# Patient Record
Sex: FEMALE | Race: WHITE | HISPANIC OR LATINO | Employment: OTHER | URBAN - METROPOLITAN AREA
[De-identification: names, ages, dates, MRNs, and addresses within clinical notes are randomized per-mention and may not be internally consistent; named-entity substitution may affect disease eponyms.]

---

## 2019-04-21 ENCOUNTER — COMMUNICATION - HEALTHEAST (OUTPATIENT)
Dept: SCHEDULING | Facility: CLINIC | Age: 59
End: 2019-04-21

## 2021-05-28 NOTE — TELEPHONE ENCOUNTER
Triage Nurse Advisor- [facility]    RN Phone Assessment  Pt calling requesting care advice for diabetes  Pt missed her dose of insulin last night, long acting insulin  Lantus, normal dose is 17 units at bedtime. Missed dose  Today at 5:30 pm took 6 units because she remembered she had not taken last night  Wants to know if she can take her normal dose tonight.  PCP is with [facility].     Reviewed Care Advice per Protocol  Encouraged pt to call her PCP. Pt  agrees with  the recommended plan of care. Has no further questions at this time. Encouraged to call back as needed for worsening or development of additional symptoms as reviewed per protocol.     Radha Hernandez, RN, [facility] Nurse Triage    Reason for Disposition    Diabetes drug error or overdose (e.g., insulin or extra dose)    Protocols used: MEDICATION QUESTION CALL-A-

## 2022-02-07 ENCOUNTER — APPOINTMENT (OUTPATIENT)
Dept: RADIOLOGY | Facility: HOSPITAL | Age: 62
End: 2022-02-07
Attending: EMERGENCY MEDICINE
Payer: COMMERCIAL

## 2022-02-07 ENCOUNTER — HOSPITAL ENCOUNTER (EMERGENCY)
Facility: HOSPITAL | Age: 62
Discharge: HOME | End: 2022-02-07
Attending: EMERGENCY MEDICINE | Admitting: EMERGENCY MEDICINE
Payer: COMMERCIAL

## 2022-02-07 VITALS
BODY MASS INDEX: 29.08 KG/M2 | HEIGHT: 62 IN | HEART RATE: 74 BPM | DIASTOLIC BLOOD PRESSURE: 78 MMHG | RESPIRATION RATE: 18 BRPM | SYSTOLIC BLOOD PRESSURE: 160 MMHG | OXYGEN SATURATION: 100 % | TEMPERATURE: 98 F | WEIGHT: 158 LBS

## 2022-02-07 DIAGNOSIS — N39.0 URINARY TRACT INFECTION WITHOUT HEMATURIA, SITE UNSPECIFIED: ICD-10-CM

## 2022-02-07 DIAGNOSIS — R79.89 ELEVATED SERUM CREATININE: ICD-10-CM

## 2022-02-07 DIAGNOSIS — U07.1 2019 NOVEL CORONAVIRUS DISEASE (COVID-19): ICD-10-CM

## 2022-02-07 DIAGNOSIS — N18.9 CHRONIC KIDNEY DISEASE, UNSPECIFIED CKD STAGE: ICD-10-CM

## 2022-02-07 PROBLEM — S52.502D CLOSED FRACTURE OF LOWER END OF LEFT RADIUS WITH ROUTINE HEALING: Status: ACTIVE | Noted: 2022-02-07

## 2022-02-07 PROBLEM — N18.32 STAGE 3B CHRONIC KIDNEY DISEASE (H): Status: ACTIVE | Noted: 2021-11-08

## 2022-02-07 PROBLEM — H25.13 AGE-RELATED NUCLEAR CATARACT OF BOTH EYES: Status: ACTIVE | Noted: 2020-01-07

## 2022-02-07 PROBLEM — E78.1 HIGH TRIGLYCERIDES: Status: ACTIVE | Noted: 2019-10-08

## 2022-02-07 PROBLEM — B96.20 E. COLI UTI: Status: ACTIVE | Noted: 2021-12-06

## 2022-02-07 PROBLEM — D64.9 ANEMIA: Status: ACTIVE | Noted: 2020-06-15

## 2022-02-07 PROBLEM — R80.9 MICROALBUMINURIA: Status: ACTIVE | Noted: 2019-10-08

## 2022-02-07 PROBLEM — R07.9 CHEST PAIN: Status: ACTIVE | Noted: 2020-06-08

## 2022-02-07 PROBLEM — I10 HTN (HYPERTENSION): Status: ACTIVE | Noted: 2021-01-29

## 2022-02-07 LAB
ALBUMIN SERPL-MCNC: 3.5 G/DL (ref 3.5–5)
ALBUMIN UR-MCNC: 100 MG/DL
ALP SERPL-CCNC: 61 U/L (ref 45–120)
ALT SERPL W P-5'-P-CCNC: 20 U/L (ref 0–45)
ANION GAP SERPL CALCULATED.3IONS-SCNC: 11 MMOL/L (ref 5–18)
ANION GAP SERPL CALCULATED.3IONS-SCNC: 7 MMOL/L (ref 5–18)
APPEARANCE UR: ABNORMAL
AST SERPL W P-5'-P-CCNC: 20 U/L (ref 0–40)
BACTERIA #/AREA URNS HPF: ABNORMAL /HPF
BASOPHILS # BLD AUTO: 0 10E3/UL (ref 0–0.2)
BASOPHILS NFR BLD AUTO: 1 %
BILIRUB DIRECT SERPL-MCNC: 0.1 MG/DL
BILIRUB SERPL-MCNC: 0.2 MG/DL (ref 0–1)
BILIRUB UR QL STRIP: NEGATIVE
BUN SERPL-MCNC: 20 MG/DL (ref 8–22)
BUN SERPL-MCNC: 24 MG/DL (ref 8–22)
C REACTIVE PROTEIN LHE: 0.5 MG/DL (ref 0–0.8)
CALCIUM SERPL-MCNC: 8 MG/DL (ref 8.5–10.5)
CALCIUM SERPL-MCNC: 8.4 MG/DL (ref 8.5–10.5)
CHLORIDE BLD-SCNC: 101 MMOL/L (ref 98–107)
CHLORIDE BLD-SCNC: 96 MMOL/L (ref 98–107)
CO2 SERPL-SCNC: 23 MMOL/L (ref 22–31)
CO2 SERPL-SCNC: 25 MMOL/L (ref 22–31)
COLOR UR AUTO: ABNORMAL
CREAT SERPL-MCNC: 1.95 MG/DL (ref 0.6–1.1)
CREAT SERPL-MCNC: 2.23 MG/DL (ref 0.6–1.1)
EOSINOPHIL # BLD AUTO: 0.1 10E3/UL (ref 0–0.7)
EOSINOPHIL NFR BLD AUTO: 3 %
ERYTHROCYTE [DISTWIDTH] IN BLOOD BY AUTOMATED COUNT: 12.8 % (ref 10–15)
FLUAV RNA SPEC QL NAA+PROBE: NEGATIVE
FLUBV RNA RESP QL NAA+PROBE: NEGATIVE
GFR SERPL CREATININE-BSD FRML MDRD: 24 ML/MIN/1.73M2
GFR SERPL CREATININE-BSD FRML MDRD: 29 ML/MIN/1.73M2
GLUCOSE BLD-MCNC: 159 MG/DL (ref 70–125)
GLUCOSE BLD-MCNC: 181 MG/DL (ref 70–125)
GLUCOSE UR STRIP-MCNC: NEGATIVE MG/DL
HCT VFR BLD AUTO: 37.6 % (ref 35–47)
HGB BLD-MCNC: 12.8 G/DL (ref 11.7–15.7)
HGB UR QL STRIP: ABNORMAL
HOLD SPECIMEN: NORMAL
IMM GRANULOCYTES # BLD: 0 10E3/UL
IMM GRANULOCYTES NFR BLD: 0 %
KETONES UR STRIP-MCNC: NEGATIVE MG/DL
LEUKOCYTE ESTERASE UR QL STRIP: ABNORMAL
LIPASE SERPL-CCNC: 29 U/L (ref 0–52)
LYMPHOCYTES # BLD AUTO: 1.4 10E3/UL (ref 0.8–5.3)
LYMPHOCYTES NFR BLD AUTO: 39 %
MAGNESIUM SERPL-MCNC: 1.7 MG/DL (ref 1.8–2.6)
MCH RBC QN AUTO: 31.4 PG (ref 26.5–33)
MCHC RBC AUTO-ENTMCNC: 34 G/DL (ref 31.5–36.5)
MCV RBC AUTO: 92 FL (ref 78–100)
MONOCYTES # BLD AUTO: 0.3 10E3/UL (ref 0–1.3)
MONOCYTES NFR BLD AUTO: 8 %
NEUTROPHILS # BLD AUTO: 1.8 10E3/UL (ref 1.6–8.3)
NEUTROPHILS NFR BLD AUTO: 49 %
NITRATE UR QL: NEGATIVE
NRBC # BLD AUTO: 0 10E3/UL
NRBC BLD AUTO-RTO: 0 /100
PH UR STRIP: 6 [PH] (ref 5–7)
PLATELET # BLD AUTO: 153 10E3/UL (ref 150–450)
POTASSIUM BLD-SCNC: 3.3 MMOL/L (ref 3.5–5)
POTASSIUM BLD-SCNC: 3.6 MMOL/L (ref 3.5–5)
PROCALCITONIN SERPL-MCNC: 0.06 NG/ML (ref 0–0.49)
PROT SERPL-MCNC: 6.6 G/DL (ref 6–8)
RBC # BLD AUTO: 4.07 10E6/UL (ref 3.8–5.2)
RBC URINE: 5 /HPF
SARS-COV-2 RNA RESP QL NAA+PROBE: POSITIVE
SODIUM SERPL-SCNC: 130 MMOL/L (ref 136–145)
SODIUM SERPL-SCNC: 133 MMOL/L (ref 136–145)
SP GR UR STRIP: 1.01 (ref 1–1.03)
SQUAMOUS EPITHELIAL: 3 /HPF
TSH SERPL DL<=0.005 MIU/L-ACNC: 1.27 UIU/ML (ref 0.3–5)
UROBILINOGEN UR STRIP-MCNC: <2 MG/DL
WBC # BLD AUTO: 3.6 10E3/UL (ref 4–11)
WBC CLUMPS #/AREA URNS HPF: PRESENT /HPF
WBC URINE: >182 /HPF

## 2022-02-07 PROCEDURE — 87636 SARSCOV2 & INF A&B AMP PRB: CPT | Performed by: EMERGENCY MEDICINE

## 2022-02-07 PROCEDURE — 87086 URINE CULTURE/COLONY COUNT: CPT | Performed by: EMERGENCY MEDICINE

## 2022-02-07 PROCEDURE — 83690 ASSAY OF LIPASE: CPT | Performed by: EMERGENCY MEDICINE

## 2022-02-07 PROCEDURE — 82310 ASSAY OF CALCIUM: CPT | Performed by: EMERGENCY MEDICINE

## 2022-02-07 PROCEDURE — 86140 C-REACTIVE PROTEIN: CPT | Performed by: EMERGENCY MEDICINE

## 2022-02-07 PROCEDURE — 83735 ASSAY OF MAGNESIUM: CPT | Performed by: EMERGENCY MEDICINE

## 2022-02-07 PROCEDURE — 250N000011 HC RX IP 250 OP 636: Performed by: EMERGENCY MEDICINE

## 2022-02-07 PROCEDURE — 93005 ELECTROCARDIOGRAM TRACING: CPT | Performed by: EMERGENCY MEDICINE

## 2022-02-07 PROCEDURE — C9803 HOPD COVID-19 SPEC COLLECT: HCPCS

## 2022-02-07 PROCEDURE — 82248 BILIRUBIN DIRECT: CPT | Performed by: EMERGENCY MEDICINE

## 2022-02-07 PROCEDURE — 96365 THER/PROPH/DIAG IV INF INIT: CPT

## 2022-02-07 PROCEDURE — 99285 EMERGENCY DEPT VISIT HI MDM: CPT | Mod: 25

## 2022-02-07 PROCEDURE — 81001 URINALYSIS AUTO W/SCOPE: CPT | Performed by: EMERGENCY MEDICINE

## 2022-02-07 PROCEDURE — 36415 COLL VENOUS BLD VENIPUNCTURE: CPT | Performed by: STUDENT IN AN ORGANIZED HEALTH CARE EDUCATION/TRAINING PROGRAM

## 2022-02-07 PROCEDURE — 84443 ASSAY THYROID STIM HORMONE: CPT | Performed by: EMERGENCY MEDICINE

## 2022-02-07 PROCEDURE — 258N000003 HC RX IP 258 OP 636: Performed by: EMERGENCY MEDICINE

## 2022-02-07 PROCEDURE — 85025 COMPLETE CBC W/AUTO DIFF WBC: CPT | Performed by: EMERGENCY MEDICINE

## 2022-02-07 PROCEDURE — 36415 COLL VENOUS BLD VENIPUNCTURE: CPT | Performed by: EMERGENCY MEDICINE

## 2022-02-07 PROCEDURE — 71045 X-RAY EXAM CHEST 1 VIEW: CPT

## 2022-02-07 PROCEDURE — 84145 PROCALCITONIN (PCT): CPT | Performed by: EMERGENCY MEDICINE

## 2022-02-07 PROCEDURE — 96361 HYDRATE IV INFUSION ADD-ON: CPT

## 2022-02-07 RX ORDER — ONDANSETRON 4 MG/1
4 TABLET, ORALLY DISINTEGRATING ORAL EVERY 8 HOURS PRN
Qty: 20 TABLET | Refills: 0 | Status: SHIPPED | OUTPATIENT
Start: 2022-02-07 | End: 2023-04-14

## 2022-02-07 RX ORDER — CEFDINIR 300 MG/1
300 CAPSULE ORAL DAILY
Qty: 10 CAPSULE | Refills: 0 | Status: SHIPPED | OUTPATIENT
Start: 2022-02-07 | End: 2022-02-17

## 2022-02-07 RX ORDER — CEFTRIAXONE 2 G/1
2 INJECTION, POWDER, FOR SOLUTION INTRAMUSCULAR; INTRAVENOUS ONCE
Status: COMPLETED | OUTPATIENT
Start: 2022-02-07 | End: 2022-02-07

## 2022-02-07 RX ORDER — ONDANSETRON 2 MG/ML
4 INJECTION INTRAMUSCULAR; INTRAVENOUS ONCE
Status: DISCONTINUED | OUTPATIENT
Start: 2022-02-07 | End: 2022-02-07 | Stop reason: HOSPADM

## 2022-02-07 RX ADMIN — SODIUM CHLORIDE 500 ML: 9 INJECTION, SOLUTION INTRAVENOUS at 10:42

## 2022-02-07 RX ADMIN — CEFTRIAXONE SODIUM 2 G: 2 INJECTION, POWDER, FOR SOLUTION INTRAMUSCULAR; INTRAVENOUS at 10:42

## 2022-02-07 RX ADMIN — SODIUM CHLORIDE, POTASSIUM CHLORIDE, SODIUM LACTATE AND CALCIUM CHLORIDE 500 ML: 600; 310; 30; 20 INJECTION, SOLUTION INTRAVENOUS at 08:52

## 2022-02-07 ASSESSMENT — ENCOUNTER SYMPTOMS
RHINORRHEA: 0
CHILLS: 0
DIARRHEA: 0
NAUSEA: 1
FATIGUE: 1
FEVER: 0
COUGH: 0
DYSURIA: 1
SHORTNESS OF BREATH: 1
DIAPHORESIS: 0

## 2022-02-07 ASSESSMENT — MIFFLIN-ST. JEOR: SCORE: 1234.93

## 2022-02-07 NOTE — ED PROVIDER NOTES
EMERGENCY DEPARTMENT ENCOUNTER      NAME: Ana Bunn  AGE: 61 year old female  YOB: 1960  MRN: [id number]  EVALUATION DATE & TIME: 2/7/2022  8:23 AM    PCP: No primary care provider on file.    ED PROVIDER: Leonardo Parker M.D.      Chief Complaint   Patient presents with     Shortness of Breath         IMPRESSION  1. 2019 novel coronavirus disease (COVID-19)    2. Urinary tract infection without hematuria, site unspecified    3. Elevated serum creatinine    4. Chronic kidney disease, unspecified CKD stage        PLAN  - cefdinir 300mg q24h x10 days for UTI  - home isolation for COVID-19 with [facility] referral made  - close PCP follow up  - discharge to home    ED COURSE & MEDICAL DECISION MAKING    ED Course as of 02/07/22 1307   Mon Feb 07, 2022   0824 61yoF with history of HTN, T2DM (takes insulin), s/p COVID-19 vaccine (most recent 5/2021) presenting for evaluation of fatigue & shortness of breath. Reports 3 days ago she developed new fatigue with mild nausea; then 2 nights ago had one episode of dysuria which reminded her of prior UTI. Had previously-prescribed Bactrim so started it; dysuria resolved. Fatigue & nausea ongoing though (no vomiting) and this morning had shortness of breath when trying to walk around as well. No chest pain/pressure. No cough, runny nose, nasal congestion, body aches, fevers, sweats, chills, diarrhea, pains of any sort. Took no meds other than Bactrim prior to ED arrival.    Normal vitals on presentation. Calm on exam with clear lungs, normal work of breathing, no stridor, normal phonation, benign abdomen, no peripheral edema or calf tenderness, moist mucous membranes, nonfocal neuro exam. Overall nonspecific broad symptoms; will obtain COVID swab, blood, urine, CXR while giving 500mL IVF & Zofran for symptoms. Triage EKG with no ST changes; doubt ACS or primary cardiac etiology. Patient comfortable with this plan; no further questions at this time.   0941  CXR with no edema, infiltrate, pneumothorax, acute abnormality, explanatory pathology. Labs to this point notable for VIKTOR with creatinine 2.2 (baseline 1.6 per [facility]) with K 3.3 and mag 1.7; will not aggressively treat mildly low K & Mg given active VIKTOR and no EKG changes of hypoK or hypoMg.   0948 Lab called with positive COVID-19 swab result.   1020 UA with clear UTI as well with bacteria, WBC clumps, leuk esterase; Rocephin thus ordered.   1113 Procal within normal limits; reassuring against bacteremia.   1144 Patient ambulated without difficulty with O2 sats % on room air during this. Repeat BMP after IVF pending at this time.   1235 Recheck BMP with creatinine 1.9 with K 3.9; improved from initial. Overall has UTI with mild increased creatinine on top of prior CKD as well as COVID. No concern for sepsis from UTI standpoint. Satting well on room air from COVID standpoint and ambulates without difficulty. Ok for outpatient management on both fronts. Home cares and isolation discussed and understood at bedside. Patient able to tolerate PO and walk without difficulty. Patient comfortable with discharge at this time. Return precautions and need for PCP follow up discussed and understood. No further questions at the time of discharge.       8:30 AM I met with the patient for the initial interview and physical examination. Discussed plan for treatment and workup in the ED.  9:48 AM Patient COVID positive.  12:27 PM rechecked the patient. Patient is feeling comfortable, ready to go home. We discussed discharge plan, no further questions at this time.      This patient involved a high degree of complexity in medical decision making, as significant risks were present and assessed.    I wore the following PPE during this patient encounter:  N95 mask, face shield w/ eye protection, gloves, gown    MEDICATIONS GIVEN IN THE EMERGENCY DEPARTMENT  Medications   ondansetron (ZOFRAN) injection 4 mg (0 mg  Intravenous Hold 2/7/22 0919)   lactated ringers BOLUS 500 mL (0 mLs Intravenous Stopped 2/7/22 1036)   cefTRIAXone (ROCEPHIN) 2 g vial to attach to  ml bag for ADULTS or NS 50 ml bag for PEDS (0 g Intravenous Stopped 2/7/22 1117)   0.9% sodium chloride BOLUS (0 mLs Intravenous Stopped 2/7/22 1128)       NEW PRESCRIPTIONS STARTED AT TODAY'S ER VISIT  Current Discharge Medication List      START taking these medications    Details   cefdinir (OMNICEF) 300 MG capsule Take 1 capsule (300 mg) by mouth daily for 10 days  Qty: 10 capsule, Refills: 0      ondansetron (ZOFRAN ODT) 4 MG ODT tab Take 1 tablet (4 mg) by mouth every 8 hours as needed for nausea  Qty: 20 tablet, Refills: 0                 =================================================================      HPI  Patient information was obtained from: patient    Use of : N/A      Ana Bunn is a 61 year old female with a pertinent history of hypertension, CKD stage 3, diabetes mellitus type 2, UTI, s/p COVID-19 vaccine (most recent 5/2021), chest pain, and anemia who presents to this ED via private car for evaluation of shortness of breath.    Patient presenting for evaluation of fatigue & shortness of breath. Reports 3 days ago (2/4) she developed new fatigue with mild nausea; then 2 nights ago had one episode of dysuria which reminded her of prior UTI. Had previously-prescribed Bactrim so started it; dysuria resolved. Fatigue & nausea ongoing though (no vomiting) and this morning had shortness of breath when trying to walk around as well. No chest pain/pressure. No cough, runny nose, nasal congestion, body aches, fevers, sweats, chills, diarrhea, pains of any sort. Took no meds other than Bactrim prior to ED arrival. Denies any other complaints at this time.    REVIEW OF SYSTEMS   Review of Systems   Constitutional: Positive for fatigue. Negative for chills, diaphoresis and fever.        Negative for body aches   HENT: Negative for  congestion and rhinorrhea.    Respiratory: Positive for shortness of breath. Negative for cough.    Cardiovascular: Negative for chest pain.   Gastrointestinal: Positive for nausea. Negative for diarrhea.   Genitourinary: Positive for dysuria (resolved).   All other systems reviewed and are negative.    All other systems reviewed and are negative except as noted above in HPI.        --------------- MEDICAL HISTORY ---------------  PAST MEDICAL HISTORY:  History reviewed. No pertinent past medical history.  Patient Active Problem List   Diagnosis     Age-related nuclear cataract of both eyes     Chest pain     Anemia     Closed fracture of lower end of left radius with routine healing     E. coli UTI     High triglycerides     HTN (hypertension)     Microalbuminuria     Stage 3b chronic kidney disease (H)     Type 2 diabetes mellitus with microalbuminuria, with long-term current use of insulin (H)     Vitamin D insufficiency       PAST SURGICAL HISTORY:  History reviewed. No pertinent surgical history.    CURRENT MEDICATIONS:      Current Facility-Administered Medications:      ondansetron (ZOFRAN) injection 4 mg, 4 mg, Intravenous, Once, Leonardo Parker MD    Current Outpatient Medications:      cefdinir (OMNICEF) 300 MG capsule, Take 1 capsule (300 mg) by mouth daily for 10 days, Disp: 10 capsule, Rfl: 0     ondansetron (ZOFRAN ODT) 4 MG ODT tab, Take 1 tablet (4 mg) by mouth every 8 hours as needed for nausea, Disp: 20 tablet, Rfl: 0    ALLERGIES:  No Known Allergies    FAMILY HISTORY:  Reviewed. No pertinent past family history.    SOCIAL HISTORY:   Social History     Socioeconomic History     Marital status:      Spouse name: Not on file     Number of children: Not on file     Years of education: Not on file     Highest education level: Not on file   Occupational History     Not on file   Tobacco Use     Smoking status: Not on file     Smokeless tobacco: Not on file   Substance and Sexual Activity      Alcohol use: Not on file     Drug use: Not on file     Sexual activity: Not on file   Other Topics Concern     Not on file   Social History Narrative     Not on file     Social Determinants of Health     Financial Resource Strain: Not on file   Food Insecurity: Not on file   Transportation Needs: Not on file   Physical Activity: Not on file   Stress: Not on file   Social Connections: Not on file   Intimate Partner Violence: Not on file   Housing Stability: Not on file         --------------- PHYSICAL EXAM ---------------  Nursing notes and vitals reviewed by me.  VITALS:  Vitals:    02/07/22 0900 02/07/22 1136 02/07/22 1229 02/07/22 1230   BP: (!) 160/78      Pulse: 79 89 77 74   Resp: 13 28 19 18   Temp:       SpO2: 97% 99% 100% 100%   Weight:       Height:           PHYSICAL EXAM:    General:  alert, interactive, no distress  Eyes:  conjunctivae clear, conjugate gaze   HENT:  atraumatic, nose with no rhinorrhea, oropharynx clear, moist mucous membranes  Neck:  no meningismus, no stridor, normal phonation  Cardiovascular:  HR 80s during exam, regular rhythm, no murmurs, brisk cap refill  Chest:  no chest wall tenderness  Pulmonary:  normal work of breathing, clear lungs bilaterally  Abdomen:  soft, nondistended, nontender  :  no CVA tenderness  Back:  no midline spinal tenderness  Musculoskeletal:  no pretibial edema, no calf tenderness. Gross ROM intact to joints of extremities with no obvious deformities.  Skin:  warm, dry, no rash  Neuro:  awake, alert, answers questions appropriately, follows commands, moves all limbs, CN 2-12 intact, negative pronator drift, 5/5 strength to all extremities, no tremor  Psych:  calm, normal affect      --------------- RESULTS ---------------  EKG:    Reviewed and interpreted by me.  NSR at 90bpm, no ST changes, small symmetric TWI in V1-2, , QRS 94 with incomplete RBBB pattern, QTc 469  No priors  My read.    LAB:  Reviewed and interpreted by me.  Results for orders  placed or performed during the hospital encounter of 02/07/22   XR Chest Port 1 View    Impression    IMPRESSION: Bilateral cervical ribs. Slight thoracolumbar scoliosis. Heart size appears normal. Lungs appear clear.   Extra Blue Top Tube   Result Value Ref Range    Hold Specimen JIC    Extra Red Top Tube   Result Value Ref Range    Hold Specimen JIC    Extra Green Top (Lithium Heparin) Tube   Result Value Ref Range    Hold Specimen JIC    Extra Purple Top Tube   Result Value Ref Range    Hold Specimen JIC    Symptomatic; Unknown Influenza A/B & SARS-CoV2 (COVID-19) Virus PCR Multiplex Nasopharyngeal    Specimen: Nasopharyngeal; Swab   Result Value Ref Range    Influenza A PCR Negative Negative    Influenza B PCR Negative Negative    SARS CoV2 PCR Positive (A) Negative   UA with Microscopic reflex to Culture    Specimen: Urine, Clean Catch   Result Value Ref Range    Color Urine Light Yellow Colorless, Straw, Light Yellow, Yellow    Appearance Urine Turbid (A) Clear    Glucose Urine Negative Negative mg/dL    Bilirubin Urine Negative Negative    Ketones Urine Negative Negative mg/dL    Specific Gravity Urine 1.009 1.001 - 1.030    Blood Urine 0.03 mg/dL (A) Negative    pH Urine 6.0 5.0 - 7.0    Protein Albumin Urine 100  (A) Negative mg/dL    Urobilinogen Urine <2.0 <2.0 mg/dL    Nitrite Urine Negative Negative    Leukocyte Esterase Urine 500 Ania/uL (A) Negative    Bacteria Urine Moderate (A) None Seen /HPF    WBC Clumps Urine Present (A) None Seen /HPF    RBC Urine 5 (H) <=2 /HPF    WBC Urine >182 (H) <=5 /HPF    Squamous Epithelials Urine 3 (H) <=1 /HPF   CRP inflammation   Result Value Ref Range    CRP 0.5 0.0-<0.8 mg/dL   Result Value Ref Range    Procalcitonin 0.06 0.00 - 0.49 ng/mL   Result Value Ref Range    Magnesium 1.7 (L) 1.8 - 2.6 mg/dL   TSH with free T4 reflex   Result Value Ref Range    TSH 1.27 0.30 - 5.00 uIU/mL   Basic metabolic panel   Result Value Ref Range    Sodium 130 (L) 136 - 145 mmol/L     Potassium 3.3 (L) 3.5 - 5.0 mmol/L    Chloride 96 (L) 98 - 107 mmol/L    Carbon Dioxide (CO2) 23 22 - 31 mmol/L    Anion Gap 11 5 - 18 mmol/L    Urea Nitrogen 24 (H) 8 - 22 mg/dL    Creatinine 2.23 (H) 0.60 - 1.10 mg/dL    Calcium 8.4 (L) 8.5 - 10.5 mg/dL    Glucose 181 (H) 70 - 125 mg/dL    GFR Estimate 24 (L) >60 mL/min/1.73m2   Hepatic function panel   Result Value Ref Range    Bilirubin Total 0.2 0.0 - 1.0 mg/dL    Bilirubin Direct 0.1 <=0.5 mg/dL    Protein Total 6.6 6.0 - 8.0 g/dL    Albumin 3.5 3.5 - 5.0 g/dL    Alkaline Phosphatase 61 45 - 120 U/L    AST 20 0 - 40 U/L    ALT 20 0 - 45 U/L   Result Value Ref Range    Lipase 29 0 - 52 U/L   CBC with platelets and differential   Result Value Ref Range    WBC Count 3.6 (L) 4.0 - 11.0 10e3/uL    RBC Count 4.07 3.80 - 5.20 10e6/uL    Hemoglobin 12.8 11.7 - 15.7 g/dL    Hematocrit 37.6 35.0 - 47.0 %    MCV 92 78 - 100 fL    MCH 31.4 26.5 - 33.0 pg    MCHC 34.0 31.5 - 36.5 g/dL    RDW 12.8 10.0 - 15.0 %    Platelet Count 153 150 - 450 10e3/uL    % Neutrophils 49 %    % Lymphocytes 39 %    % Monocytes 8 %    % Eosinophils 3 %    % Basophils 1 %    % Immature Granulocytes 0 %    NRBCs per 100 WBC 0 <1 /100    Absolute Neutrophils 1.8 1.6 - 8.3 10e3/uL    Absolute Lymphocytes 1.4 0.8 - 5.3 10e3/uL    Absolute Monocytes 0.3 0.0 - 1.3 10e3/uL    Absolute Eosinophils 0.1 0.0 - 0.7 10e3/uL    Absolute Basophils 0.0 0.0 - 0.2 10e3/uL    Absolute Immature Granulocytes 0.0 <=0.4 10e3/uL    Absolute NRBCs 0.0 10e3/uL   Basic metabolic panel   Result Value Ref Range    Sodium 133 (L) 136 - 145 mmol/L    Potassium 3.6 3.5 - 5.0 mmol/L    Chloride 101 98 - 107 mmol/L    Carbon Dioxide (CO2) 25 22 - 31 mmol/L    Anion Gap 7 5 - 18 mmol/L    Urea Nitrogen 20 8 - 22 mg/dL    Creatinine 1.95 (H) 0.60 - 1.10 mg/dL    Calcium 8.0 (L) 8.5 - 10.5 mg/dL    Glucose 159 (H) 70 - 125 mg/dL    GFR Estimate 29 (L) >60 mL/min/1.73m2       RADIOLOGY:  Reviewed by me. Please see official  radiology report.  Recent Results (from the past 24 hour(s))   XR Chest Port 1 View    Narrative    EXAM: XR CHEST PORT 1 VIEW  LOCATION: [facility]  DATE/TIME: 2/7/2022 8:41 AM    INDICATION: SOB  COMPARISON: 12/06/2021      Impression    IMPRESSION: Bilateral cervical ribs. Slight thoracolumbar scoliosis. Heart size appears normal. Lungs appear clear.       PROCEDURES:   Procedures   --------------------------------------------------------------------------------   Cardiac telemetry monitoring ordered, reviewed, and interpreted by me while patient was in the Emergency Department. Revealed no acute abnormalities.  --------------------------------------------------------------------------------             I, Janelle Bazzi, am serving as a scribe to document services personally performed by Dr. Leonardo Parker based on my observation and the provider's statements to me. I, Leonardo Parker MD attest that Janelle Bazzi is acting in a scribe capacity, has observed my performance of the services and has documented them in accordance with my direction.      Leonardo Parker MD  02/07/22  Emergency Medicine  [facility] EMERGENCY DEPARTMENT  [address] [place] [id number] [phone]  Dept: [phone]       Leonardo Parker MD  02/07/22 0808

## 2022-02-07 NOTE — ED TRIAGE NOTES
Patient recently started on antibiotic for UTI (Bactrim) yesterday. Also developed SOB, yesterday, has been vaccinated (not booster yet) for covid. No pain. No known CAD.smoker. no cough

## 2022-02-07 NOTE — DISCHARGE INSTRUCTIONS
Isolate at home given your positive COVID-19 test and ongoing symptoms.    Take the antibiotic (cefdinir) as prescribed for your urine infection as well.    Use the prescribed Zofran as needed for any nausea.    For pain or fever at home, take:  - over-the-counter ibuprofen 400mg by mouth every 8 hours (max dose 2400mg in 24 hours)  - over-the-counter acetaminophen 1g by mouth every 6 hours (max dose 4g in 24 hours)    To decrease cough, you can try:  - honey (either spoonful or mixed in tea)  - over-the-counter Robitussin-DM cough syrup    For nasal congestion, take the following over-the-counter medications:  - cetirizine (Zyrtec) 10mg by mouth one time daily as needed  - pseudoephedrine (Sudafed) 60mg by mouth every 4-6 hours as needed    Continue all of your previously-prescribed medications as well.    Drink plenty of fluids to stay hydrated.    To see if you qualify for monoclonal antibody treatment for COVID-19 (allocated through the [place] Department of Health), go to the website:  [email or web address]    Follow up with your Primary Care provider in 2 days for a recheck.    Return to the Emergency Department for any difficulty breathing, persistent vomiting, or any other concerns.

## 2022-02-08 LAB
ATRIAL RATE - MUSE: 90 BPM
BACTERIA UR CULT: NORMAL
DIASTOLIC BLOOD PRESSURE - MUSE: NORMAL MMHG
INTERPRETATION ECG - MUSE: NORMAL
P AXIS - MUSE: 59 DEGREES
PR INTERVAL - MUSE: 150 MS
QRS DURATION - MUSE: 94 MS
QT - MUSE: 384 MS
QTC - MUSE: 469 MS
R AXIS - MUSE: -38 DEGREES
SYSTOLIC BLOOD PRESSURE - MUSE: NORMAL MMHG
T AXIS - MUSE: 56 DEGREES
VENTRICULAR RATE- MUSE: 90 BPM

## 2022-04-19 ENCOUNTER — APPOINTMENT (OUTPATIENT)
Dept: RADIOLOGY | Facility: HOSPITAL | Age: 62
End: 2022-04-19
Attending: EMERGENCY MEDICINE
Payer: COMMERCIAL

## 2022-04-19 ENCOUNTER — TELEPHONE (OUTPATIENT)
Dept: NEUROSURGERY | Facility: CLINIC | Age: 62
End: 2022-04-19

## 2022-04-19 ENCOUNTER — APPOINTMENT (OUTPATIENT)
Dept: CT IMAGING | Facility: HOSPITAL | Age: 62
End: 2022-04-19
Attending: EMERGENCY MEDICINE
Payer: COMMERCIAL

## 2022-04-19 ENCOUNTER — HOSPITAL ENCOUNTER (EMERGENCY)
Facility: HOSPITAL | Age: 62
Discharge: HOME | End: 2022-04-19
Attending: EMERGENCY MEDICINE | Admitting: EMERGENCY MEDICINE
Payer: COMMERCIAL

## 2022-04-19 VITALS
WEIGHT: 160 LBS | OXYGEN SATURATION: 97 % | HEIGHT: 62 IN | BODY MASS INDEX: 29.44 KG/M2 | HEART RATE: 89 BPM | RESPIRATION RATE: 16 BRPM | TEMPERATURE: 98.1 F | DIASTOLIC BLOOD PRESSURE: 97 MMHG | SYSTOLIC BLOOD PRESSURE: 170 MMHG

## 2022-04-19 DIAGNOSIS — S00.83XA CONTUSION OF FACE, SCALP AND NECK, INITIAL ENCOUNTER: ICD-10-CM

## 2022-04-19 DIAGNOSIS — S00.03XA CONTUSION OF FACE, SCALP AND NECK, INITIAL ENCOUNTER: ICD-10-CM

## 2022-04-19 DIAGNOSIS — S06.5XAA SUBDURAL HEMATOMA (H): ICD-10-CM

## 2022-04-19 DIAGNOSIS — Z86.39 HISTORY OF DIABETES MELLITUS: ICD-10-CM

## 2022-04-19 DIAGNOSIS — M47.812 CERVICAL SPONDYLOSIS: ICD-10-CM

## 2022-04-19 DIAGNOSIS — S06.5XAA SDH (SUBDURAL HEMATOMA) (H): Primary | ICD-10-CM

## 2022-04-19 DIAGNOSIS — S52.122A CLOSED DISPLACED FRACTURE OF HEAD OF LEFT RADIUS, INITIAL ENCOUNTER: ICD-10-CM

## 2022-04-19 DIAGNOSIS — I10 HYPERTENSION, UNSPECIFIED TYPE: ICD-10-CM

## 2022-04-19 DIAGNOSIS — S10.93XA CONTUSION OF FACE, SCALP AND NECK, INITIAL ENCOUNTER: ICD-10-CM

## 2022-04-19 DIAGNOSIS — M54.16 LUMBAR RADICULOPATHY: ICD-10-CM
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
## 2022-04-19 LAB
ANION GAP SERPL CALCULATED.3IONS-SCNC: 11 MMOL/L (ref 5–18)
APTT PPP: 32 SECONDS (ref 22–38)
BASOPHILS # BLD AUTO: 0 10E3/UL (ref 0–0.2)
BASOPHILS NFR BLD AUTO: 0 %
BUN SERPL-MCNC: 17 MG/DL (ref 8–22)
CALCIUM SERPL-MCNC: 9.4 MG/DL (ref 8.5–10.5)
CHLORIDE BLD-SCNC: 99 MMOL/L (ref 98–107)
CO2 SERPL-SCNC: 22 MMOL/L (ref 22–31)
CREAT SERPL-MCNC: 1.48 MG/DL (ref 0.6–1.1)
EOSINOPHIL # BLD AUTO: 0.1 10E3/UL (ref 0–0.7)
EOSINOPHIL NFR BLD AUTO: 1 %
ERYTHROCYTE [DISTWIDTH] IN BLOOD BY AUTOMATED COUNT: 12.8 % (ref 10–15)
GFR SERPL CREATININE-BSD FRML MDRD: 40 ML/MIN/1.73M2
GLUCOSE BLD-MCNC: 145 MG/DL (ref 70–125)
HCT VFR BLD AUTO: 36.9 % (ref 35–47)
HGB BLD-MCNC: 12.4 G/DL (ref 11.7–15.7)
IMM GRANULOCYTES # BLD: 0 10E3/UL
IMM GRANULOCYTES NFR BLD: 0 %
INR PPP: 0.97 (ref 0.9–1.15)
LYMPHOCYTES # BLD AUTO: 2.4 10E3/UL (ref 0.8–5.3)
LYMPHOCYTES NFR BLD AUTO: 31 %
MCH RBC QN AUTO: 31.1 PG (ref 26.5–33)
MCHC RBC AUTO-ENTMCNC: 33.6 G/DL (ref 31.5–36.5)
MCV RBC AUTO: 93 FL (ref 78–100)
MONOCYTES # BLD AUTO: 0.5 10E3/UL (ref 0–1.3)
MONOCYTES NFR BLD AUTO: 6 %
NEUTROPHILS # BLD AUTO: 4.8 10E3/UL (ref 1.6–8.3)
NEUTROPHILS NFR BLD AUTO: 62 %
NRBC # BLD AUTO: 0 10E3/UL
NRBC BLD AUTO-RTO: 0 /100
PLATELET # BLD AUTO: 222 10E3/UL (ref 150–450)
POTASSIUM BLD-SCNC: 4 MMOL/L (ref 3.5–5)
RBC # BLD AUTO: 3.99 10E6/UL (ref 3.8–5.2)
SODIUM SERPL-SCNC: 132 MMOL/L (ref 136–145)
WBC # BLD AUTO: 7.8 10E3/UL (ref 4–11)

## 2022-04-19 PROCEDURE — 70450 CT HEAD/BRAIN W/O DYE: CPT | Mod: 76

## 2022-04-19 PROCEDURE — 73110 X-RAY EXAM OF WRIST: CPT | Mod: LT

## 2022-04-19 PROCEDURE — 85730 THROMBOPLASTIN TIME PARTIAL: CPT | Performed by: EMERGENCY MEDICINE

## 2022-04-19 PROCEDURE — 73080 X-RAY EXAM OF ELBOW: CPT | Mod: LT

## 2022-04-19 PROCEDURE — 72125 CT NECK SPINE W/O DYE: CPT

## 2022-04-19 PROCEDURE — 24650 CLTX RDL HEAD/NCK FX WO MNPJ: CPT | Mod: LT

## 2022-04-19 PROCEDURE — 36415 COLL VENOUS BLD VENIPUNCTURE: CPT | Performed by: EMERGENCY MEDICINE

## 2022-04-19 PROCEDURE — 250N000013 HC RX MED GY IP 250 OP 250 PS 637: Performed by: EMERGENCY MEDICINE

## 2022-04-19 PROCEDURE — 99285 EMERGENCY DEPT VISIT HI MDM: CPT | Mod: 25

## 2022-04-19 PROCEDURE — 85025 COMPLETE CBC W/AUTO DIFF WBC: CPT | Performed by: EMERGENCY MEDICINE

## 2022-04-19 PROCEDURE — 99221 1ST HOSP IP/OBS SF/LOW 40: CPT | Performed by: PHYSICIAN ASSISTANT

## 2022-04-19 PROCEDURE — 73030 X-RAY EXAM OF SHOULDER: CPT | Mod: LT

## 2022-04-19 PROCEDURE — 70450 CT HEAD/BRAIN W/O DYE: CPT

## 2022-04-19 PROCEDURE — 80048 BASIC METABOLIC PNL TOTAL CA: CPT | Performed by: EMERGENCY MEDICINE

## 2022-04-19 PROCEDURE — 85610 PROTHROMBIN TIME: CPT | Performed by: EMERGENCY MEDICINE

## 2022-04-19 PROCEDURE — 70486 CT MAXILLOFACIAL W/O DYE: CPT

## 2022-04-19 RX ORDER — OXYCODONE HYDROCHLORIDE 5 MG/1
5 TABLET ORAL EVERY 6 HOURS PRN
Qty: 6 TABLET | Refills: 0 | Status: SHIPPED | OUTPATIENT
Start: 2022-04-19 | End: 2022-04-22

## 2022-04-19 RX ORDER — ACETAMINOPHEN 325 MG/1
650 TABLET ORAL ONCE
Status: COMPLETED | OUTPATIENT
Start: 2022-04-19 | End: 2022-04-19

## 2022-04-19 RX ADMIN — ACETAMINOPHEN 650 MG: 325 TABLET ORAL at 11:25

## 2022-04-19 ASSESSMENT — ENCOUNTER SYMPTOMS
LIGHT-HEADEDNESS: 0
EYE REDNESS: 1
NUMBNESS: 0
SHORTNESS OF BREATH: 0
VOMITING: 0
NAUSEA: 0
DIZZINESS: 0
ABDOMINAL PAIN: 0
DIARRHEA: 0

## 2022-04-19 ASSESSMENT — VISUAL ACUITY
OD: 20/25
OS: 20/40

## 2022-04-19 NOTE — CONSULTS
[facility]    Neurosurgery Consultation     Date of Admission:  4/19/2022  Date of Consult (When I saw the patient): 04/19/22    Assessment & Plan   Ana Bunn is a 61 year old female who was admitted on 4/19/2022. I was asked to see the patient for small SDH status post fall.    Active Problems:  SDH   Low back pain   Plan:   Repeat head CT 6 hours from previous if stable okay to discharge and follow up outpatient in 2 weeks with repeat head CT  Also noted degenerative cervical spinal stenosis - will obtain outpatient MRI for further evaluation currently on complaint of cervical tightness denies radicular upper extremity symptoms   Has chronic history of low back and right radicular leg pain that will flare up multiple times per year of which treats with ice or heat and OTC medications- would like to further evaluate and will plan to obtain MRI for follow up      I have discussed the following assessment and plan with Dr. Hartley who is in agreement with initial plan and will follow up with further consultation recommendations.    Bee Baig PA-C  [facility] Neurosurgery  O: [phone]          Code Status    No Order    Reason for Consult   Reason for consult: I was asked by Dr. Barker to evaluate this patient for SDH status post fall     Primary Care Physician   [facility]    Chief Complaint   Fall     History is obtained from the patient    History of Present Illness   Ana Bunn is a 61 year old female who presents with history of DM2, and HTN presented to ED after worsening complaint of all over body pain status post mechanical fall on Saturday where to tripped over a curb. She fell forward and attempted to brace herself with her left arm and resulted in nondisplaced fracture of left radial neck. She ended up hitting the left forehead and has ecchymosis of her left eye. She denies any headaches, nausea, emesis, or visual changes. She  denies any dizziness or gait instability. She denies frequent falls or imbalance. She has no symptoms of myelopathy. She notes chronic low back pain for many years that on occasion will radiate into her right groin and is able to usually manage with OTC medications or heat. She states that her low back pain remains unchanged from her usual pain. She denies any radicular numbness. She denies urinary incontinence. Head CT revealed small high-attenuation focus is seen along the left superior aspect of the anterior falx possible SDH and moderate cervical spondylosis moderate or high-grade foraminal narrowing at C4-5 and C5-6 with more mild canal stenosis at C3-4 and C6-7. She denies any upper extremity radicular pain, numbness, tingling, or weakness.     Past Medical History   I have reviewed this patient's medical history and updated it with pertinent information if needed.   History reviewed. No pertinent past medical history.    Past Surgical History   I have reviewed this patient's surgical history and updated it with pertinent information if needed.  History reviewed. No pertinent surgical history.    Prior to Admission Medications   Prior to Admission Medications   Prescriptions Last Dose Informant Patient Reported? Taking?   ondansetron (ZOFRAN ODT) 4 MG ODT tab   No No   Sig: Take 1 tablet (4 mg) by mouth every 8 hours as needed for nausea      Facility-Administered Medications: None     Allergies   No Known Allergies    Social History   I have reviewed this patient's social history and updated it with pertinent information if needed. Ana Bunn      Family History   I have reviewed this patient's family history and updated it with pertinent information if needed.   History reviewed. No pertinent family history.    Review of Systems   14 point review of systems negative with exception to HPI     Physical Exam   Temp: 98.1  F (36.7  C)   BP: (!) 155/99 Pulse: 84   Resp: 16 SpO2: 100 % O2 Device: None (Room  "air)    Vital Signs with Ranges  Temp:  [98.1  F (36.7  C)] 98.1  F (36.7  C)  Pulse:  [] 84  Resp:  [16-18] 16  BP: (144-155)/() 155/99  SpO2:  [99 %-100 %] 100 %  160 lbs 0 oz     , Blood pressure (!) 155/99, pulse 84, temperature 98.1  F (36.7  C), resp. rate 16, height 1.575 m (5' 2\"), weight 72.6 kg (160 lb), SpO2 100 %.  160 lbs 0 oz  HEENT:  Normocephalic, atraumatic.  PERRLA.  EOM s intact.   Neck:  Supple, non-tender, without lymphadenopathy.  Heart:  No peripheral edema  Lungs:  No SOB  Abdomen:  Soft, non-tender, non-distended.  Normal bowel sounds.  Skin:  Warm and dry, good capillary refill.  Extremities:  Good radial and dorsalis pedis pulses bilaterally, no edema, cyanosis or clubbing.    NEUROLOGICAL EXAMINATION:   Mental status:  Alert and Oriented x 3, speech is fluent.  Cranial nerves:  II-XII intact.   Motor:  Strength is 5/5 throughout the upper and lower extremities  Deltoids:  Right: 5/5   Left:  5/5  Biceps:  Right: 5/5   Left:  NT left UE NOT TESTED patient in sling with arm bandage nondisplaced fracture left radial neck   Triceps: Right: 5/5   Left:  NT                     Wrist Extensors: Right: 5/5   Left:  NT       Wrist Flexors:Right: 5/5   Left:  NT             Hand : Right: 5/5   Left: 4+/5         Hip Flexor: Right: 5/5   Left:  5/5                 Hip Adductor:Right: 5/5   Left:  5/5               Hip Abductor: Right: 5/5   Left:  5/5              Gastroc Soleus:Right: 5/5   Left:  5/5        Tib/Ant:Right: 5/5   Left:  5/5                        EHL:Right: 5/5   Left:  5/5                     Sensation:  Intact throughout to LT   Reflexes:  Negative Babinski.  Negative Clonus.    Coordination:  Smooth finger to nose testing.   Negative pronator drift.  Smooth tandem walking  Gait:  Stable, no difficulty with heel or toe walking.    Cervical examination reveals good range of motion.  No tenderness to palpation of the cervical spine or paraspinous muscles bilaterally. "     Lumbar examination reveals no tenderness of the spine or paraspinous muscles.  Straight leg raise is negative bilaterally.       Data     CBC RESULTS:   Recent Labs   Lab Test 04/19/22  1450   WBC 7.8   RBC 3.99   HGB 12.4   HCT 36.9   MCV 93   MCH 31.1   MCHC 33.6   RDW 12.8        Basic Metabolic Panel:  Lab Results   Component Value Date     04/19/2022      Lab Results   Component Value Date    POTASSIUM 4.0 04/19/2022     Lab Results   Component Value Date    CHLORIDE 99 04/19/2022     Lab Results   Component Value Date    HONG 9.4 04/19/2022     Lab Results   Component Value Date    CO2 22 04/19/2022     Lab Results   Component Value Date    BUN 17 04/19/2022     Lab Results   Component Value Date    CR 1.48 04/19/2022     Lab Results   Component Value Date     04/19/2022     INR:  Lab Results   Component Value Date    INR 0.97 04/19/2022     Images reviewed personally   IMPRESSION:  HEAD CT:  1.  Small high-attenuation focus is seen along the left superior aspect of the anterior falx. This is concerning for a small area of subdural hemorrhage.  2.  No other finding for intracranial hemorrhage, mass, or acute infarct.     FACIAL BONE CT:  1.  Left inferior scalp and lateral facial and periorbital soft tissue swelling/contusion.  2.  No facial fractures.  3.  Degenerative changes temporomandibular joints, right greater than left.  4.  Periapical lucency is seen about the remaining left maxillary and second left mandibular molar.     CERVICAL SPINE CT:  1.  Moderate cervical spondylosis without finding for acute fracture or posttraumatic subluxation.   2.  There is moderate or high-grade foraminal narrowing at C4-5 and C5-6 with more mild canal stenosis at C3-4 and C6-7.    Bee Baig PA-C  [facility] Neurosurgery  O: [phone]

## 2022-04-19 NOTE — Clinical Note
Ana Bunn was seen and treated in our emergency department on 4/19/2022.  She may return to work on 04/26/2022.       If you have any questions or concerns, please don't hesitate to call.      Bereket Bush MD

## 2022-04-19 NOTE — ED PROVIDER NOTES
EMERGENCY DEPARTMENT SIGN OUT NOTE        ED COURSE AND MEDICAL DECISION MAKING  Patient was signed out to me by Dr Yari Barker at 2:35 PM.    In brief, Ana Bunn is a 61 year old female who initially presented for a fall.     At time of sign out, disposition was pending. Pending CT scans     Patient fell on Saturday injuring her left orbit had a CT done that shows questionable subdural hematoma.  Neurosurgery is involved and recommends repeat head CT and possibly discharge home if stable..  Also has a proximal radius fracture that is been splinted.  Not on blood thinners    Repeat head CT shows stable subdural hematoma.  Discussed with neurosurgery who feels patient can be discharged home.  Neurosurgery will reach out to patient and schedule follow-up and repeat CT imaging as well as MRI.    Work note written for patient.  Patient was given instructions regarding return precautions and follow-up instructions.  Referred to [facility] for her elbow fracture.    GCS 15.    ED Course as of 04/19/22 2115   Tue Apr 19, 2022   1122 Ms. Bunn is 51-year-old female who presents here for referral from the clinic.  She was doing well and accidentally fell this weekend falling forward hitting her head and landed on the left arm.  This was an accident did not lose any consciousness not anticoagulated.   1122 Since then she has noticed some dizziness noticed some bruising on the left eye and pain throughout the left arm.   1122 She denies any numbness no tingling.  No chest pain shortness of breath abdominal pain or pain to the lower extremities.  Her body does feel sore.   1123 Examination she is nontoxic cooperative and pleasant obvious ecchymosis located on the upper left eyelid with normal extraocular muscles and no evidence of chemosis she does wear glasses no evidence of nasal septal hematoma no malalignment.   1123 She does not have any midline thoracic or lumbar tenderness palpation she feels  soreness in the mid line and has left trapezius left shoulder humerus elbow forearm wrist and hand tenderness palpation with full range of motion.   1123 Differential diagnoses include but not limited to orbital fracture head injury cervical fracture contusion of the extremity fractured extremity among others.   1124 Patient hypertensive.  Gave her some Tylenol repeat blood pressure will image head face neck left upper extremity.   1312 CT head without concern for subdural the radiologist called me.  I spoke with neurosurgery who states given that these have been on Saturday we could repeat the CT in 6 hours from now.   1312 CT C-spine severe degenerative disease but no fracture spoke with neurosurgery they recommend outpatient follow-up with them.   1312 CT face no fractures.   1312 Left wrist previous surgical hardware intact soft tissue swelling degenerative.  Left elbow ulnar fracture with soft tissue swelling.   1312 Left Shoulder no acute fracture.   1313 Patient was seen at triage.  She subsequently came back to the main emergency department.  Daughter with her.  History again corroborated accidental fall did not lose consciousness does not taking anticoagulation.  There is no numbness no tingling no fecal urinary incontinence.   1313 Patient was placed on a long arm splint with plaster neurovascular intact after placement.   1313 Patient upset about having to wait in the emergency department for repeat CTA.  I encouraged her to stay given that this is concerning.  She is diabetic therefore will order a diabetic meal.  She will prefer to have a hamburger.   1508 Long arm splint placed with plaster tolerated well denies feeling too tired a sling was placed.  Son is now at bedside I spoke with him.   1509 Patient eager to go home but understands that we need to have a repeat CT.   1509 At the time of this dictation patient pending her 6-hour CT head and discussion with neurosurgery.  Patient signed out to   Palm at 1430 pending repeat CT reevaluation and discussion with neurosurgery   1510 Clinical impression and decision making 61-year-old female diabetic accidentally fell on Saturday.  Fell forward also landed on the left arm did not lose consciousness not anticoagulated today felt dizzy and has left arm pain.  Her whole back feels sore but there is no numbness no tingling no fecal or urinary incontinence   1510 Exam obvious ecchymosis to the left eye healing but no hyphema extraocular muscles are intact he does not have any midline thoracic or lumbar tenderness palpation but does have paravertebral mostly right trapezius tenderness palpation   1510 Negative Tommy sign.  Gait is steady.   1510 Trauma evaluation included a CT head CT C-spine and CT face.  CT head reveals a subdural.  Discussed with neurosurgery given that this happened on Saturday and she is not anticoagulated.  Recommend repeat CT at 6 hours.   1511 CT C-spine has severe degenerative disease.  No midline cervical pain no numbness no tingling no weakness.  Discussed with neurosurgery she will follow-up with them.   1511 CT face with no fractures.  She is also found to have ulnar fracture.  This is closed.  Neurologically intact.  Placed on a plaster long-arm splint.   1511 Platelet Count: 222  While this was an accidental fall patient denies any chest pain shortness of breath vomiting or diarrhea given that she does have a subdural we will order some basic labs.   1517 % Eosinophils: 1         FINAL IMPRESSION    1. Subdural hematoma (H)    2. Fall, initial encounter    3. Contusion of face, scalp and neck, initial encounter    4. History of diabetes mellitus    5. Cervical spondylosis    6. Hypertension, unspecified type    7. Closed displaced fracture of head of left radius, initial encounter        ED MEDS  Medications   acetaminophen (TYLENOL) tablet 650 mg (650 mg Oral Given 4/19/22 1125)       LAB  Labs Ordered and Resulted from Time of ED  Arrival to Time of ED Departure   BASIC METABOLIC PANEL - Abnormal       Result Value    Sodium 132 (*)     Potassium 4.0      Chloride 99      Carbon Dioxide (CO2) 22      Anion Gap 11      Urea Nitrogen 17      Creatinine 1.48 (*)     Calcium 9.4      Glucose 145 (*)     GFR Estimate 40 (*)    INR - Normal    INR 0.97     PARTIAL THROMBOPLASTIN TIME - Normal    aPTT 32     CBC WITH PLATELETS AND DIFFERENTIAL    WBC Count 7.8      RBC Count 3.99      Hemoglobin 12.4      Hematocrit 36.9      MCV 93      MCH 31.1      MCHC 33.6      RDW 12.8      Platelet Count 222      % Neutrophils 62      % Lymphocytes 31      % Monocytes 6      % Eosinophils 1      % Basophils 0      % Immature Granulocytes 0      NRBCs per 100 WBC 0      Absolute Neutrophils 4.8      Absolute Lymphocytes 2.4      Absolute Monocytes 0.5      Absolute Eosinophils 0.1      Absolute Basophils 0.0      Absolute Immature Granulocytes 0.0      Absolute NRBCs 0.0           RADIOLOGY    Head CT w/o contrast   Final Result   IMPRESSION:   1.  Unchanged subcentimeter hyperdensity along the left aspect of the falx.   2.  This may be a small subdural hematoma. Given its stability an area of mineralization is not entirely excluded.   3.  The remainder of the exam is normal.      XR Wrist Left G/E 3 Views   Final Result   IMPRESSION: Postop chronic healed volar plate and screw fixed distal radius fracture. Hardware appears intact without evidence for loosening or failure. 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 No acute left wrist fracture or    dislocation. 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 Mild wrist soft tissue swelling.      XR Shoulder Left G/E 3 Views   Final Result   IMPRESSION: No fracture or dislocation. Osteopenia. No degenerative changes.       XR Elbow Left G/E 3 Views   Final Result   IMPRESSION: Nondisplaced fracture left radial neck. Moderate to large-sized left elbow joint effusion. Anatomic  alignment left elbow. No significant joint space narrowing. No definitive distal humerus or proximal ulna fracture.      NOTE: ABNORMAL REPORT      THE DICTATION ABOVE DESCRIBES AN ABNORMALITY FOR WHICH FOLLOW-UP IS NEEDED.       Cervical spine CT w/o contrast   Final Result   IMPRESSION:   HEAD CT:   1.  Small high-attenuation focus is seen along the left superior aspect of the anterior falx. This is concerning for a small area of subdural hemorrhage.      2.  No other finding for intracranial hemorrhage, mass, or acute infarct.      FACIAL BONE CT:   1.  Left inferior scalp and lateral facial and periorbital soft tissue swelling/contusion.      2.  No facial fractures.      3.  Degenerative changes temporomandibular joints, right greater than left.      4.  Periapical lucency is seen about the remaining left maxillary and second left mandibular molar.      CERVICAL SPINE CT:   1.  Moderate cervical spondylosis without finding for acute fracture or posttraumatic subluxation.       2.  There is moderate or high-grade foraminal narrowing at C4-5 and C5-6 with more mild canal stenosis at C3-4 and C6-7.      Findings concerning for subdural hemorrhage discussed with Dr. Yari Barker at approximately 12:00 PM.      CT Facial Bones without Contrast   Final Result   IMPRESSION:   HEAD CT:   1.  Small high-attenuation focus is seen along the left superior aspect of the anterior falx. This is concerning for a small area of subdural hemorrhage.      2.  No other finding for intracranial hemorrhage, mass, or acute infarct.      FACIAL BONE CT:   1.  Left inferior scalp and lateral facial and periorbital soft tissue swelling/contusion.      2.  No facial fractures.      3.  Degenerative changes temporomandibular joints, right greater than left.      4.  Periapical lucency is seen about the remaining left maxillary and second left mandibular molar.      CERVICAL SPINE CT:   1.  Moderate cervical spondylosis without finding for  acute fracture or posttraumatic subluxation.       2.  There is moderate or high-grade foraminal narrowing at C4-5 and C5-6 with more mild canal stenosis at C3-4 and C6-7.      Findings concerning for subdural hemorrhage discussed with Dr. Yari Barker at approximately 12:00 PM.      Head CT w/o contrast   Final Result   IMPRESSION:   HEAD CT:   1.  Small high-attenuation focus is seen along the left superior aspect of the anterior falx. This is concerning for a small area of subdural hemorrhage.      2.  No other finding for intracranial hemorrhage, mass, or acute infarct.      FACIAL BONE CT:   1.  Left inferior scalp and lateral facial and periorbital soft tissue swelling/contusion.      2.  No facial fractures.      3.  Degenerative changes temporomandibular joints, right greater than left.      4.  Periapical lucency is seen about the remaining left maxillary and second left mandibular molar.      CERVICAL SPINE CT:   1.  Moderate cervical spondylosis without finding for acute fracture or posttraumatic subluxation.       2.  There is moderate or high-grade foraminal narrowing at C4-5 and C5-6 with more mild canal stenosis at C3-4 and C6-7.      Findings concerning for subdural hemorrhage discussed with Dr. Yari Barker at approximately 12:00 PM.          DISCHARGE MEDS  Discharge Medication List as of 4/19/2022  8:37 PM      START taking these medications    Details   oxyCODONE (ROXICODONE) 5 MG tablet Take 1 tablet (5 mg) by mouth every 6 hours as needed for pain, Disp-6 tablet, R-0, Local Print               Bereket Bush MD  Emergency Medicine  [facility] EMERGENCY DEPARTMENT  [address] [place] [address] [id number] [phone]       Bereket Bush MD  04/19/22 [id number]

## 2022-04-19 NOTE — LETTER
[facility] EMERGENCY DEPARTMENT  [address] [address]  Phone: [phone]    November 30, 2023        Ana [place]unn  [address] [address]          To whom it may concern:    RE: Ana Bunn    Patient was seen and treated today at our clinic and missed work.  She is not improved at all with my treatment plan for a spinal infection.  I have stopped her medications.  I am referring her to neurosurgery.  She can't bend over or really walk to speak of, and can not do .  I would not expect her to be able to do these aspects of her job for the next 2 months.         Please contact me for questions or concerns.      Sincerely,  AURE Luther MD  [phone]      No name on file

## 2022-04-19 NOTE — DISCHARGE INSTRUCTIONS
Read and follow the discharge instructions.    You also need to follow-up with neurosurgery clinic.  Call to make a follow-up appointment for reevaluation.  Neurosurgery states they will call you to arrange follow-up.  Return to the ER for worsening headache, nausea, vomiting, development of confusion or other concerns.  Recommend not using aspirin or any type of blood thinner.  Recommend avoiding any strenuous physical activity until cleared by neurosurgery      You broke the bone on your elbow is the radius not the ulna  Keep this cast on until you see the orthopedic doctor  Call tomorrow to make a follow-up appointment    Take Tylenol ibuprofen as needed for swelling.  For breakthrough pain you can use oxycodone    Return or call 911 if you feeling dizzy feeling fainting vomiting and numbness tingling or any other concerns

## 2022-04-19 NOTE — ED PROVIDER NOTES
EMERGENCY DEPARTMENT ENCOUNTER      NAME: Ana Bunn  AGE: 61 year old female  YOB: 1960  MRN: [id number]  EVALUATION DATE & TIME: No admission date for patient encounter.    PCP: [facility]    ED PROVIDER: Yari Barker M.D.      CHIEF COMPLAINT     Chief Complaint   Patient presents with     Fall         FINAL IMPRESSION:     1. Subdural hematoma (H)    2. Fall, initial encounter    3. Contusion of face, scalp and neck, initial encounter    4. History of diabetes mellitus    5. Cervical spondylosis    6. Hypertension, unspecified type    7. Closed displaced fracture of head of left radius, initial encounter          MEDICAL DECISION MAKING:       Pertinent Labs & Imaging studies reviewed. (See chart for details)    61 year old female presents to the Emergency Department for evaluation of fall sent from urgent care.    ED Course as of 04/19/22 1521   Tue Apr 19, 2022   1122 Ms. Bunn is 51-year-old female who presents here for referral from the clinic.  She was doing well and accidentally fell this weekend falling forward hitting her head and landed on the left arm.  This was an accident did not lose any consciousness not anticoagulated.   1122 Since then she has noticed some dizziness noticed some bruising on the left eye and pain throughout the left arm.   1122 She denies any numbness no tingling.  No chest pain shortness of breath abdominal pain or pain to the lower extremities.  Her body does feel sore.   1123 Examination she is nontoxic cooperative and pleasant obvious ecchymosis located on the upper left eyelid with normal extraocular muscles and no evidence of chemosis she does wear glasses no evidence of nasal septal hematoma no malalignment.   1123 She does not have any midline thoracic or lumbar tenderness palpation she feels soreness in the mid line and has left trapezius left shoulder humerus elbow forearm wrist and hand tenderness palpation with full  range of motion.   1123 Differential diagnoses include but not limited to orbital fracture head injury cervical fracture contusion of the extremity fractured extremity among others.   1124 Patient hypertensive.  Gave her some Tylenol repeat blood pressure will image head face neck left upper extremity.   1312 CT head without concern for subdural the radiologist called me.  I spoke with neurosurgery who states given that these have been on Saturday we could repeat the CT in 6 hours from now.   1312 CT C-spine severe degenerative disease but no fracture spoke with neurosurgery they recommend outpatient follow-up with them.   1312 CT face no fractures.   1312 Left wrist previous surgical hardware intact soft tissue swelling degenerative.  Left elbow ulnar fracture with soft tissue swelling.   1312 Left Shoulder no acute fracture.   1313 Patient was seen at triage.  She subsequently came back to the main emergency department.  Daughter with her.  History again corroborated accidental fall did not lose consciousness does not taking anticoagulation.  There is no numbness no tingling no fecal urinary incontinence.   1313 Patient was placed on a long arm splint with plaster neurovascular intact after placement.   1313 Patient upset about having to wait in the emergency department for repeat CTA.  I encouraged her to stay given that this is concerning.  She is diabetic therefore will order a diabetic meal.  She will prefer to have a hamburger.   1508 Long arm splint placed with plaster tolerated well denies feeling too tired a sling was placed.  Son is now at bedside I spoke with him.   1509 Patient eager to go home but understands that we need to have a repeat CT.   1509 At the time of this dictation patient pending her 6-hour CT head and discussion with neurosurgery.  Patient signed out to Dr. Bush at 1430 pending repeat CT reevaluation and discussion with neurosurgery   1510 Clinical impression and decision making  61-year-old female diabetic accidentally fell on Saturday.  Fell forward also landed on the left arm did not lose consciousness not anticoagulated today felt dizzy and has left arm pain.  Her whole back feels sore but there is no numbness no tingling no fecal or urinary incontinence   1510 Exam obvious ecchymosis to the left eye healing but no hyphema extraocular muscles are intact he does not have any midline thoracic or lumbar tenderness palpation but does have paravertebral mostly right trapezius tenderness palpation   1510 Negative Tommy sign.  Gait is steady.   1510 Trauma evaluation included a CT head CT C-spine and CT face.  CT head reveals a subdural.  Discussed with neurosurgery given that this happened on Saturday and she is not anticoagulated.  Recommend repeat CT at 6 hours.   1511 CT C-spine has severe degenerative disease.  No midline cervical pain no numbness no tingling no weakness.  Discussed with neurosurgery she will follow-up with them.   1511 CT face with no fractures.  She is also found to have ulnar fracture.  This is closed.  Neurologically intact.  Placed on a plaster long-arm splint.   1511 Platelet Count: 222  While this was an accidental fall patient denies any chest pain shortness of breath vomiting or diarrhea given that she does have a subdural we will order some basic labs.   1517 % Eosinophils: 1         Vital Signs: hypertensive  EKG:none  Imaging:ct head subdural ct cface no fracture ct cspine stenosis degenerative no fracture elbow radial head fracture  Home Meds: reviewed  ED meds/abx: tylenol  Fluids: oral    Labs  K  Cr  Wbc 7.8  Hgb 12.4  Platelets 222      Review of Previous Records  Clinic note 4/19/2022    DM2, A1C last was 7.4  HTN, uncontrolled. Was started on losartan last appt. Taking as prescribed  Microalbuminuria - PCP requested ok from patient for e consult with nephrologist. Patient has not responded. This was not addressed at todays appt.   "      Consults  Radiology  Neurosurgery - Bee Baig PA-C    ED COURSE     10:45 AM I met the patient and performed my initial interview and exam.    11:28 AM reevaluated and updated    12:04 PM I spoke with radiology regarding patient's imaging results.     12:18 PM I rechecked and updated the patient on imaging results and plan of care. Patient's daughter is now at bedside.     12:39 PM I spoke with ANAYA Gamboa with neurosurgery, regarding patient plan of care.   Recommends CT in 6 hours.    12:47 PM I rechecked and updated the patient on plan of care.     1:03 PM Patient is hungry. No numbness or tingling.     2:09 PM reevaluated and updated.    2:23 PM son at bedside.  Updated on mother's diagnosis.  Patient in agreement has been able to eat.  Awaiting repeat CT.    At the conclusion of the encounter I discussed the results of all of the tests and the disposition. The questions were answered. The patient and daughter acknowledged understanding and was agreeable with the care plan.     MEDICATIONS GIVEN IN THE EMERGENCY:     Medications   acetaminophen (TYLENOL) tablet 650 mg (650 mg Oral Given 4/19/22 1125)       NEW PRESCRIPTIONS STARTED AT TODAY'S ER VISIT     New Prescriptions    No medications on file          =================================================================    HPI     Patient information was obtained from: patient, daughter    Use of : N/A         Ana Bunn is a 61 year old female with history of hypertension, T2DM, and CKD who presents by walk in from [facility] for evaluation of fall.     Patient reports that she had a mechanical fall when she tripped over a curb around 1600 on Saturday (3 days ago). Patient was not light headed, shortness of breath, or dizzy prior to her fall, it was completely mechanical. She fell forward onto her left arm and face. She denies any loose teeth.     Patient reports pain in her left arm and wrist. She feels \"unbalanced\". " "Patient is not on blood thinners.     Denies chest pain, shortness of breath, dizziness, abdominal pain, hip pain, nausea, vomiting, diarrhea, numbness, tingling, incontinence, or any other complaints at this time.     REVIEW OF SYSTEMS   Review of Systems   Eyes: Positive for redness (left).   Respiratory: Negative for shortness of breath.    Cardiovascular: Negative for chest pain.   Gastrointestinal: Negative for abdominal pain, diarrhea, nausea and vomiting.   Genitourinary: Negative.    Musculoskeletal:        Positive for left arm pain.   Neurological: Negative for dizziness, light-headedness and numbness.   All other systems reviewed and are negative.     PAST MEDICAL HISTORY:   History reviewed. No pertinent past medical history.    PAST SURGICAL HISTORY:   History reviewed. No pertinent surgical history.      CURRENT MEDICATIONS:   ondansetron (ZOFRAN ODT) 4 MG ODT tab         ALLERGIES:   No Known Allergies    FAMILY HISTORY:   History reviewed. No pertinent family history.    SOCIAL HISTORY:     Social History     Socioeconomic History     Marital status:        VITALS:   BP (!) 155/99   Pulse 84   Temp 98.1  F (36.7  C)   Resp 16   Ht 1.575 m (5' 2\")   Wt 72.6 kg (160 lb)   SpO2 100%   BMI 29.26 kg/m      PHYSICAL EXAM     Physical Exam  Vitals and nursing note reviewed.   Constitutional:       Appearance: Normal appearance.   Eyes:        Comments: No martínez sign.   Cardiovascular:      Rate and Rhythm: Normal rate and regular rhythm.      Pulses: Normal pulses.      Heart sounds: Normal heart sounds.   Pulmonary:      Effort: Pulmonary effort is normal.      Breath sounds: Normal breath sounds.   Musculoskeletal:         General: Tenderness present.      Comments: Left shoulder left humerus left elbow left forearm    Skin:     Capillary Refill: Capillary refill takes less than 2 seconds.   Neurological:      Mental Status: She is alert and oriented to person, place, and time. "   Psychiatric:         Behavior: Behavior normal.         Physical Exam   Constitutional: Well appearing. No distress.     Head: Atraumatic.     Nose: Nose normal.     Mouth/Throat: Oropharynx is clear and moist.     Eyes: Ecchymosis over left upper eyelid. No hyphema. EOM are normal. Pupils are equal, round, and reactive to light.     Ears: No hemotympanum. Bilateral pearly white TMs.     Neck: Normal range of motion. Neck supple.     Cardiovascular: Normal rate, regular rhythm and normal heart sounds.      Pulmonary/Chest: Normal effort  and breath sounds normal.     Abdominal: Soft, non tender.     Musculoskeletal: Left forearm tenderness to palpation. No left wrist tenderness to palpation.      Neurological: No focal deficits.     Lymphatics: No lower extremity edema    : n/a    Skin: Skin is warm and dry.     Psychiatric: Normal mood and affect. Behavior is normal.       LAB:     All pertinent labs reviewed and interpreted.  Labs Ordered and Resulted from Time of ED Arrival to Time of ED Departure   BASIC METABOLIC PANEL - Abnormal       Result Value    Sodium 132 (*)     Potassium 4.0      Chloride 99      Carbon Dioxide (CO2) 22      Anion Gap 11      Urea Nitrogen 17      Creatinine 1.48 (*)     Calcium 9.4      Glucose 145 (*)     GFR Estimate 40 (*)    INR - Normal    INR 0.97     PARTIAL THROMBOPLASTIN TIME - Normal    aPTT 32     CBC WITH PLATELETS AND DIFFERENTIAL    WBC Count 7.8      RBC Count 3.99      Hemoglobin 12.4      Hematocrit 36.9      MCV 93      MCH 31.1      MCHC 33.6      RDW 12.8      Platelet Count 222      % Neutrophils 62      % Lymphocytes 31      % Monocytes 6      % Eosinophils 1      % Basophils 0      % Immature Granulocytes 0      NRBCs per 100 WBC 0      Absolute Neutrophils 4.8      Absolute Lymphocytes 2.4      Absolute Monocytes 0.5      Absolute Eosinophils 0.1      Absolute Basophils 0.0      Absolute Immature Granulocytes 0.0      Absolute NRBCs 0.0          RADIOLOGY:      Reviewed all pertinent imaging. Please see official radiology report.  XR Wrist Left G/E 3 Views   Final Result   IMPRESSION: Postop chronic healed volar plate and screw fixed distal radius fracture. Hardware appears intact without evidence for loosening or failure. 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 No acute left wrist fracture or    dislocation. 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 Mild wrist soft tissue swelling.      XR Shoulder Left G/E 3 Views   Final Result   IMPRESSION: No fracture or dislocation. Osteopenia. No degenerative changes.       XR Elbow Left G/E 3 Views   Final Result   IMPRESSION: Nondisplaced fracture left radial neck. Moderate to large-sized left elbow joint effusion. Anatomic alignment left elbow. No significant joint space narrowing. No definitive distal humerus or proximal ulna fracture.      NOTE: ABNORMAL REPORT      THE DICTATION ABOVE DESCRIBES AN ABNORMALITY FOR WHICH FOLLOW-UP IS NEEDED.       Cervical spine CT w/o contrast   Final Result   IMPRESSION:   HEAD CT:   1.  Small high-attenuation focus is seen along the left superior aspect of the anterior falx. This is concerning for a small area of subdural hemorrhage.      2.  No other finding for intracranial hemorrhage, mass, or acute infarct.      FACIAL BONE CT:   1.  Left inferior scalp and lateral facial and periorbital soft tissue swelling/contusion.      2.  No facial fractures.      3.  Degenerative changes temporomandibular joints, right greater than left.      4.  Periapical lucency is seen about the remaining left maxillary and second left mandibular molar.      CERVICAL SPINE CT:   1.  Moderate cervical spondylosis without finding for acute fracture or posttraumatic subluxation.       2.  There is moderate or high-grade foraminal narrowing at C4-5 and C5-6 with more mild canal stenosis at C3-4 and C6-7.      Findings concerning for subdural hemorrhage discussed with   Yari Barker at approximately 12:00 PM.      CT Facial Bones without Contrast   Final Result   IMPRESSION:   HEAD CT:   1.  Small high-attenuation focus is seen along the left superior aspect of the anterior falx. This is concerning for a small area of subdural hemorrhage.      2.  No other finding for intracranial hemorrhage, mass, or acute infarct.      FACIAL BONE CT:   1.  Left inferior scalp and lateral facial and periorbital soft tissue swelling/contusion.      2.  No facial fractures.      3.  Degenerative changes temporomandibular joints, right greater than left.      4.  Periapical lucency is seen about the remaining left maxillary and second left mandibular molar.      CERVICAL SPINE CT:   1.  Moderate cervical spondylosis without finding for acute fracture or posttraumatic subluxation.       2.  There is moderate or high-grade foraminal narrowing at C4-5 and C5-6 with more mild canal stenosis at C3-4 and C6-7.      Findings concerning for subdural hemorrhage discussed with Dr. Yari Barker at approximately 12:00 PM.      Head CT w/o contrast   Final Result   IMPRESSION:   HEAD CT:   1.  Small high-attenuation focus is seen along the left superior aspect of the anterior falx. This is concerning for a small area of subdural hemorrhage.      2.  No other finding for intracranial hemorrhage, mass, or acute infarct.      FACIAL BONE CT:   1.  Left inferior scalp and lateral facial and periorbital soft tissue swelling/contusion.      2.  No facial fractures.      3.  Degenerative changes temporomandibular joints, right greater than left.      4.  Periapical lucency is seen about the remaining left maxillary and second left mandibular molar.      CERVICAL SPINE CT:   1.  Moderate cervical spondylosis without finding for acute fracture or posttraumatic subluxation.       2.  There is moderate or high-grade foraminal narrowing at C4-5 and C5-6 with more mild canal stenosis at C3-4 and C6-7.      Findings  concerning for subdural hemorrhage discussed with Dr. Yari Barker at approximately 12:00 PM.      Head CT w/o contrast    (Results Pending)        EKG:       I have independently reviewed and interpreted the EKG(s) documented above.      PROCEDURES:     Procedures    PROCEDURE: Splint Placement   INDICATIONS: left radial neck fracture   PROCEDURE PROVIDER: Dr Yari Barker   NOTE:  A Long arm splint made of Plaster was applied to the Left upper extremity by the above provider. As noted in the physical exam, distal CMS was intact prior to placement. The splint was checked and the fit was adjusted to ensure proper positioning after placement. Sensation and circulation, as well as motor function, are unchanged after splint placement and the patient is more comfortable with the splint in place.         I, Lopez Norris, am serving as a scribe to document services personally performed by Dr. Barker based on my observation and the provider's statements to me. I, Yari Barker MD attest that Lopez Norris is acting in a scribe capacity, has observed my performance of the services and has documented them in accordance with my direction.    Yari Barker M.D.  Emergency Medicine  [facility] EMERGENCY DEPARTMENT  [facility] [facility] [id number] [phone]  Dept: [phone]     Yari Barker MD  04/19/22 8519

## 2022-04-19 NOTE — PROGRESS NOTES
Neurosurgery Treatment Plan:   Reviewed patients chart  Called by ED for 61 year old right handed female status post mechanical fall on Saturday, not a blood thinners. Complaint of left arm pain, denies headaches, nausea, emesis, visual changes. Has complaint of being unbalanced, denies urinary incontinence or saddle anesthesia   Per provider neurologically intact with good strength in all extremities      Images:   Xray with noted nondisplaced fracture left radial neck.  Cervical CT without acute abnormalities noted neuroforaminal stenosis at C4-C5 and C5-C6 secondary to degenerative disc   Head CT with small high-attenuation focus is seen along the left superior aspect of the anterior falx- SDH vs falx      Plan:   Patient discussed with Dr. Hartley   Repeat head CT in 6 hours if stable okay to discharge from neurosurgical standpoint   Planned follow up outpatient with cervical MRI for further evaluation of degenerative cervical spinal stenosis and complaint of unsteady gait          Bee Baig PA-C  [facility] Neurosurgery  O: [phone]

## 2022-04-19 NOTE — ED TRIAGE NOTES
Pt coming from  clinic where she was being seen for a fall that occurred on Saturday around 1600.  Pt states she tripped and denies being on thinners.  Pt states sent over for xray, ct and eval.  Pt has left black eye.  Pt also c/o feeling off balance and left arm pain.

## 2022-04-20 ENCOUNTER — TRANSFERRED RECORDS (OUTPATIENT)
Dept: HEALTH INFORMATION MANAGEMENT | Facility: CLINIC | Age: 62
End: 2022-04-20
Payer: COMMERCIAL

## 2022-04-20 NOTE — PROGRESS NOTES
Discussed repeat CT finding's with Dr Bush. No change in area of questions - SDH vs mineralization along the falx. Reviewed films personally. OK to discharge. We will arrange short term follow up. Avoid any meds that may increase risk of bleeding as well as any strenuous activity. Maintain normal BP for age.     FRANKLYN Acosta  [facility] Neurosurgery  O: [phone]

## 2022-04-20 NOTE — ED NOTES
Pt verbalized understanding of all discharge instructions and follow-up care. Pt's daughter also verbalized understanding of instructions. Pt ambulated out of ED with steady gait. Pt's arm sling adjusted to patient comfort and education given. Pt is alert and orientedx3,breathing is easy and nonlabored, steady ambulatory gait out of ED.

## 2023-04-14 ENCOUNTER — HOSPITAL ENCOUNTER (EMERGENCY)
Facility: HOSPITAL | Age: 63
Discharge: HOME | End: 2023-04-14
Attending: EMERGENCY MEDICINE | Admitting: EMERGENCY MEDICINE
Payer: COMMERCIAL

## 2023-04-14 VITALS
DIASTOLIC BLOOD PRESSURE: 89 MMHG | BODY MASS INDEX: 29.44 KG/M2 | WEIGHT: 160 LBS | HEIGHT: 62 IN | OXYGEN SATURATION: 100 % | SYSTOLIC BLOOD PRESSURE: 179 MMHG | RESPIRATION RATE: 18 BRPM | HEART RATE: 80 BPM | TEMPERATURE: 99.3 F

## 2023-04-14 DIAGNOSIS — M54.50 ACUTE LOW BACK PAIN, UNSPECIFIED BACK PAIN LATERALITY, UNSPECIFIED WHETHER SCIATICA PRESENT: ICD-10-CM

## 2023-04-14 DIAGNOSIS — N39.0 ACUTE UTI: ICD-10-CM

## 2023-04-14 DIAGNOSIS — R42 LIGHTHEADEDNESS: ICD-10-CM

## 2023-04-14 LAB
ALBUMIN UR-MCNC: 200 MG/DL
ANION GAP SERPL CALCULATED.3IONS-SCNC: 9 MMOL/L (ref 7–15)
APPEARANCE UR: ABNORMAL
BACTERIA #/AREA URNS HPF: ABNORMAL /HPF
BASOPHILS # BLD AUTO: 0 10E3/UL (ref 0–0.2)
BASOPHILS NFR BLD AUTO: 0 %
BILIRUB UR QL STRIP: NEGATIVE
BUN SERPL-MCNC: 21.2 MG/DL (ref 8–23)
CALCIUM SERPL-MCNC: 9.3 MG/DL (ref 8.8–10.2)
CHLORIDE SERPL-SCNC: 95 MMOL/L (ref 98–107)
COLOR UR AUTO: ABNORMAL
CREAT SERPL-MCNC: 1.44 MG/DL (ref 0.51–0.95)
DEPRECATED HCO3 PLAS-SCNC: 26 MMOL/L (ref 22–29)
EOSINOPHIL # BLD AUTO: 0.1 10E3/UL (ref 0–0.7)
EOSINOPHIL NFR BLD AUTO: 2 %
ERYTHROCYTE [DISTWIDTH] IN BLOOD BY AUTOMATED COUNT: 12.7 % (ref 10–15)
GFR SERPL CREATININE-BSD FRML MDRD: 41 ML/MIN/1.73M2
GLUCOSE BLDC GLUCOMTR-MCNC: 201 MG/DL (ref 70–99)
GLUCOSE SERPL-MCNC: 199 MG/DL (ref 70–99)
GLUCOSE UR STRIP-MCNC: NEGATIVE MG/DL
HCT VFR BLD AUTO: 38.2 % (ref 35–47)
HGB BLD-MCNC: 13.1 G/DL (ref 11.7–15.7)
HGB UR QL STRIP: NEGATIVE
IMM GRANULOCYTES # BLD: 0 10E3/UL
IMM GRANULOCYTES NFR BLD: 1 %
KETONES UR STRIP-MCNC: NEGATIVE MG/DL
LACTATE SERPL-SCNC: 0.9 MMOL/L (ref 0.7–2)
LEUKOCYTE ESTERASE UR QL STRIP: ABNORMAL
LYMPHOCYTES # BLD AUTO: 1.3 10E3/UL (ref 0.8–5.3)
LYMPHOCYTES NFR BLD AUTO: 23 %
MCH RBC QN AUTO: 32 PG (ref 26.5–33)
MCHC RBC AUTO-ENTMCNC: 34.3 G/DL (ref 31.5–36.5)
MCV RBC AUTO: 93 FL (ref 78–100)
MONOCYTES # BLD AUTO: 0.3 10E3/UL (ref 0–1.3)
MONOCYTES NFR BLD AUTO: 6 %
NEUTROPHILS # BLD AUTO: 3.9 10E3/UL (ref 1.6–8.3)
NEUTROPHILS NFR BLD AUTO: 68 %
NITRATE UR QL: POSITIVE
NRBC # BLD AUTO: 0 10E3/UL
NRBC BLD AUTO-RTO: 0 /100
PH UR STRIP: 6.5 [PH] (ref 5–7)
PLATELET # BLD AUTO: 208 10E3/UL (ref 150–450)
POTASSIUM SERPL-SCNC: 4.2 MMOL/L (ref 3.4–5.3)
RBC # BLD AUTO: 4.09 10E6/UL (ref 3.8–5.2)
RBC URINE: 0 /HPF
SODIUM SERPL-SCNC: 130 MMOL/L (ref 136–145)
SP GR UR STRIP: 1.01 (ref 1–1.03)
SQUAMOUS EPITHELIAL: <1 /HPF
TROPONIN T SERPL HS-MCNC: 8 NG/L
UROBILINOGEN UR STRIP-MCNC: <2 MG/DL
WBC # BLD AUTO: 5.7 10E3/UL (ref 4–11)
WBC CLUMPS #/AREA URNS HPF: PRESENT /HPF
WBC URINE: 104 /HPF

## 2023-04-14 PROCEDURE — 87040 BLOOD CULTURE FOR BACTERIA: CPT | Mod: 59 | Performed by: EMERGENCY MEDICINE

## 2023-04-14 PROCEDURE — 82962 GLUCOSE BLOOD TEST: CPT

## 2023-04-14 PROCEDURE — 80048 BASIC METABOLIC PNL TOTAL CA: CPT | Performed by: EMERGENCY MEDICINE

## 2023-04-14 PROCEDURE — 93005 ELECTROCARDIOGRAM TRACING: CPT | Performed by: EMERGENCY MEDICINE

## 2023-04-14 PROCEDURE — 83605 ASSAY OF LACTIC ACID: CPT | Performed by: EMERGENCY MEDICINE

## 2023-04-14 PROCEDURE — 84484 ASSAY OF TROPONIN QUANT: CPT | Performed by: EMERGENCY MEDICINE

## 2023-04-14 PROCEDURE — 96361 HYDRATE IV INFUSION ADD-ON: CPT

## 2023-04-14 PROCEDURE — 250N000011 HC RX IP 250 OP 636: Performed by: EMERGENCY MEDICINE

## 2023-04-14 PROCEDURE — 87186 SC STD MICRODIL/AGAR DIL: CPT | Mod: 59 | Performed by: STUDENT IN AN ORGANIZED HEALTH CARE EDUCATION/TRAINING PROGRAM

## 2023-04-14 PROCEDURE — 99284 EMERGENCY DEPT VISIT MOD MDM: CPT | Mod: 25

## 2023-04-14 PROCEDURE — 36415 COLL VENOUS BLD VENIPUNCTURE: CPT | Performed by: EMERGENCY MEDICINE

## 2023-04-14 PROCEDURE — 96365 THER/PROPH/DIAG IV INF INIT: CPT

## 2023-04-14 PROCEDURE — 81001 URINALYSIS AUTO W/SCOPE: CPT | Performed by: STUDENT IN AN ORGANIZED HEALTH CARE EDUCATION/TRAINING PROGRAM

## 2023-04-14 PROCEDURE — 258N000003 HC RX IP 258 OP 636: Performed by: EMERGENCY MEDICINE

## 2023-04-14 PROCEDURE — 85025 COMPLETE CBC W/AUTO DIFF WBC: CPT | Performed by: EMERGENCY MEDICINE

## 2023-04-14 RX ORDER — CEFDINIR 300 MG/1
300 CAPSULE ORAL 2 TIMES DAILY
Qty: 14 CAPSULE | Refills: 0 | Status: SHIPPED | OUTPATIENT
Start: 2023-04-14 | End: 2023-04-21

## 2023-04-14 RX ORDER — OXYCODONE HYDROCHLORIDE 5 MG/1
5 TABLET ORAL EVERY 4 HOURS PRN
Qty: 12 TABLET | Refills: 0 | Status: SHIPPED | OUTPATIENT
Start: 2023-04-14 | End: 2023-04-17

## 2023-04-14 RX ORDER — KETOROLAC TROMETHAMINE 15 MG/ML
15 INJECTION, SOLUTION INTRAMUSCULAR; INTRAVENOUS ONCE
Status: DISCONTINUED | OUTPATIENT
Start: 2023-04-14 | End: 2023-04-14 | Stop reason: HOSPADM

## 2023-04-14 RX ORDER — ONDANSETRON 4 MG/1
4 TABLET, ORALLY DISINTEGRATING ORAL EVERY 6 HOURS PRN
Qty: 10 TABLET | Refills: 0 | Status: SHIPPED | OUTPATIENT
Start: 2023-04-14 | End: 2023-04-17

## 2023-04-14 RX ORDER — CEFTRIAXONE 1 G/1
1 INJECTION, POWDER, FOR SOLUTION INTRAMUSCULAR; INTRAVENOUS ONCE
Status: COMPLETED | OUTPATIENT
Start: 2023-04-14 | End: 2023-04-14

## 2023-04-14 RX ADMIN — SODIUM CHLORIDE 1000 ML: 9 INJECTION, SOLUTION INTRAVENOUS at 13:52

## 2023-04-14 RX ADMIN — CEFTRIAXONE SODIUM 1 G: 1 INJECTION, POWDER, FOR SOLUTION INTRAMUSCULAR; INTRAVENOUS at 15:03

## 2023-04-14 ASSESSMENT — ENCOUNTER SYMPTOMS
DIZZINESS: 1
ABDOMINAL PAIN: 0
CHILLS: 1
DYSURIA: 1
FREQUENCY: 1
HEADACHES: 1
PALPITATIONS: 1
VOMITING: 0
NAUSEA: 1
FEVER: 0

## 2023-04-14 ASSESSMENT — ACTIVITIES OF DAILY LIVING (ADL): ADLS_ACUITY_SCORE: 35

## 2023-04-14 NOTE — ED NOTES
Discharge instructions reviewed with patient with no further questions. States she is feeling much better. Discharged ambulatory with daughter.

## 2023-04-14 NOTE — ED NOTES
Pt reports pain is 1/10 and refuses toradol at this time. Possibly will take later. Pt also reports she was supposed to start taking lisinopril today, has not yet taken dose.

## 2023-04-14 NOTE — ED PROVIDER NOTES
Emergency Department Encounter      NAME: Ana Bunn  AGE: 62 year old female  YOB: 1960  MRN: [id number]  EVALUATION DATE & TIME: No admission date for patient encounter.    [facility]    ED PROVIDER: Carter Toure M.D.      Chief Complaint   Patient presents with     Palpitations     Near Syncope         FINAL IMPRESSION:  1. Acute UTI    2. Acute low back pain, unspecified back pain laterality, unspecified whether sciatica present    3. Lightheadedness        MEDICAL DECISION MAKIN:06 PM I met with the patient, obtained history, performed an initial exam, and discussed options and plan for diagnostics and treatment here in the ED.   2:28 PM I rechecked the patient and updated them on laboratory and imaging results.     This patient is a 62-year-old female with a history of hypertension chronic kidney disease and diabetes who presents with a feeling of lightheadedness.  She says that she began taking Bactrim for a UTI that was diagnosed yesterday.  She has been having some chills and nausea and lower back pain.  On exam she did not have any CVA tenderness but rather was tender over the lower lumbar paraspinal muscles.  Initially I was considering pyelonephritis but was also concerned about the lightheadedness that she was experiencing.  I considered sepsis and septic shock as possible causes.  However her vital signs were not consistent with this as she had a blood pressure of 179/89 pulse of 80.  An EKG was done to evaluate for other possible cardiac causes for the lightheadedness and it was not significantly changed from prior EKG from 2022.  I independently reviewed and interpreted the EKG.  I did send blood cultures because of the concern for sepsis initially.  Her history sounded suspicious for subclinical pyelonephritis but on her exam she did not have any CVA tenderness.  Her urine was positive for clumped WBCs with many bacteria and  positive nitrite.  The patient was being evaluated in the waiting room at the time however I did give her a dose of Rocephin to get her started on antibiotics and also offered some Toradol for pain but she refuses reporting her pain at 1 out of 10.  I switched her over to cefdinir as she was concerned that the Bactrim could be causing her symptoms.      Pertinent Labs & Imaging studies reviewed. (See chart for details)    The importance of close follow up was discussed. We reviewed warning signs and symptoms, and I instructed Ms. Bunn to return to the emergency department immediately if she develops any new or worsening symptoms. I provided additional verbal discharge instructions. Ms. Bunn expressed understanding and agreement with this plan of care, her questions were answered, and she was discharged in stable condition.     Medical Decision Making     History:    Supplemental history from: Documented in chart, if applicable    External Record(s) reviewed: Documented in chart, if applicable.     Work Up:    Chart documentation includes differential considered and any EKGs or imaging independently interpreted by provider, where specified.    In additional to work up documented, I considered the following work up: Documented in chart, if applicable.     External consultation:    Discussion of management with another provider: Documented in chart, if applicable     Complicating factors:    Care impacted by chronic illness: Hypertension, Chronic Kidney Disease, Diabetes    Care affected by social determinants of health: Access to Medical Care     Disposition considerations: Considering for admission       MEDICATIONS GIVEN IN THE EMERGENCY:  Medications   ketorolac (TORADOL) injection 15 mg (has no administration in time range)   cefTRIAXone (ROCEPHIN) 1 g vial to attach to  mL bag for ADULTS or NS 50 mL bag for PEDS (has no administration in time range)   0.9% sodium chloride BOLUS (1,000 mLs  Intravenous $New Bag 4/14/23 8396)       NEW PRESCRIPTIONS STARTED AT TODAY'S ER VISIT:  New Prescriptions    CEFDINIR (OMNICEF) 300 MG CAPSULE    Take 1 capsule (300 mg) by mouth 2 times daily for 7 days    ONDANSETRON (ZOFRAN ODT) 4 MG ODT TAB    Take 1 tablet (4 mg) by mouth every 6 hours as needed for nausea    OXYCODONE (ROXICODONE) 5 MG TABLET    Take 1 tablet (5 mg) by mouth every 4 hours as needed for pain          =================================================================    HPI    Patient information was obtained from: Patient     Use of : N/A         Ana Bunn is a 62 year old female with a past medical history of hypertension, CKD stage 3, and type II diabetes, who presents with a sense of near syncope.    Patient reports that she been experiencing dizziness, near-syncope, headache, and palpitations today after having started an antibiotic, sulfamethoxaz-ole-tmp DCS. She was recently diagnosed with a UTI yesterday with associated symptoms of urinary urgency, frequency, and dysuria. Patient states that she has also been experiencing chills, nausea, and left lower back pain. Patient denies fever, vomiting, abdominal pain, or additional symptoms or complaints at this time.       REVIEW OF SYSTEMS   Review of Systems   Constitutional: Positive for chills. Negative for fever.   Cardiovascular: Positive for palpitations.   Gastrointestinal: Positive for nausea. Negative for abdominal pain and vomiting.   Genitourinary: Positive for dysuria, frequency and urgency.   Neurological: Positive for dizziness and headaches.   All other systems reviewed and are negative.       PAST MEDICAL HISTORY:  History reviewed. No pertinent past medical history.    PAST SURGICAL HISTORY:  History reviewed. No pertinent surgical history.    CURRENT MEDICATIONS:      Current Facility-Administered Medications:      cefTRIAXone (ROCEPHIN) 1 g vial to attach to  mL bag for ADULTS or NS 50 mL bag for  "PEDS, 1 g, Intravenous, Once, Carter Toure MD     ketorolac (TORADOL) injection 15 mg, 15 mg, Intravenous, Once, Carter Toure MD    Current Outpatient Medications:      cefdinir (OMNICEF) 300 MG capsule, Take 1 capsule (300 mg) by mouth 2 times daily for 7 days, Disp: 14 capsule, Rfl: 0     ondansetron (ZOFRAN ODT) 4 MG ODT tab, Take 1 tablet (4 mg) by mouth every 6 hours as needed for nausea, Disp: 10 tablet, Rfl: 0     oxyCODONE (ROXICODONE) 5 MG tablet, Take 1 tablet (5 mg) by mouth every 4 hours as needed for pain, Disp: 12 tablet, Rfl: 0    ALLERGIES:  No Known Allergies    FAMILY HISTORY:  History reviewed. No pertinent family history.    SOCIAL HISTORY:   Social History     Socioeconomic History     Marital status:        PHYSICAL EXAM:    Vitals: BP (!) 180/87   Pulse 76   Temp 99.3  F (37.4  C) (Oral)   Resp 18   Ht 1.575 m (5' 2\")   Wt 72.6 kg (160 lb)   SpO2 100%   BMI 29.26 kg/m     Constitutional: Well developed, well nourished. Comfortable appearing.  HEAD:Normocephalic, atraumatic,   Eyes: PERRLA, EOM intact, conjunctiva clear, no discharge  ENT: mucous membranes moist, nose normal.   Neck- Supple, gross ROM intact.  No JVD.  No palpable nodes.  Pulmonary: Clear to auscultation bilaterally, no respiratory distress, no wheezing, speaks full sentences easily.  Chest: No chest wall tenderness  Cardiovascular: Normal heart rate, regular rhythm, no murmurs. No lower extremity edema, 2+ DP pulses.   GI: Soft, no tenderness to deep palpation in all quadrants, not distended, no masses.  No hepatosplenomegaly.  Musculoskeletal: Moving all 4 extremities intentionally and without pain. No obvious deformity.  Back: No CVA tenderness, lower lumbar tenderness  Skin: Warm, dry, no rash.  Neurologic: Alert & oriented x 3, speech clear, moving all extremities spontaneously   Psychiatric: Affect normal, cooperative.     LAB:  All pertinent labs reviewed and interpreted.  Labs Ordered and " Resulted from Time of ED Arrival to Time of ED Departure   BASIC METABOLIC PANEL - Abnormal       Result Value    Sodium 130 (*)     Potassium 4.2      Chloride 95 (*)     Carbon Dioxide (CO2) 26      Anion Gap 9      Urea Nitrogen 21.2      Creatinine 1.44 (*)     Calcium 9.3      Glucose 199 (*)     GFR Estimate 41 (*)    GLUCOSE BY METER - Abnormal    GLUCOSE BY METER POCT 201 (*)    ROUTINE UA WITH MICROSCOPIC REFLEX TO CULTURE - Abnormal    Color Urine Light Yellow      Appearance Urine Turbid (*)     Glucose Urine Negative      Bilirubin Urine Negative      Ketones Urine Negative      Specific Gravity Urine 1.008      Blood Urine Negative      pH Urine 6.5      Protein Albumin Urine 200 (*)     Urobilinogen Urine <2.0      Nitrite Urine Positive (*)     Leukocyte Esterase Urine 500 Ania/uL (*)     Bacteria Urine Many (*)     WBC Clumps Urine Present (*)     RBC Urine 0      WBC Urine 104 (*)     Squamous Epithelials Urine <1     TROPONIN T, HIGH SENSITIVITY - Normal    Troponin T, High Sensitivity 8     LACTIC ACID WHOLE BLOOD - Normal    Lactic Acid 0.9     CBC WITH PLATELETS AND DIFFERENTIAL    WBC Count 5.7      RBC Count 4.09      Hemoglobin 13.1      Hematocrit 38.2      MCV 93      MCH 32.0      MCHC 34.3      RDW 12.7      Platelet Count 208      % Neutrophils 68      % Lymphocytes 23      % Monocytes 6      % Eosinophils 2      % Basophils 0      % Immature Granulocytes 1      NRBCs per 100 WBC 0      Absolute Neutrophils 3.9      Absolute Lymphocytes 1.3      Absolute Monocytes 0.3      Absolute Eosinophils 0.1      Absolute Basophils 0.0      Absolute Immature Granulocytes 0.0      Absolute NRBCs 0.0     GLUCOSE MONITOR NURSING POCT   URINE CULTURE   BLOOD CULTURE   BLOOD CULTURE       RADIOLOGY:  No orders to display       EKG:   Performed at: 12:29  Impression: Sinus rhythm, Normal EKG  Rate: 84 BPM  Rhythm: Sinus  QRS Interval: 94 ms  QTc Interval: 456 ms  Comparison: When compared with EKG of  2/7/22, no significant change was found  I have independently reviewed and interpreted the EKG(s) documented above.     PROCEDURES:   Procedures       I, True Zamora, am serving as a scribe to document services personally performed by Dr. Carter Toure based on my observation and the provider's statements to me. I, Carter Toure M.D. attest that True Zamora is acting in a scribe capacity, has observed my performance of the services and has documented them in accordance with my direction.      Carter Toure M.D.  Emergency Medicine  [facility] EMERGENCY DEPARTMENT  [address] [place] [id number]  [phone]  Dept: [phone]     Carter Toure MD  04/22/23 0741

## 2023-04-14 NOTE — ED TRIAGE NOTES
Patient presents here with a sense of near syncope. She states that she was diagnosed with a UTI yesterday. She has been having symptoms of a UTI for a few days with urgency and frequency. She started sulfamethoxaz-ole-tmp DCS. Today. She notes that she feels worse after taking the antibiotic. She reports feeling dizzy, near syncopal and sense of palpitations.

## 2023-04-16 LAB
BACTERIA UR CULT: ABNORMAL
BACTERIA UR CULT: ABNORMAL

## 2023-04-19 LAB
BACTERIA BLD CULT: NO GROWTH
BACTERIA BLD CULT: NO GROWTH

## 2023-08-06 ENCOUNTER — HOSPITAL ENCOUNTER (EMERGENCY)
Facility: HOSPITAL | Age: 63
Discharge: HOME | End: 2023-08-06
Attending: EMERGENCY MEDICINE | Admitting: EMERGENCY MEDICINE
Payer: COMMERCIAL

## 2023-08-06 VITALS
WEIGHT: 161 LBS | BODY MASS INDEX: 28.53 KG/M2 | HEART RATE: 82 BPM | RESPIRATION RATE: 18 BRPM | DIASTOLIC BLOOD PRESSURE: 86 MMHG | HEIGHT: 63 IN | SYSTOLIC BLOOD PRESSURE: 181 MMHG | TEMPERATURE: 98.2 F | OXYGEN SATURATION: 100 %

## 2023-08-06 DIAGNOSIS — N39.0 URINARY TRACT INFECTION WITHOUT HEMATURIA, SITE UNSPECIFIED: ICD-10-CM

## 2023-08-06 PROBLEM — R26.9 GAIT ABNORMALITY: Status: ACTIVE | Noted: 2022-07-21

## 2023-08-06 PROBLEM — M25.551 RIGHT HIP PAIN: Status: ACTIVE | Noted: 2022-07-21

## 2023-08-06 PROBLEM — R26.89 IMBALANCE: Status: ACTIVE | Noted: 2022-05-17

## 2023-08-06 PROBLEM — S06.9XAA MILD TRAUMATIC BRAIN INJURY (H): Status: ACTIVE | Noted: 2022-06-13

## 2023-08-06 PROBLEM — M54.2 NECK PAIN: Status: ACTIVE | Noted: 2022-05-17

## 2023-08-06 PROBLEM — R42 DIZZINESS: Status: ACTIVE | Noted: 2022-05-17

## 2023-08-06 PROBLEM — Z87.42 HISTORY OF ABNORMAL CERVICAL PAP SMEAR: Status: ACTIVE | Noted: 2022-11-07

## 2023-08-06 PROBLEM — M85.80 OSTEOPENIA: Status: ACTIVE | Noted: 2022-07-18

## 2023-08-06 LAB
ALBUMIN UR-MCNC: 200 MG/DL
ANION GAP SERPL CALCULATED.3IONS-SCNC: 12 MMOL/L (ref 7–15)
APPEARANCE UR: CLEAR
BACTERIA #/AREA URNS HPF: ABNORMAL /HPF
BASOPHILS # BLD AUTO: 0 10E3/UL (ref 0–0.2)
BASOPHILS NFR BLD AUTO: 0 %
BILIRUB UR QL STRIP: NEGATIVE
BUN SERPL-MCNC: 22.6 MG/DL (ref 8–23)
CALCIUM SERPL-MCNC: 9.9 MG/DL (ref 8.8–10.2)
CHLORIDE SERPL-SCNC: 97 MMOL/L (ref 98–107)
COLOR UR AUTO: ABNORMAL
CREAT SERPL-MCNC: 1.57 MG/DL (ref 0.51–0.95)
DEPRECATED HCO3 PLAS-SCNC: 24 MMOL/L (ref 22–29)
EOSINOPHIL # BLD AUTO: 0.2 10E3/UL (ref 0–0.7)
EOSINOPHIL NFR BLD AUTO: 3 %
ERYTHROCYTE [DISTWIDTH] IN BLOOD BY AUTOMATED COUNT: 12.9 % (ref 10–15)
GFR SERPL CREATININE-BSD FRML MDRD: 37 ML/MIN/1.73M2
GLUCOSE SERPL-MCNC: 177 MG/DL (ref 70–99)
GLUCOSE UR STRIP-MCNC: NEGATIVE MG/DL
HCT VFR BLD AUTO: 40.8 % (ref 35–47)
HGB BLD-MCNC: 14.1 G/DL (ref 11.7–15.7)
HGB UR QL STRIP: NEGATIVE
IMM GRANULOCYTES # BLD: 0 10E3/UL
IMM GRANULOCYTES NFR BLD: 1 %
KETONES UR STRIP-MCNC: NEGATIVE MG/DL
LACTATE SERPL-SCNC: 0.9 MMOL/L (ref 0.7–2)
LEUKOCYTE ESTERASE UR QL STRIP: ABNORMAL
LYMPHOCYTES # BLD AUTO: 1.9 10E3/UL (ref 0.8–5.3)
LYMPHOCYTES NFR BLD AUTO: 31 %
MCH RBC QN AUTO: 32.2 PG (ref 26.5–33)
MCHC RBC AUTO-ENTMCNC: 34.6 G/DL (ref 31.5–36.5)
MCV RBC AUTO: 93 FL (ref 78–100)
MONOCYTES # BLD AUTO: 0.4 10E3/UL (ref 0–1.3)
MONOCYTES NFR BLD AUTO: 6 %
NEUTROPHILS # BLD AUTO: 3.7 10E3/UL (ref 1.6–8.3)
NEUTROPHILS NFR BLD AUTO: 59 %
NITRATE UR QL: NEGATIVE
NRBC # BLD AUTO: 0 10E3/UL
NRBC BLD AUTO-RTO: 0 /100
PH UR STRIP: 7 [PH] (ref 5–7)
PLATELET # BLD AUTO: 198 10E3/UL (ref 150–450)
POTASSIUM SERPL-SCNC: 4.1 MMOL/L (ref 3.4–5.3)
PROCALCITONIN SERPL IA-MCNC: 0.09 NG/ML
RBC # BLD AUTO: 4.38 10E6/UL (ref 3.8–5.2)
RBC URINE: 0 /HPF
SODIUM SERPL-SCNC: 133 MMOL/L (ref 136–145)
SP GR UR STRIP: 1.01 (ref 1–1.03)
SQUAMOUS EPITHELIAL: 1 /HPF
UROBILINOGEN UR STRIP-MCNC: <2 MG/DL
WBC # BLD AUTO: 6.1 10E3/UL (ref 4–11)
WBC URINE: 5 /HPF

## 2023-08-06 PROCEDURE — 96365 THER/PROPH/DIAG IV INF INIT: CPT

## 2023-08-06 PROCEDURE — 36415 COLL VENOUS BLD VENIPUNCTURE: CPT | Performed by: EMERGENCY MEDICINE

## 2023-08-06 PROCEDURE — 96361 HYDRATE IV INFUSION ADD-ON: CPT

## 2023-08-06 PROCEDURE — 99284 EMERGENCY DEPT VISIT MOD MDM: CPT | Mod: 25

## 2023-08-06 PROCEDURE — 258N000003 HC RX IP 258 OP 636: Performed by: EMERGENCY MEDICINE

## 2023-08-06 PROCEDURE — 81001 URINALYSIS AUTO W/SCOPE: CPT | Performed by: EMERGENCY MEDICINE

## 2023-08-06 PROCEDURE — 80048 BASIC METABOLIC PNL TOTAL CA: CPT | Performed by: EMERGENCY MEDICINE

## 2023-08-06 PROCEDURE — 85004 AUTOMATED DIFF WBC COUNT: CPT | Performed by: EMERGENCY MEDICINE

## 2023-08-06 PROCEDURE — 83605 ASSAY OF LACTIC ACID: CPT | Performed by: EMERGENCY MEDICINE

## 2023-08-06 PROCEDURE — 84145 PROCALCITONIN (PCT): CPT | Performed by: EMERGENCY MEDICINE

## 2023-08-06 PROCEDURE — 250N000011 HC RX IP 250 OP 636: Mod: JZ | Performed by: EMERGENCY MEDICINE

## 2023-08-06 RX ORDER — CEPHALEXIN 500 MG/1
500 CAPSULE ORAL 2 TIMES DAILY
Qty: 10 CAPSULE | Refills: 0 | Status: SHIPPED | OUTPATIENT
Start: 2023-08-06 | End: 2023-08-11

## 2023-08-06 RX ORDER — CEFTRIAXONE 1 G/1
1 INJECTION, POWDER, FOR SOLUTION INTRAMUSCULAR; INTRAVENOUS ONCE
Status: COMPLETED | OUTPATIENT
Start: 2023-08-06 | End: 2023-08-06

## 2023-08-06 RX ADMIN — CEFTRIAXONE SODIUM 1 G: 1 INJECTION, POWDER, FOR SOLUTION INTRAMUSCULAR; INTRAVENOUS at 16:13

## 2023-08-06 RX ADMIN — SODIUM CHLORIDE 500 ML: 9 INJECTION, SOLUTION INTRAVENOUS at 15:01

## 2023-08-06 ASSESSMENT — ACTIVITIES OF DAILY LIVING (ADL): ADLS_ACUITY_SCORE: 35

## 2023-08-06 NOTE — DISCHARGE INSTRUCTIONS
Please follow-up with your Primary Care Provider this upcoming week for a recheck; call to arrange appointment.    Return to the ER for worsening symptoms, if you develop abdominal or flank pain, persistent nausea or vomiting, inability to empty your bladder, fever or other concerns.    Complete the full course of antibiotics, even if you are feeling better.

## 2023-08-06 NOTE — ED TRIAGE NOTES
Pt has uti since Mon. Being treated with keflex x 2 day. States has had sepsis before and having weakness, tired, fevers and just not feeling well. Denies n/v     Triage Assessment       Row Name 08/06/23 3972       Triage Assessment (Adult)    Airway WDL WDL

## 2023-08-06 NOTE — ED PROVIDER NOTES
Emergency Department Encounter     Evaluation Date & Time:   2023  2:32 PM    CHIEF COMPLAINT:  Dysuria      Triage Note:Pt has uti since Mon. Being treated with keflex x 2 day. States has had sepsis before and having weakness, tired, fevers and just not feeling well. Denies n/v     Triage Assessment       Row Name 23 1430       Triage Assessment (Adult)    Airway WDL WDL                    Impression and Plan       FINAL IMPRESSION:    ICD-10-CM    1. Urinary tract infection without hematuria, site unspecified  N39.0           ED COURSE & MEDICAL DECISION MAKIN:47 PM I met with the patient to gather history and to perform my initial exam. I discussed the plan for care while in the Emergency Department  4:39 PM Checked in on and updated patient. I discussed the plan for discharge with the patient, and patient is agreeable.  We discussed supportive cares at home and reasons for return to the ER including new or worsening symptoms - all questions and concerns addressed.  Patient to be discharged by RN.       62 year old female, history of DM2, HTN, HLD, CKD3b, anemia and UTIs, who presents for evaluation of dysuria associated with fatigue, generalized weakness, malaise, poor appetite, chills and low back pain x 1 week. She was seen in clinic for these symptoms and diagnosed with a UTI for which she was started on Macrobid without improvement after 2 days. She called her clinic and they changed her antibiotics to Keflex, which she has been taking x 3 days without significant improvement still.     She denies abdominal pain, nausea / vomiting, fevers.     She is not toxic in appearance.  Abdomen is soft and non-tender to palpation with no CVAT, BL.    IV access established, blood sent for labs and IV fluids initiated.    On chart review, I was able to see her recent urine culture results; urine grew E. coli, that is pan-sensitive to all antibiotics except only intermediate sensitivity to  fluoroquinolones.    Patient given a dose of IV ceftriaxone.    Labs remarkable for no leukocytosis (WBC 6.1) with normal lactate (0.9) and minimally elevated procalcitonin (0.09). Vitals and labs not suggestive of sepsis.    Labs otherwise remarkable for stable and CKD (creatinine 1.57 with GFR 37) and no significant associated electrolyte derangements.    Patient was reassured and is comfortable with discharge to home.  She was advised to complete the full course of Keflex; her clinic had initially prescribed a 5-day course, which I extended for additional 5 days to cover for possible pyelonephritis.  Return precautions provided.  Patient stable throughout ED course.      At the conclusion of the encounter I discussed the results of all the tests and the disposition. The questions were answered. The patient and family acknowledged understanding and were agreeable with the care plan.      Medical Decision Making    History:    Supplemental history from: N/A    External Record(s) reviewed: Other: Prior ED record for UTI on 4/14/2023  and office visit for UTI on 8/01/2023 - Urine culture grew E. Coli, pan-sensitive except only intermediate sensitivity to fluoroquinolones     Work Up:    Chart documentation includes differential considered and any EKGs or imaging interpreted by provider.    In additional to work up documented, I considered the following work up: Documented in chart, if applicable.    External consultation:    Discussion of management with another provider: Documented in chart, if applicable    Complicating factors:    Care impacted by chronic illness: Chronic Kidney Disease and Diabetes    Care affected by social determinants of health: N/A    Disposition considerations: Discharge. I prescribed additional prescription strength medication(s) as charted. I considered admission, but ultimately discharged patient given reassuring evaluation and shared decision making conversation.        MEDICATIONS GIVEN  IN THE EMERGENCY DEPARTMENT:  Medications   0.9% sodium chloride BOLUS (0 mLs Intravenous Stopped 8/6/23 1600)   cefTRIAXone (ROCEPHIN) 1 g vial to attach to  mL bag for ADULTS or NS 50 mL bag for PEDS (0 g Intravenous Stopped 8/6/23 1643)       NEW PRESCRIPTIONS STARTED AT TODAY'S ED VISIT:  Discharge Medication List as of 8/6/2023  4:47 PM      START taking these medications    Details   cephALEXin (KEFLEX) 500 MG capsule Take 1 capsule (500 mg) by mouth 2 times daily for 5 days, Disp-10 capsule, R-0, Local Print             HPI     The history is provided by the patient. No  was used.        Ana Bunn is a 62 year old female, history of DM2, HTN, HLD, CKD3b, anemia and UTIs (with previous sepsis), who presents to this ED by walk-in for evaluation of dysuria.    Patient endorses dysuria with associated fatigue, generalized weakness, malaise, appetite loss, chills and low back pain starting on 7/30 (~one week ago). Patient took 2 tablets of keflex she had leftover when her symptoms started, but when that did not help, she went to clinic. She had a UA that was consistent with UTI and was prescribed Macrobid. After taking macrobid for 2 days, she still was not feeling improved and again called her clinic and they prescribed Keflex. She has been taking the Keflex since Thursday, still without improvement.     She denies abdominal pain, nausea / vomiting, fevers.     She has otherwise been in her usual state of health and denies chest pain, shortness of breath, URI symptoms or other concerns.    Patient endorses history of diabetes, high blood pressure, high cholesterol and kidney problems.     REVIEW OF SYSTEMS:  All other systems reviewed and are negative.      Medical History     History reviewed. No pertinent past medical history.    History reviewed. No pertinent surgical history.    History reviewed. No pertinent family history.         cephALEXin (KEFLEX) 500 MG  "capsule        Physical Exam     First Vitals:  Patient Vitals for the past 24 hrs:   BP Temp Temp src Pulse Resp SpO2 Height Weight   08/06/23 1656 (!) 181/86 -- -- -- -- -- -- --   08/06/23 1652 (!) 195/96 -- -- 82 18 100 % -- --   08/06/23 1429 (!) 182/100 98.2  F (36.8  C) Oral 90 18 99 % 1.6 m (5' 3\") 73 kg (161 lb)       PHYSICAL EXAM:   Physical Exam    GENERAL: Awake, alert.  In mild acute distress.   HEENT: Normocephalic, atraumatic.  NECK: No stridor.  PULMONARY: Symmetrical breath sounds without distress.  Lungs clear to auscultation bilaterally without wheezes, rhonchi or rales.  CARDIO: Regular rate and rhythm.  No significant murmur, rub or gallop.    ABDOMINAL: Abdomen soft, non-distended and non-tender to palpation.  No CVAT, BL.  EXTREMITIES: No lower extremity swelling or edema.      NEURO: Alert and oriented to person, place and time.  Cranial nerves grossly intact.  No focal motor deficit.  PSYCH: Normal mood and affect.      Results     LAB:  All pertinent labs reviewed and interpreted  Labs Ordered and Resulted from Time of ED Arrival to Time of ED Departure   ROUTINE UA WITH MICROSCOPIC REFLEX TO CULTURE - Abnormal       Result Value    Color Urine Light Yellow      Appearance Urine Clear      Glucose Urine Negative      Bilirubin Urine Negative      Ketones Urine Negative      Specific Gravity Urine 1.007      Blood Urine Negative      pH Urine 7.0      Protein Albumin Urine 200 (*)     Urobilinogen Urine <2.0      Nitrite Urine Negative      Leukocyte Esterase Urine 25 Ania/uL (*)     Bacteria Urine Few (*)     RBC Urine 0      WBC Urine 5      Squamous Epithelials Urine 1     BASIC METABOLIC PANEL - Abnormal    Sodium 133 (*)     Potassium 4.1      Chloride 97 (*)     Carbon Dioxide (CO2) 24      Anion Gap 12      Urea Nitrogen 22.6      Creatinine 1.57 (*)     Calcium 9.9      Glucose 177 (*)     GFR Estimate 37 (*)    PROCALCITONIN - Abnormal    Procalcitonin 0.09 (*)    LACTIC ACID WHOLE " BLOOD - Normal    Lactic Acid 0.9     CBC WITH PLATELETS AND DIFFERENTIAL    WBC Count 6.1      RBC Count 4.38      Hemoglobin 14.1      Hematocrit 40.8      MCV 93      MCH 32.2      MCHC 34.6      RDW 12.9      Platelet Count 198      % Neutrophils 59      % Lymphocytes 31      % Monocytes 6      % Eosinophils 3      % Basophils 0      % Immature Granulocytes 1      NRBCs per 100 WBC 0      Absolute Neutrophils 3.7      Absolute Lymphocytes 1.9      Absolute Monocytes 0.4      Absolute Eosinophils 0.2      Absolute Basophils 0.0      Absolute Immature Granulocytes 0.0      Absolute NRBCs 0.0         I, Samira Pratt , am serving as a scribe to document services personally performed by Katrin Sutherland MD based on my observation and the provider's statements to me. I, Katrin Sutherland MD attest that Samira Pratt is acting in a scribe capacity, has observed my performance of the services and has documented them in accordance with my direction.    Katrin Sutherland MD  Emergency Medicine  [facility] EMERGENCY DEPARTMENT           Katrin Sutherland MD  08/07/23 7273

## 2023-10-21 ENCOUNTER — APPOINTMENT (OUTPATIENT)
Dept: MRI IMAGING | Facility: HOSPITAL | Age: 63
DRG: 552 | End: 2023-10-21
Payer: COMMERCIAL

## 2023-10-21 ENCOUNTER — APPOINTMENT (OUTPATIENT)
Dept: CT IMAGING | Facility: HOSPITAL | Age: 63
DRG: 552 | End: 2023-10-21
Payer: COMMERCIAL

## 2023-10-21 ENCOUNTER — HOSPITAL ENCOUNTER (INPATIENT)
Facility: HOSPITAL | Age: 63
LOS: 6 days | Discharge: HOME HEALTH | DRG: 552 | End: 2023-10-27
Attending: EMERGENCY MEDICINE | Admitting: EMERGENCY MEDICINE
Payer: COMMERCIAL

## 2023-10-21 DIAGNOSIS — I10 ESSENTIAL HYPERTENSION: Primary | ICD-10-CM

## 2023-10-21 DIAGNOSIS — Z72.0 TOBACCO ABUSE: ICD-10-CM

## 2023-10-21 DIAGNOSIS — M46.56 SEPTIC ARTHRITIS OF LUMBAR SPINE (H): ICD-10-CM

## 2023-10-21 LAB
ANION GAP SERPL CALCULATED.3IONS-SCNC: 10 MMOL/L (ref 7–15)
BASO+EOS+MONOS # BLD AUTO: NORMAL 10*3/UL
BASO+EOS+MONOS NFR BLD AUTO: NORMAL %
BASOPHILS # BLD AUTO: 0 10E3/UL (ref 0–0.2)
BASOPHILS NFR BLD AUTO: 0 %
BUN SERPL-MCNC: 18.1 MG/DL (ref 8–23)
CALCIUM SERPL-MCNC: 9.4 MG/DL (ref 8.8–10.2)
CHLORIDE SERPL-SCNC: 99 MMOL/L (ref 98–107)
CREAT SERPL-MCNC: 1.63 MG/DL (ref 0.51–0.95)
CRP SERPL-MCNC: 62.5 MG/L
DEPRECATED HCO3 PLAS-SCNC: 25 MMOL/L (ref 22–29)
EGFRCR SERPLBLD CKD-EPI 2021: 35 ML/MIN/1.73M2
EOSINOPHIL # BLD AUTO: 0.1 10E3/UL (ref 0–0.7)
EOSINOPHIL NFR BLD AUTO: 1 %
ERYTHROCYTE [DISTWIDTH] IN BLOOD BY AUTOMATED COUNT: 12.6 % (ref 10–15)
GLUCOSE BLDC GLUCOMTR-MCNC: 208 MG/DL (ref 70–99)
GLUCOSE SERPL-MCNC: 170 MG/DL (ref 70–99)
HBA1C MFR BLD: 8.3 %
HCT VFR BLD AUTO: 37.4 % (ref 35–47)
HGB BLD-MCNC: 12.8 G/DL (ref 11.7–15.7)
IMM GRANULOCYTES # BLD: 0.1 10E3/UL
IMM GRANULOCYTES NFR BLD: 1 %
LYMPHOCYTES # BLD AUTO: 1.3 10E3/UL (ref 0.8–5.3)
LYMPHOCYTES NFR BLD AUTO: 14 %
MCH RBC QN AUTO: 31.8 PG (ref 26.5–33)
MCHC RBC AUTO-ENTMCNC: 34.2 G/DL (ref 31.5–36.5)
MCV RBC AUTO: 93 FL (ref 78–100)
MONOCYTES # BLD AUTO: 0.5 10E3/UL (ref 0–1.3)
MONOCYTES NFR BLD AUTO: 6 %
NEUTROPHILS # BLD AUTO: 7.2 10E3/UL (ref 1.6–8.3)
NEUTROPHILS NFR BLD AUTO: 78 %
NRBC # BLD AUTO: 0 10E3/UL
NRBC BLD AUTO-RTO: 0 /100
PLATELET # BLD AUTO: 184 10E3/UL (ref 150–450)
POTASSIUM SERPL-SCNC: 3.8 MMOL/L (ref 3.4–5.3)
RBC # BLD AUTO: 4.03 10E6/UL (ref 3.8–5.2)
SODIUM SERPL-SCNC: 134 MMOL/L (ref 135–145)
WBC # BLD AUTO: 9.1 10E3/UL (ref 4–11)

## 2023-10-21 PROCEDURE — 96375 TX/PRO/DX INJ NEW DRUG ADDON: CPT

## 2023-10-21 PROCEDURE — 36415 COLL VENOUS BLD VENIPUNCTURE: CPT | Performed by: PHYSICIAN ASSISTANT

## 2023-10-21 PROCEDURE — 250N000012 HC RX MED GY IP 250 OP 636 PS 637: Performed by: EMERGENCY MEDICINE

## 2023-10-21 PROCEDURE — 96374 THER/PROPH/DIAG INJ IV PUSH: CPT | Mod: 59

## 2023-10-21 PROCEDURE — 250N000013 HC RX MED GY IP 250 OP 250 PS 637: Performed by: EMERGENCY MEDICINE

## 2023-10-21 PROCEDURE — 87040 BLOOD CULTURE FOR BACTERIA: CPT | Performed by: PHYSICIAN ASSISTANT

## 2023-10-21 PROCEDURE — A9585 GADOBUTROL INJECTION: HCPCS | Mod: JZ

## 2023-10-21 PROCEDURE — 86140 C-REACTIVE PROTEIN: CPT | Performed by: PHYSICIAN ASSISTANT

## 2023-10-21 PROCEDURE — 250N000013 HC RX MED GY IP 250 OP 250 PS 637: Performed by: PHYSICIAN ASSISTANT

## 2023-10-21 PROCEDURE — 85025 COMPLETE CBC W/AUTO DIFF WBC: CPT | Performed by: PHYSICIAN ASSISTANT

## 2023-10-21 PROCEDURE — 250N000011 HC RX IP 250 OP 636: Performed by: EMERGENCY MEDICINE

## 2023-10-21 PROCEDURE — 99285 EMERGENCY DEPT VISIT HI MDM: CPT | Mod: 25

## 2023-10-21 PROCEDURE — 80048 BASIC METABOLIC PNL TOTAL CA: CPT | Performed by: PHYSICIAN ASSISTANT

## 2023-10-21 PROCEDURE — 83036 HEMOGLOBIN GLYCOSYLATED A1C: CPT | Performed by: EMERGENCY MEDICINE

## 2023-10-21 PROCEDURE — 99223 1ST HOSP IP/OBS HIGH 75: CPT | Performed by: EMERGENCY MEDICINE

## 2023-10-21 PROCEDURE — 120N000001 HC R&B MED SURG/OB

## 2023-10-21 PROCEDURE — 255N000002 HC RX 255 OP 636: Mod: JZ

## 2023-10-21 PROCEDURE — 72131 CT LUMBAR SPINE W/O DYE: CPT

## 2023-10-21 PROCEDURE — 72158 MRI LUMBAR SPINE W/O & W/DYE: CPT

## 2023-10-21 PROCEDURE — 250N000011 HC RX IP 250 OP 636: Mod: JZ | Performed by: PHYSICIAN ASSISTANT

## 2023-10-21 RX ORDER — INSULIN GLARGINE 100 [IU]/ML
12-13 INJECTION, SOLUTION SUBCUTANEOUS EVERY EVENING
COMMUNITY

## 2023-10-21 RX ORDER — NALOXONE HYDROCHLORIDE 0.4 MG/ML
0.2 INJECTION, SOLUTION INTRAMUSCULAR; INTRAVENOUS; SUBCUTANEOUS
Status: DISCONTINUED | OUTPATIENT
Start: 2023-10-21 | End: 2023-10-27 | Stop reason: HOSPADM

## 2023-10-21 RX ORDER — MULTIVITAMIN,THERAPEUTIC
1 TABLET ORAL DAILY
Status: DISCONTINUED | OUTPATIENT
Start: 2023-10-22 | End: 2023-10-27 | Stop reason: HOSPADM

## 2023-10-21 RX ORDER — LANOLIN ALCOHOL/MO/W.PET/CERES
100 CREAM (GRAM) TOPICAL DAILY
COMMUNITY

## 2023-10-21 RX ORDER — SODIUM CHLORIDE AND POTASSIUM CHLORIDE 150; 900 MG/100ML; MG/100ML
INJECTION, SOLUTION INTRAVENOUS CONTINUOUS
Status: DISCONTINUED | OUTPATIENT
Start: 2023-10-21 | End: 2023-10-23

## 2023-10-21 RX ORDER — VITAMIN B COMPLEX
1 TABLET ORAL DAILY
COMMUNITY

## 2023-10-21 RX ORDER — MORPHINE SULFATE 4 MG/ML
4 INJECTION, SOLUTION INTRAMUSCULAR; INTRAVENOUS ONCE
Status: COMPLETED | OUTPATIENT
Start: 2023-10-21 | End: 2023-10-21

## 2023-10-21 RX ORDER — ACETAMINOPHEN 325 MG/1
975 TABLET ORAL ONCE
Status: COMPLETED | OUTPATIENT
Start: 2023-10-21 | End: 2023-10-21

## 2023-10-21 RX ORDER — VITAMIN B COMPLEX
25 TABLET ORAL DAILY
Status: DISCONTINUED | OUTPATIENT
Start: 2023-10-22 | End: 2023-10-27 | Stop reason: HOSPADM

## 2023-10-21 RX ORDER — DEXTROSE MONOHYDRATE 25 G/50ML
25-50 INJECTION, SOLUTION INTRAVENOUS
Status: DISCONTINUED | OUTPATIENT
Start: 2023-10-21 | End: 2023-10-27 | Stop reason: HOSPADM

## 2023-10-21 RX ORDER — CEPHALEXIN 500 MG/1
500 CAPSULE ORAL 2 TIMES DAILY
Status: ON HOLD | COMMUNITY
Start: 2023-10-19 | End: 2023-10-27

## 2023-10-21 RX ORDER — MULTIVITAMIN,THERAPEUTIC
1 TABLET ORAL DAILY
COMMUNITY

## 2023-10-21 RX ORDER — CEFAZOLIN SODIUM 1 G/50ML
1250 SOLUTION INTRAVENOUS ONCE
Status: DISCONTINUED | OUTPATIENT
Start: 2023-10-21 | End: 2023-10-21

## 2023-10-21 RX ORDER — NALOXONE HYDROCHLORIDE 0.4 MG/ML
0.4 INJECTION, SOLUTION INTRAMUSCULAR; INTRAVENOUS; SUBCUTANEOUS
Status: DISCONTINUED | OUTPATIENT
Start: 2023-10-21 | End: 2023-10-27 | Stop reason: HOSPADM

## 2023-10-21 RX ORDER — ONDANSETRON 2 MG/ML
4 INJECTION INTRAMUSCULAR; INTRAVENOUS EVERY 6 HOURS PRN
Status: DISCONTINUED | OUTPATIENT
Start: 2023-10-21 | End: 2023-10-27 | Stop reason: HOSPADM

## 2023-10-21 RX ORDER — NICOTINE POLACRILEX 4 MG
15-30 LOZENGE BUCCAL
Status: DISCONTINUED | OUTPATIENT
Start: 2023-10-21 | End: 2023-10-27 | Stop reason: HOSPADM

## 2023-10-21 RX ORDER — MORPHINE SULFATE 4 MG/ML
4 INJECTION, SOLUTION INTRAMUSCULAR; INTRAVENOUS
Status: DISCONTINUED | OUTPATIENT
Start: 2023-10-21 | End: 2023-10-24

## 2023-10-21 RX ORDER — INSULIN GLARGINE 100 [IU]/ML
12-13 INJECTION, SOLUTION SUBCUTANEOUS EVERY EVENING
Status: DISCONTINUED | OUTPATIENT
Start: 2023-10-21 | End: 2023-10-21

## 2023-10-21 RX ORDER — KETOROLAC TROMETHAMINE 15 MG/ML
15 INJECTION, SOLUTION INTRAMUSCULAR; INTRAVENOUS ONCE
Status: COMPLETED | OUTPATIENT
Start: 2023-10-21 | End: 2023-10-21

## 2023-10-21 RX ORDER — ACETAMINOPHEN 325 MG/1
975 TABLET ORAL EVERY 6 HOURS PRN
Status: DISCONTINUED | OUTPATIENT
Start: 2023-10-21 | End: 2023-10-27 | Stop reason: HOSPADM

## 2023-10-21 RX ORDER — GADOBUTROL 604.72 MG/ML
7 INJECTION INTRAVENOUS ONCE
Status: COMPLETED | OUTPATIENT
Start: 2023-10-21 | End: 2023-10-21

## 2023-10-21 RX ORDER — CEFTRIAXONE 1 G/1
1 INJECTION, POWDER, FOR SOLUTION INTRAMUSCULAR; INTRAVENOUS ONCE
Qty: 10 ML | Refills: 0 | Status: DISCONTINUED | OUTPATIENT
Start: 2023-10-21 | End: 2023-10-21

## 2023-10-21 RX ORDER — ONDANSETRON 4 MG/1
4 TABLET, ORALLY DISINTEGRATING ORAL EVERY 6 HOURS PRN
Status: DISCONTINUED | OUTPATIENT
Start: 2023-10-21 | End: 2023-10-27 | Stop reason: HOSPADM

## 2023-10-21 RX ORDER — AMPICILLIN TRIHYDRATE 250 MG
1 CAPSULE ORAL DAILY
COMMUNITY

## 2023-10-21 RX ADMIN — GADOBUTROL 7 ML: 604.72 INJECTION INTRAVENOUS at 15:35

## 2023-10-21 RX ADMIN — ACETAMINOPHEN 975 MG: 325 TABLET ORAL at 12:41

## 2023-10-21 RX ADMIN — ACETAMINOPHEN 975 MG: 325 TABLET ORAL at 21:04

## 2023-10-21 RX ADMIN — MORPHINE SULFATE 4 MG: 4 INJECTION, SOLUTION INTRAMUSCULAR; INTRAVENOUS at 13:45

## 2023-10-21 RX ADMIN — KETOROLAC TROMETHAMINE 15 MG: 15 INJECTION, SOLUTION INTRAMUSCULAR; INTRAVENOUS at 12:49

## 2023-10-21 RX ADMIN — MORPHINE SULFATE 4 MG: 4 INJECTION, SOLUTION INTRAMUSCULAR; INTRAVENOUS at 22:33

## 2023-10-21 RX ADMIN — INSULIN GLARGINE 6 UNITS: 100 INJECTION, SOLUTION SUBCUTANEOUS at 23:17

## 2023-10-21 ASSESSMENT — ACTIVITIES OF DAILY LIVING (ADL)
ADLS_ACUITY_SCORE: 35
ADLS_ACUITY_SCORE: 37
ADLS_ACUITY_SCORE: 35
ADLS_ACUITY_SCORE: 37

## 2023-10-21 NOTE — ED PROVIDER NOTES
"EMERGENCY DEPARTMENT NOTE     Name: Ana Bunn    Age/Sex: 62 year old female   MRN: [id number]   Evaluation Date & Time:  10/21/2023 11:58 AM    PCP:    No Ref-Primary, Physician   ED Provider: Jay Ho D.O.       CHIEF COMPLAINT    Back Pain       DIAGNOSIS & DISPOSITION/MEDICAL DECISION MAKING     1. Septic arthritis of lumbar spine (H24)        Ana Bunn is a 62 year old female with relevant past history of hypertension, dyslipidemia, diabetes, stage 3 chronic kidney disease, and UTIs who presents to the emergency department for evaluation of back pain    Differential diagnosis considered included but not limited to discitis, spinal epidural abscess, malignancy, fracture    Medical Decision Making  CT lumbar spine negative for acute process including fracture or evidence of mass  MRI demonstrated enhancement at L3/L4, L4/L5 levels consistent with osteomyelitis and there was early phlegmon or formation in the epidural canal.  WBC 9, CRP 62, glucose 170  On exam is afebrile, stable vital signs and was neurologically intact with normal strength with dorsiflexion, plantarflexion and flexion at the hip on exam.  Initial plan was for IR biopsy prior to starting antibiotics.  The patient ate at approximately 1630 hrs. precluding procedure tonight.  Discussed case with Dr. Mayo for infectious disease and will currently not start antibiotics pending biopsy in a.m.  Case was discussed with the hospitalist service and neurosurgery by ANAYA Gonzales  Current findings and plan for admission discussed with the patient and her family and they are in agreement.  Patient will be admitted for further evaluation with neurosurgery and infectious disease consultation in a.m.      Interventions:None  Discharge Vital Signs:BP (!) 142/77 (BP Location: Left arm)   Pulse 89   Temp 98.6  F (37  C) (Oral)   Resp 18   Ht 1.575 m (5' 2\")   Wt 73.5 kg (162 lb)   SpO2 94%   BMI 29.63 kg/m       DISPOSITION: " Admit to [facility] [place]    Diagnostic studies:  Imaging:  MR Lumbar Spine w/o & w Contrast   Final Result   Addendum (preliminary) 1 of 1   Addendum/   impression:      Dr. Cao discussed the results with Dr. Houser on 10/21/2023 4:15 PM    CDT by telephone.      Final   IMPRESSION:   1.  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  Careful attention to the numbering convention is recommended prior to any future percutaneous or surgical intervention.    2.  Edema, and enhancement is demonstrated centered on the L3/L4 and L4/L5 facet joints which extend into the dorsal aspect of the spinal canal with solid enhancement. Overall constellation of findings are consistent with septic arthritis at L3/L4 and    L4/L5 with extension into the dorsal epidural space with early dorsal epidural phlegmon at L3/L4 and L4/L5. Moderate spinal canal narrowing. Moderate spinal canal narrowing at the L3/L4 and L4/L5 levels.   3.  Edema within the left paraspinal musculature at L3/L4 and L4/L5, consistent with myositis.       Lumbar spine CT w/o contrast   Final Result   IMPRESSION:   1.  Transitional L5 vertebral body.   2.  No fracture or posttraumatic subluxation.   3.  Moderate left L4-L5 lateral recess and neural foraminal stenoses.         Lab:  Labs Ordered and Resulted from Time of ED Arrival to Time of ED Departure   BASIC METABOLIC PANEL - Abnormal       Result Value    Sodium 134 (*)     Potassium 3.8      Chloride 99      Carbon Dioxide (CO2) 25      Anion Gap 10      Urea Nitrogen 18.1      Creatinine 1.63 (*)     GFR Estimate 35 (*)     Calcium 9.4      Glucose 170 (*)    CRP INFLAMMATION - Abnormal    CRP Inflammation 62.50 (*)    CBC WITH PLATELETS AND DIFFERENTIAL    WBC Count 9.1      RBC Count 4.03      Hemoglobin 12.8      Hematocrit 37.4      MCV 93      MCH 31.8      MCHC 34.2      RDW 12.6      Platelet Count 184      % Neutrophils 78      % Lymphocytes 14      % Monocytes  6      Mids % (Monos, Eos, Basos)        % Eosinophils 1      % Basophils 0      % Immature Granulocytes 1      NRBCs per 100 WBC 0      Absolute Neutrophils 7.2      Absolute Lymphocytes 1.3      Absolute Monocytes 0.5      Mids Abs (Monos, Eos, Basos)        Absolute Eosinophils 0.1      Absolute Basophils 0.0      Absolute Immature Granulocytes 0.1      Absolute NRBCs 0.0     BLOOD CULTURE   BLOOD CULTURE          ED Course as of 10/21/23 2053   Sat Oct 21, 2023   1332 Lab work returned.  She has a CRP of 62.5.  CKD noted, creatinine at baseline.  On reevaluation still significantly tender.  We will give her some opioids and given the point tenderness and CRP in the setting of diabetes, I do think more imaging with an MRI is appropriate at this time.   1643 Spoke with neurosurgery.  Strongly recommends talking with ID before initiating antibiotics.  I have updated the nurse with this and placed a call for infectious disease.  They will likely consult on the patient tomorrow.  No emergent surgical intervention indicated at this time   1651 Spoke with Dr. Rodrigez hospitalist.  Accepts the patient.  Still waiting on infectious disease   1704 Spoke with infectious disease, Dr. cruz.  They would recommend a tissue biopsy by IR with obvious bacterial cultures as well as fungal and repeating a CRP and ESR in the morning.   1720 Patient care signed out to Dr. Ho pending IR consultation.  Patient has been updated with this plan and nursing is holding antibiotics until they are instructed to get them.       Triage note reviewed:Pt presents to triage with c/o lower back pain 10/10 that started this morning. Denies numbness/tingling/weakness down either leg. Denies any trauma or falls.  Pt reports left hip pain on Thursday- pt took bengay and heat pad with no relief. Pt is on abx for urinary infection. Denies any pain on urination.   HX: diabetes, UTI's     Triage Assessment (Adult)       Row Name 10/21/23 1151           Triage Assessment    Airway WDL WDL        Respiratory WDL    Respiratory WDL WDL        Skin Circulation/Temperature WDL    Skin Circulation/Temperature WDL WDL        Cardiac WDL    Cardiac WDL X;rhythm     Pulse Rate & Regularity tachycardic        Peripheral/Neurovascular WDL    Peripheral Neurovascular WDL WDL        Cognitive/Neuro/Behavioral WDL    Cognitive/Neuro/Behavioral WDL WDL                         History:  Supplemental history from:Family  External Record(s) reviewed: Patient primary care clinic visit October 19, 2023    Work Up:  Chart documentation includes differential considered and any EKGs or imaging independently interpreted by provider, where specified.  In additional to work up documented, I considered the following work up: NA    External consultation:  Discussion of management with another provider: Factious disease, hospitalist and neurosurgery    Complicating factors:  Care impacted by chronic illness: Chronic Kidney Disease, Diabetes, and Hypertension  Care affected by social determinants of health: N/A    Disposition considerations: Admit.    At the conclusion of the encounter I discussed the results of all of the tests and the disposition. The questions were answered. The patient or family acknowledged understanding and was agreeable with the care plan.    TOTAL CRITICAL CARE TIME (EXCLUDING PROCEDURES): Not applicable    PROCEDURES:   None    EMERGENCY DEPARTMENT COURSE   4:21 Patient staffed with me by ANAYA Rdz  5:05 PM I met with the patient to gather history and to perform my initial exam.  We discussed treatment options and the plan for care while in the Emergency Department.  6:25 PM Plan was for biopsy of the phlegmon area, I spoke to Dr. Kearney from IR, patient last ate at 4:30pm. He will defer procedure until morning. I recall Dr. Mayo from ID we will continue holding antibiotics until after biopsy is completed.   8:44 PM Spoke to hospitalist.     ED  INTERVENTIONS     Medications   morphine (PF) injection 4 mg (has no administration in time range)   acetaminophen (TYLENOL) tablet 975 mg (has no administration in time range)   naloxone (NARCAN) injection 0.2 mg (has no administration in time range)     Or   naloxone (NARCAN) injection 0.4 mg (has no administration in time range)     Or   naloxone (NARCAN) injection 0.2 mg (has no administration in time range)     Or   naloxone (NARCAN) injection 0.4 mg (has no administration in time range)   ketorolac (TORADOL) injection 15 mg (15 mg Intravenous $Given 10/21/23 1249)   acetaminophen (TYLENOL) tablet 975 mg (975 mg Oral $Given 10/21/23 1241)   morphine (PF) injection 4 mg (4 mg Intravenous $Given 10/21/23 1345)   gadobutrol (GADAVIST) injection 7 mL (7 mLs Intravenous $Given 10/21/23 1535)       DISCHARGE MEDICATIONS        Review of your medicines        UNREVIEWED medicines. Ask your doctor about these medicines        Dose / Directions   cephALEXin 500 MG capsule  Commonly known as: KEFLEX      Dose: 500 mg  Take 500 mg by mouth 2 times daily  Refills: 0     insulin glargine 100 UNIT/ML pen      Dose: 12-13 Units  Inject 12-13 Units Subcutaneous every evening  Refills: 0     multivitamin, therapeutic Tabs tablet      Dose: 1 tablet  Take 1 tablet by mouth daily  Refills: 0     thiamine 100 MG tablet  Commonly known as: B-1      Dose: 100 mg  Take 100 mg by mouth daily  Refills: 0     Vitamin D3 25 mcg (1000 units) tablet  Commonly known as: CHOLECALCIFEROL      Dose: 1 tablet  Take 1 tablet by mouth daily  Refills: 0            CONTINUE these medicines which have NOT CHANGED        Dose / Directions   cinnamon 500 MG Caps      Dose: 1 capsule  Take 1 capsule by mouth daily  Refills: 0     garlic 150 MG Tabs tablet      Dose: 150 mg  Take 150 mg by mouth daily  Refills: 0                INFORMATION SOURCE AND LIMITATIONS    History/Exam limitations: None  Patient information was obtained from: Patient  Use of  : No    HISTORY OF PRESENT ILLNESS   Ana Bunn is a 62 year old year old female with a relevant past history of diabetes and UTIs who presents to this ED by walk in for evaluation of lower back pain.    The patient's symptoms started with some pain in her left hip 2 days ago. She put a heating pad on and symptoms seemed to improve. Yesterday she was doing well, but this morning she developed severe low back pain. The patient denies numbness, tingling or weakness in her extremities.  She has had some dysuria recently and is currently on an antibiotic -Keflex. She does not use any oral pain medication       REVIEW OF SYSTEMS:   All other systems reviewed and are negative except as noted above in HPI.    PATIENT HISTORY   History reviewed. No pertinent past medical history.  Patient Active Problem List   Diagnosis    Age-related nuclear cataract of both eyes    Chest pain    Anemia    Closed fracture of lower end of left radius with routine healing    E. coli UTI    High triglycerides    HTN (hypertension)    Microalbuminuria    Stage 3b chronic kidney disease (H)    Type 2 diabetes mellitus with microalbuminuria, with long-term current use of insulin (H)    Vitamin D insufficiency    Dizziness    Dyslipidemia    Esophageal reflux    Gait abnormality    History of abnormal cervical Pap smear    Imbalance    Mild traumatic brain injury (H)    Neck pain    Osteopenia    Right hip pain    Septic arthritis of lumbar spine (H24)     History reviewed. No pertinent surgical history.    No Known Allergies    OUTPATIENT MEDICATIONS     Current Discharge Medication List         Vitals:    10/21/23 1412 10/21/23 1430 10/21/23 1442 10/21/23 1842   BP: (!) 163/83 (!) 157/86 (!) 148/82 (!) 184/92   BP Location:    Left arm   Cuff Size:    Adult Regular   Pulse: 88 85 84 84   Resp:  18  16   Temp:    98  F (36.7  C)   TempSrc:    Oral   SpO2: 97% 97% 97% 99%   Weight:       Height:           Physical Exam    Constitutional: Oriented to person, place, and time. Appears well-developed and well-nourished.   HEENT:    Head: Atraumatic.   Neck: Normal range of motion. Neck supple.   Cardiovascular: Normal rate, regular rhythm and normal heart sounds.    Pulmonary/Chest: Normal effort  and breath sounds normal.   Abdominal: Soft. Bowel sounds are normal.   Musculoskeletal: Numbness at the L4-L5 area of the lumbar spine  Neurological: Alert and oriented to person, place, and time.  5 motor strength of the lower extremities with dorsiflexion, plantarflexion and flexion at the hips. No sensory deficit.   Skin: Skin is warm and dry.   Psychiatric: Normal mood and affect. Behavior is normal. Thought content normal.     DIAGNOSTICS    LABORATORY FINDINGS (REVIEWED AND INTERPRETED):  Labs Ordered and Resulted from Time of ED Arrival to Time of ED Departure   BASIC METABOLIC PANEL - Abnormal       Result Value    Sodium 134 (*)     Potassium 3.8      Chloride 99      Carbon Dioxide (CO2) 25      Anion Gap 10      Urea Nitrogen 18.1      Creatinine 1.63 (*)     GFR Estimate 35 (*)     Calcium 9.4      Glucose 170 (*)    CRP INFLAMMATION - Abnormal    CRP Inflammation 62.50 (*)    CBC WITH PLATELETS AND DIFFERENTIAL    WBC Count 9.1      RBC Count 4.03      Hemoglobin 12.8      Hematocrit 37.4      MCV 93      MCH 31.8      MCHC 34.2      RDW 12.6      Platelet Count 184      % Neutrophils 78      % Lymphocytes 14      % Monocytes 6      Mids % (Monos, Eos, Basos)        % Eosinophils 1      % Basophils 0      % Immature Granulocytes 1      NRBCs per 100 WBC 0      Absolute Neutrophils 7.2      Absolute Lymphocytes 1.3      Absolute Monocytes 0.5      Mids Abs (Monos, Eos, Basos)        Absolute Eosinophils 0.1      Absolute Basophils 0.0      Absolute Immature Granulocytes 0.1      Absolute NRBCs 0.0     BLOOD CULTURE   BLOOD CULTURE         IMAGING (REVIEWED AND INTERPRETED):  MR Lumbar Spine w/o & w Contrast   Final Result    Addendum (preliminary) 1 of 1   Addendum/   impression:      Dr. Cao discussed the results with Dr. Houser on 10/21/2023 4:15 PM    CDT by telephone.      Final   IMPRESSION:   1.  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  Careful attention to the numbering convention is recommended prior to any future percutaneous or surgical intervention.    2.  Edema, and enhancement is demonstrated centered on the L3/L4 and L4/L5 facet joints which extend into the dorsal aspect of the spinal canal with solid enhancement. Overall constellation of findings are consistent with septic arthritis at L3/L4 and    L4/L5 with extension into the dorsal epidural space with early dorsal epidural phlegmon at L3/L4 and L4/L5. Moderate spinal canal narrowing. Moderate spinal canal narrowing at the L3/L4 and L4/L5 levels.   3.  Edema within the left paraspinal musculature at L3/L4 and L4/L5, consistent with myositis.       Lumbar spine CT w/o contrast   Final Result   IMPRESSION:   1.  Transitional L5 vertebral body.   2.  No fracture or posttraumatic subluxation.   3.  Moderate left L4-L5 lateral recess and neural foraminal stenoses.            I, Clara Fox, am serving as a scribe to document services personally performed by Jay Ho D.O., based on my observation and the provider s statements to me.    I, Jay Ho D.O., attest that Clara Fox is acting in a scribe capacity, has observed my performance of the services and has documented them in accordance with my direction.    Jay Ho D.O.  EMERGENCY MEDICINE   10/21/23  [facility] EMERGENCY DEPARTMENT  [address] [place] [id number]  [phone]  Dept: [phone]     Jay Ho DO  10/22/23 0301

## 2023-10-21 NOTE — ED PROVIDER NOTES
ED PROVIDER NOTE    EMERGENCY DEPARTMENT ENCOUNTER      NAME: Ana Bunn  AGE: 62 year old female  YOB: 1960  MRN: [id number]  EVALUATION DATE & TIME: 10/21/2023 11:58 AM    PCP: No Ref-Primary, Physician    ED PROVIDER: Madelin Rdz PA-C      Chief Complaint   Patient presents with    Back Pain         FINAL IMPRESSION:  No diagnosis found.      MEDICAL DECISION MAKING:    Pertinent Labs & Imaging studies reviewed. (See chart for details)  62 year old female with a h/o diabetes presents to the Emergency Department for evaluation of acute, nontraumatic low back pain.  No fevers, no radiation of pain.    Hypertensive here, likely in the setting of pain.  Neuro intact.  Without any trauma I have low suspicion for acute fracture.  She does not have any classic radicular symptoms to suggest herniated disc.  In the setting of her diabetes and point tenderness infectious etiology was on my differential as well.    Initiated work-up with labs including a CRP as well as a CT of the lumbar spine.    Lab work revealed a CRP of 62.5 but normal white count.  This raise my concern for infection.  CT scan was unremarkable therefore proceeded with an MRI of the lumbar spine which did show findings of an L3-L4 septic arthritis.  There may be an early phlegmon but no abscess amenable to drainage.    Consulted with neurosurgery and ultimately infectious disease who recommended IR consultation and sample with fungal cultures included. Repeat ESR and CRP tomorrow.  Patient will be admitted to the hospitalist for further management.    At the conclusion of the encounter I discussed the results of all of the tests and the disposition. The questions were answered. The patient or family acknowledged understanding and was agreeable with the care plan.         Medical Decision Making    History:  Supplemental history from: Documented in chart, if applicable and Family Member/Significant Other  External Record(s)  reviewed: Documented in chart, if applicable.    Work Up:  Chart documentation includes differential considered and any EKGs or imaging independently interpreted by provider, where specified.  In additional to work up documented, I considered the following work up: Documented in chart, if applicable.    External consultation:  Discussion of management with another provider: Documented in chart, if applicable    Complicating factors:  Care impacted by chronic illness: Diabetes  Care affected by social determinants of health: N/A    Disposition considerations: Admit.          ED COURSE    ED Course as of 10/21/23 1725   Sat Oct 21, 2023   1332 Lab work returned.  She has a CRP of 62.5.  CKD noted, creatinine at baseline.  On reevaluation still significantly tender.  We will give her some opioids and given the point tenderness and CRP in the setting of diabetes, I do think more imaging with an MRI is appropriate at this time.   1643 Spoke with neurosurgery.  Strongly recommends talking with ID before initiating antibiotics.  I have updated the nurse with this and placed a call for infectious disease.  They will likely consult on the patient tomorrow.  No emergent surgical intervention indicated at this time   1651 Spoke with Dr. Rodrigez hospitalist.  Accepts the patient.  Still waiting on infectious disease   1704 Spoke with infectious disease, Dr. cruz.  They would recommend a tissue biopsy by IR with obvious bacterial cultures as well as fungal and repeating a CRP and ESR in the morning.   1720 Patient care signed out to Dr. Ho pending IR consultation.  Patient has been updated with this plan and nursing is holding antibiotics until they are instructed to get them.         MEDICATIONS GIVEN IN THE EMERGENCY:  Medications   ketorolac (TORADOL) injection 15 mg (15 mg Intravenous $Given 10/21/23 1249)   acetaminophen (TYLENOL) tablet 975 mg (975 mg Oral $Given 10/21/23 1241)   morphine (PF) injection 4 mg (4 mg  Intravenous $Given 10/21/23 1345)   gadobutrol (GADAVIST) injection 7 mL (7 mLs Intravenous $Given 10/21/23 1535)       NEW PRESCRIPTIONS STARTED AT TODAY'S ER VISIT  New Prescriptions    No medications on file          =================================================================    HPI    Patient information was obtained from: Patient    Ana Bunn is a 62 year old female with a history of diabetes and UTIs who presents with low back pain.  Symptoms actually started with some pain in her left hip 2 days ago.  She put a heating pad on and symptoms seem to improve.  Yesterday she seemed to do well but then this morning developed quite severe low back pain.  No radiation of the pain.  No numbness, tingling or weakness in her extremities.  She has had some dysuria recently and is currently on an antibiotic -Keflex.  Has not used any oral pain medication      REVIEW OF SYSTEMS   See HPI, otherwise all other systems reviewed and are negative    PAST MEDICAL HISTORY:  History reviewed. No pertinent past medical history.    PAST SURGICAL HISTORY:  History reviewed. No pertinent surgical history.        CURRENT MEDICATIONS:    No current facility-administered medications for this encounter.  No current outpatient medications on file.    ALLERGIES:  No Known Allergies    FAMILY HISTORY:  History reviewed. No pertinent family history.      VITALS:  Vitals:    10/21/23 1357 10/21/23 1412 10/21/23 1430 10/21/23 1442   BP: (!) 161/78 (!) 163/83 (!) 157/86 (!) 148/82   Pulse: 90 88 85 84   Resp:   18    Temp:       TempSrc:       SpO2: 97% 97% 97% 97%   Weight:       Height:           PHYSICAL EXAM    General Appearance:  Alert, cooperative, no distress, appears stated age  HENT: Normocephalic without obvious deformity, atraumatic. Mucous membranes moist   Eyes: Conjunctiva clear, Lids normal. No discharge.   Respiratory: No distress. Lungs clear to ausculation bilaterally. No wheezes, rhonchi or  stridor  Cardiovascular: Regular rate and rhythm, no murmur. Normal cap refill. No peripheral edema  GI: Abdomen soft, nontender  Musculoskeletal: Moving all extremities. No gross deformities.  Point tenderness over the mid lumbar spine.  No step-offs.  5 out of 5 strength in the lower extremities with good sensation and good distal pulses.  Integument: Warm, dry, no rashes or lesions  Neurologic: Alert and orientated x3. No focal deficits.  Psych: Normal mood and affect        LAB:  Labs Ordered and Resulted from Time of ED Arrival to Time of ED Departure   BASIC METABOLIC PANEL - Abnormal       Result Value    Sodium 134 (*)     Potassium 3.8      Chloride 99      Carbon Dioxide (CO2) 25      Anion Gap 10      Urea Nitrogen 18.1      Creatinine 1.63 (*)     GFR Estimate 35 (*)     Calcium 9.4      Glucose 170 (*)    CRP INFLAMMATION - Abnormal    CRP Inflammation 62.50 (*)    CBC WITH PLATELETS AND DIFFERENTIAL    WBC Count 9.1      RBC Count 4.03      Hemoglobin 12.8      Hematocrit 37.4      MCV 93      MCH 31.8      MCHC 34.2      RDW 12.6      Platelet Count 184      % Neutrophils 78      % Lymphocytes 14      % Monocytes 6      Mids % (Monos, Eos, Basos)        % Eosinophils 1      % Basophils 0      % Immature Granulocytes 1      NRBCs per 100 WBC 0      Absolute Neutrophils 7.2      Absolute Lymphocytes 1.3      Absolute Monocytes 0.5      Mids Abs (Monos, Eos, Basos)        Absolute Eosinophils 0.1      Absolute Basophils 0.0      Absolute Immature Granulocytes 0.1      Absolute NRBCs 0.0     BLOOD CULTURE   BLOOD CULTURE       RADIOLOGY:  MR Lumbar Spine w/o & w Contrast   Final Result   Addendum (preliminary) 1 of 1   Addendum/   impression:      Dr. Cao discussed the results with Dr. Houser on 10/21/2023 4:15 PM    CDT by telephone.      Final   IMPRESSION:   1.  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  Careful attention to the numbering convention is  recommended prior to any future percutaneous or surgical intervention.    2.  Edema, and enhancement is demonstrated centered on the L3/L4 and L4/L5 facet joints which extend into the dorsal aspect of the spinal canal with solid enhancement. Overall constellation of findings are consistent with septic arthritis at L3/L4 and    L4/L5 with extension into the dorsal epidural space with early dorsal epidural phlegmon at L3/L4 and L4/L5. Moderate spinal canal narrowing. Moderate spinal canal narrowing at the L3/L4 and L4/L5 levels.   3.  Edema within the left paraspinal musculature at L3/L4 and L4/L5, consistent with myositis.       Lumbar spine CT w/o contrast   Final Result   IMPRESSION:   1.  Transitional L5 vertebral body.   2.  No fracture or posttraumatic subluxation.   3.  Moderate left L4-L5 lateral recess and neural foraminal stenoses.              Madelin dRz PA-C   Emergency Medicine             Madelin Rdz PA-C  10/21/23 2996

## 2023-10-21 NOTE — ED NOTES
Pt stood at bedside, transferred to , was able to walk to bathroom from door with sba. Pt states pain improved.

## 2023-10-21 NOTE — ED TRIAGE NOTES
Pt presents to triage with c/o lower back pain 10/10 that started this morning. Denies numbness/tingling/weakness down either leg. Denies any trauma or falls.  Pt reports left hip pain on Thursday- pt took bengay and heat pad with no relief. Pt is on abx for urinary infection. Denies any pain on urination.   HX: diabetes, UTI's     Triage Assessment (Adult)       Row Name 10/21/23 1152          Triage Assessment    Airway WDL WDL        Respiratory WDL    Respiratory WDL WDL        Skin Circulation/Temperature WDL    Skin Circulation/Temperature WDL WDL        Cardiac WDL    Cardiac WDL X;rhythm     Pulse Rate & Regularity tachycardic        Peripheral/Neurovascular WDL    Peripheral Neurovascular WDL WDL        Cognitive/Neuro/Behavioral WDL    Cognitive/Neuro/Behavioral WDL WDL

## 2023-10-21 NOTE — PHARMACY-ADMISSION MEDICATION HISTORY
Pharmacist Admission Medication History    Admission medication history is complete. The information provided in this note is only as accurate as the sources available at the time of the update.    Information Source(s): Patient and CareEverywhere/SureScripts via in-person    Pertinent Information: none    Changes made to PTA medication list:  Added: all  Deleted: None  Changed: None    Medication Affordability:  Not including over the counter (OTC) medications, was there a time in the past 3 months when you did not take your medications as prescribed because of cost?: No    Allergies reviewed with patient and updates made in EHR: yes    Medication History Completed By: Blanquita Boogie [facility] 10/21/2023 5:07 PM    PTA Med List   Medication Sig Last Dose    cephALEXin (KEFLEX) 500 MG capsule Take 500 mg by mouth 2 times daily 10/21/2023 at am    cinnamon 500 MG CAPS Take 1 capsule by mouth daily 10/21/2023 at am    garlic 150 MG TABS tablet Take 150 mg by mouth daily 10/21/2023 at am    insulin glargine (BASAGLAR KWIKPEN) 100 UNIT/ML pen Inject 12-13 Units Subcutaneous every evening 10/20/2023 at pm    multivitamin, therapeutic (THERA-VIT) TABS tablet Take 1 tablet by mouth daily 10/21/2023 at am    thiamine (B-1) 100 MG tablet Take 100 mg by mouth daily 10/21/2023 at am    Vitamin D3 (VITAMIN D, CHOLECALCIFEROL,) 25 mcg (1000 units) tablet Take 1 tablet by mouth daily 10/21/2023 at am

## 2023-10-21 NOTE — ED NOTES
"[facility] ED Handoff Report    ED Chief Complaint:     ED Diagnosis:  (M46.56) Septic arthritis of lumbar spine (H24)  Comment:   Plan:        PMH:  History reviewed. No pertinent past medical history.     Code Status:  No Order     Falls Risk: Yes Band: Applied    Current Living Situation/Residence: lives with their son or daughter     Elimination Status: Continent: Yes     Activity Level: SBA    Patients Preferred Language:  English     Needed: Yes    Vital Signs:  BP (!) 148/82   Pulse 84   Temp 98.9  F (37.2  C) (Oral)   Resp 18   Ht 1.575 m (5' 2\")   Wt 73.5 kg (162 lb)   SpO2 97%   BMI 29.63 kg/m       Cardiac Rhythm:     Pain Score: 6/10     Is the Patient Confused:  No    Last Food or Drink: 10/21/23 at    Focused Assessment:  Pt presents to triage with c/o lower back pain 10/10 that started this morning. Denies numbness/tingling/weakness down either leg. Denies any trauma or falls.  Pt reports left hip pain on Thursday- pt took bengay and heat pad with no relief. Pt is on abx for urinary infection. Denies any pain on urination.   HX: diabetes, UTI's    Was instructed to hold antibiotics by provider until she hears back from ID.     Tests Performed: Done: Labs and Imaging  Treatments Provided:      Family Dynamics/Concerns: No    Family Updated On Visitor Policy: Yes    Plan of Care Communicated to Family: Yes    Who Was Updated about Plan of Care: daughter    Belongings Checklist Done and Signed by Patient: Yes    Medications sent with patient: shielao    Covid: asymptomatic , na    Additional Information:     Batsheva Stallworth RN   10/21/2023 5:49 PM       "

## 2023-10-22 ENCOUNTER — APPOINTMENT (OUTPATIENT)
Dept: INTERVENTIONAL RADIOLOGY/VASCULAR | Facility: HOSPITAL | Age: 63
DRG: 552 | End: 2023-10-22
Attending: RADIOLOGY
Payer: COMMERCIAL

## 2023-10-22 LAB
ANION GAP SERPL CALCULATED.3IONS-SCNC: 7 MMOL/L (ref 7–15)
BUN SERPL-MCNC: 22.4 MG/DL (ref 8–23)
CALCIUM SERPL-MCNC: 8.8 MG/DL (ref 8.8–10.2)
CHLORIDE SERPL-SCNC: 104 MMOL/L (ref 98–107)
CREAT SERPL-MCNC: 1.9 MG/DL (ref 0.51–0.95)
CRP SERPL-MCNC: 124 MG/L
DEPRECATED HCO3 PLAS-SCNC: 26 MMOL/L (ref 22–29)
EGFRCR SERPLBLD CKD-EPI 2021: 29 ML/MIN/1.73M2
ERYTHROCYTE [DISTWIDTH] IN BLOOD BY AUTOMATED COUNT: 12.6 % (ref 10–15)
GLUCOSE BLDC GLUCOMTR-MCNC: 142 MG/DL (ref 70–99)
GLUCOSE BLDC GLUCOMTR-MCNC: 168 MG/DL (ref 70–99)
GLUCOSE BLDC GLUCOMTR-MCNC: 185 MG/DL (ref 70–99)
GLUCOSE BLDC GLUCOMTR-MCNC: 204 MG/DL (ref 70–99)
GLUCOSE SERPL-MCNC: 151 MG/DL (ref 70–99)
HCT VFR BLD AUTO: 34.7 % (ref 35–47)
HGB BLD-MCNC: 11.4 G/DL (ref 11.7–15.7)
MCH RBC QN AUTO: 31.6 PG (ref 26.5–33)
MCHC RBC AUTO-ENTMCNC: 32.9 G/DL (ref 31.5–36.5)
MCV RBC AUTO: 96 FL (ref 78–100)
PLATELET # BLD AUTO: 163 10E3/UL (ref 150–450)
POTASSIUM SERPL-SCNC: 4.1 MMOL/L (ref 3.4–5.3)
RBC # BLD AUTO: 3.61 10E6/UL (ref 3.8–5.2)
SODIUM SERPL-SCNC: 137 MMOL/L (ref 135–145)
WBC # BLD AUTO: 5.6 10E3/UL (ref 4–11)

## 2023-10-22 PROCEDURE — 120N000001 HC R&B MED SURG/OB

## 2023-10-22 PROCEDURE — 62267 INTERDISCAL PERQ ASPIR DX: CPT

## 2023-10-22 PROCEDURE — 99222 1ST HOSP IP/OBS MODERATE 55: CPT | Performed by: INTERNAL MEDICINE

## 2023-10-22 PROCEDURE — 250N000012 HC RX MED GY IP 250 OP 636 PS 637: Performed by: EMERGENCY MEDICINE

## 2023-10-22 PROCEDURE — 86140 C-REACTIVE PROTEIN: CPT | Performed by: EMERGENCY MEDICINE

## 2023-10-22 PROCEDURE — 0S903ZX DRAINAGE OF LUMBAR VERTEBRAL JOINT, PERCUTANEOUS APPROACH, DIAGNOSTIC: ICD-10-PCS | Performed by: RADIOLOGY

## 2023-10-22 PROCEDURE — 36415 COLL VENOUS BLD VENIPUNCTURE: CPT | Performed by: EMERGENCY MEDICINE

## 2023-10-22 PROCEDURE — 250N000011 HC RX IP 250 OP 636: Mod: JZ | Performed by: INTERNAL MEDICINE

## 2023-10-22 PROCEDURE — 250N000013 HC RX MED GY IP 250 OP 250 PS 637: Performed by: INTERNAL MEDICINE

## 2023-10-22 PROCEDURE — 250N000013 HC RX MED GY IP 250 OP 250 PS 637: Performed by: EMERGENCY MEDICINE

## 2023-10-22 PROCEDURE — 250N000011 HC RX IP 250 OP 636: Performed by: RADIOLOGY

## 2023-10-22 PROCEDURE — 87205 SMEAR GRAM STAIN: CPT | Performed by: RADIOLOGY

## 2023-10-22 PROCEDURE — 80048 BASIC METABOLIC PNL TOTAL CA: CPT | Performed by: EMERGENCY MEDICINE

## 2023-10-22 PROCEDURE — 99233 SBSQ HOSP IP/OBS HIGH 50: CPT | Performed by: INTERNAL MEDICINE

## 2023-10-22 PROCEDURE — 87075 CULTR BACTERIA EXCEPT BLOOD: CPT | Performed by: RADIOLOGY

## 2023-10-22 PROCEDURE — 85027 COMPLETE CBC AUTOMATED: CPT | Performed by: EMERGENCY MEDICINE

## 2023-10-22 PROCEDURE — 250N000011 HC RX IP 250 OP 636: Performed by: EMERGENCY MEDICINE

## 2023-10-22 RX ORDER — HYDRALAZINE HYDROCHLORIDE 20 MG/ML
10 INJECTION INTRAMUSCULAR; INTRAVENOUS EVERY 6 HOURS PRN
Status: DISCONTINUED | OUTPATIENT
Start: 2023-10-22 | End: 2023-10-27 | Stop reason: HOSPADM

## 2023-10-22 RX ORDER — LISINOPRIL 5 MG/1
5 TABLET ORAL DAILY
Status: DISCONTINUED | OUTPATIENT
Start: 2023-10-22 | End: 2023-10-23

## 2023-10-22 RX ORDER — NALOXONE HYDROCHLORIDE 0.4 MG/ML
0.2 INJECTION, SOLUTION INTRAMUSCULAR; INTRAVENOUS; SUBCUTANEOUS
Status: DISCONTINUED | OUTPATIENT
Start: 2023-10-22 | End: 2023-10-22

## 2023-10-22 RX ORDER — FENTANYL CITRATE 50 UG/ML
25-50 INJECTION, SOLUTION INTRAMUSCULAR; INTRAVENOUS EVERY 5 MIN PRN
Status: DISCONTINUED | OUTPATIENT
Start: 2023-10-22 | End: 2023-10-22

## 2023-10-22 RX ORDER — NALOXONE HYDROCHLORIDE 0.4 MG/ML
0.4 INJECTION, SOLUTION INTRAMUSCULAR; INTRAVENOUS; SUBCUTANEOUS
Status: DISCONTINUED | OUTPATIENT
Start: 2023-10-22 | End: 2023-10-22

## 2023-10-22 RX ORDER — CEFTRIAXONE 2 G/1
2 INJECTION, POWDER, FOR SOLUTION INTRAMUSCULAR; INTRAVENOUS EVERY 24 HOURS
Status: DISCONTINUED | OUTPATIENT
Start: 2023-10-22 | End: 2023-10-27 | Stop reason: HOSPADM

## 2023-10-22 RX ORDER — FLUMAZENIL 0.1 MG/ML
0.2 INJECTION, SOLUTION INTRAVENOUS
Status: DISCONTINUED | OUTPATIENT
Start: 2023-10-22 | End: 2023-10-22

## 2023-10-22 RX ORDER — VANCOMYCIN HYDROCHLORIDE 1 G/200ML
1000 INJECTION, SOLUTION INTRAVENOUS EVERY 24 HOURS
Status: DISCONTINUED | OUTPATIENT
Start: 2023-10-22 | End: 2023-10-27 | Stop reason: HOSPADM

## 2023-10-22 RX ADMIN — ACETAMINOPHEN 975 MG: 325 TABLET ORAL at 18:12

## 2023-10-22 RX ADMIN — INSULIN GLARGINE 6 UNITS: 100 INJECTION, SOLUTION SUBCUTANEOUS at 21:57

## 2023-10-22 RX ADMIN — FENTANYL CITRATE 50 MCG: 50 INJECTION, SOLUTION INTRAMUSCULAR; INTRAVENOUS at 10:32

## 2023-10-22 RX ADMIN — MIDAZOLAM 1 MG: 1 INJECTION INTRAMUSCULAR; INTRAVENOUS at 10:30

## 2023-10-22 RX ADMIN — MORPHINE SULFATE 4 MG: 4 INJECTION, SOLUTION INTRAMUSCULAR; INTRAVENOUS at 00:41

## 2023-10-22 RX ADMIN — INSULIN ASPART 1 UNITS: 100 INJECTION, SOLUTION INTRAVENOUS; SUBCUTANEOUS at 13:08

## 2023-10-22 RX ADMIN — MIDAZOLAM 1 MG: 1 INJECTION INTRAMUSCULAR; INTRAVENOUS at 10:35

## 2023-10-22 RX ADMIN — Medication 25 MCG: at 08:17

## 2023-10-22 RX ADMIN — CEFTRIAXONE SODIUM 2 G: 2 INJECTION, POWDER, FOR SOLUTION INTRAMUSCULAR; INTRAVENOUS at 12:57

## 2023-10-22 RX ADMIN — MORPHINE SULFATE 4 MG: 4 INJECTION, SOLUTION INTRAMUSCULAR; INTRAVENOUS at 08:13

## 2023-10-22 RX ADMIN — MORPHINE SULFATE 4 MG: 4 INJECTION, SOLUTION INTRAMUSCULAR; INTRAVENOUS at 12:55

## 2023-10-22 RX ADMIN — MORPHINE SULFATE 4 MG: 4 INJECTION, SOLUTION INTRAMUSCULAR; INTRAVENOUS at 18:12

## 2023-10-22 RX ADMIN — POTASSIUM CHLORIDE AND SODIUM CHLORIDE: 900; 150 INJECTION, SOLUTION INTRAVENOUS at 00:35

## 2023-10-22 RX ADMIN — VANCOMYCIN HYDROCHLORIDE 1000 MG: 1 INJECTION, SOLUTION INTRAVENOUS at 13:31

## 2023-10-22 RX ADMIN — THIAMINE HCL TAB 100 MG 100 MG: 100 TAB at 08:17

## 2023-10-22 RX ADMIN — INSULIN ASPART 1 UNITS: 100 INJECTION, SOLUTION INTRAVENOUS; SUBCUTANEOUS at 08:19

## 2023-10-22 RX ADMIN — MORPHINE SULFATE 4 MG: 4 INJECTION, SOLUTION INTRAMUSCULAR; INTRAVENOUS at 22:04

## 2023-10-22 RX ADMIN — LISINOPRIL 5 MG: 5 TABLET ORAL at 19:48

## 2023-10-22 RX ADMIN — POTASSIUM CHLORIDE AND SODIUM CHLORIDE: 900; 150 INJECTION, SOLUTION INTRAVENOUS at 14:14

## 2023-10-22 RX ADMIN — THERA TABS 1 TABLET: TAB at 08:17

## 2023-10-22 ASSESSMENT — ACTIVITIES OF DAILY LIVING (ADL)
ADLS_ACUITY_SCORE: 36
ADLS_ACUITY_SCORE: 35
ADLS_ACUITY_SCORE: 35
ADLS_ACUITY_SCORE: 36
ADLS_ACUITY_SCORE: 35
ADLS_ACUITY_SCORE: 36
DEPENDENT_IADLS:: INDEPENDENT
ADLS_ACUITY_SCORE: 36
ADLS_ACUITY_SCORE: 35
ADLS_ACUITY_SCORE: 36
ADLS_ACUITY_SCORE: 35

## 2023-10-22 NOTE — H&P
ADMISSION HISTORY & PHYSICAL      No Ref-Primary, Physician, None  ASSESSMENT AND PLAN:  62 year old female presenting with back pain:    Lumbar septic arthritis: Lumbar MRI shows edema around L3/L4 and L4/L5 facet joints with extension dorsally and appear consistent with septic arthritis at L3-L4 and L4-L5 with extension into the dorsal epidural space with early dorsal epidural phlegmon in those areas.  Neurosurgery and ID consulted in the ED and recommending IR consult for aspiration which has been ordered.  No antibiotics recommended until aspiration performed.  She will be n.p.o. after midnight.  She is still having a fair amount of pain, IV morphine has been ordered.  White count is normal but CRP is 62.5, will trend.  Insulin-dependent type 2 diabetes: Is on 12 to 13 units of glargine at night.  Given that she will be n.p.o. after midnight we will reduce that to 6 for now.  Diabetic order set initiated.  History of hypertension: Does not appear to be on medications  Chronic kidney disease: Creatinine today 1.63, will monitor with IV fluids.  Baseline over the last year appears between 1.4 and 2.2.  Recently diagnosed UTI: UA in the clinic this week showed pyuria and she was started on Keflex.  I can find no urine culture results.  Holding antibiotics for now as above.  She is showing no evidence of sepsis or systemic infection.    Barriers to discharge: Septic lumbar joint      CHIEF COMPLAINT:  Back pain    HISTORY OF PRESENTING ILLNESS:  Ana Bunn is a 62 year old female with back pain which started about 2 days ago.  She was seen in the clinic and diagnosed with a UTI but her pain worsened.  It is persistent but much worse when she is standing and moving.  No fevers or chills, no abdominal pain, no chest pain or shortness of breath.  No recent or past history of back surgery or previous back problems.    PMH/PSH:  Patient Active Problem List   Diagnosis    Age-related nuclear cataract of both eyes     Chest pain    Anemia    Closed fracture of lower end of left radius with routine healing    E. coli UTI    High triglycerides    HTN (hypertension)    Microalbuminuria    Stage 3b chronic kidney disease (H)    Type 2 diabetes mellitus with microalbuminuria, with long-term current use of insulin (H)    Vitamin D insufficiency    Dizziness    Dyslipidemia    Esophageal reflux    Gait abnormality    History of abnormal cervical Pap smear    Imbalance    Mild traumatic brain injury (H)    Neck pain    Osteopenia    Right hip pain    Septic arthritis of lumbar spine (H24)       ALLERGIES:  No Known Allergies    MEDICATIONS:  Reviewed.  Current Facility-Administered Medications   Medication    acetaminophen (TYLENOL) tablet 975 mg    glucose gel 15-30 g    Or    dextrose 50 % injection 25-50 mL    Or    glucagon injection 1 mg    [START ON 10/22/2023] insulin aspart (NovoLOG) injection (RAPID ACTING)    insulin aspart (NovoLOG) injection (RAPID ACTING)    insulin glargine (LANTUS PEN) injection 6 Units    morphine (PF) injection 4 mg    [START ON 10/22/2023] multivitamin, therapeutic (THERA-VIT) tablet 1 tablet    naloxone (NARCAN) injection 0.2 mg    Or    naloxone (NARCAN) injection 0.4 mg    Or    naloxone (NARCAN) injection 0.2 mg    Or    naloxone (NARCAN) injection 0.4 mg    [START ON 10/22/2023] thiamine (B-1) tablet 100 mg    [START ON 10/22/2023] Vitamin D3 (CHOLECALCIFEROL) tablet 25 mcg       SOCIAL HISTORY:  Social History     Socioeconomic History    Marital status:      Spouse name: Not on file    Number of children: Not on file    Years of education: Not on file    Highest education level: Not on file   Occupational History    Not on file   Tobacco Use    Smoking status: Every Day     Packs/day: .5     Types: Cigarettes    Smokeless tobacco: Never   Substance and Sexual Activity    Alcohol use: Yes     Alcohol/week: 3.0 standard drinks of alcohol     Types: 3 Cans of beer per week     Comment:  "approx 3 beers/day    Drug use: Not on file    Sexual activity: Not on file   Other Topics Concern    Not on file   Social History Narrative    Not on file     Social Determinants of Health     Financial Resource Strain: Not on file   Food Insecurity: Not on file   Transportation Needs: Not on file   Physical Activity: Not on file   Stress: Not on file   Social Connections: Not on file   Interpersonal Safety: Not on file   Housing Stability: Not on file       FAMILY HISTORY:  History reviewed. No pertinent family history.    ROS:  12 point ROS was performed and found to be negative except for the pertinent positives mentioned in the HPI.      PHYSICAL EXAM:  BP (!) 162/94 (BP Location: Right arm)   Pulse 84   Temp 98  F (36.7  C) (Oral)   Resp 16   Ht 1.575 m (5' 2\")   Wt 73.5 kg (162 lb)   SpO2 99%   BMI 29.63 kg/m    No intake/output data recorded.  No intake/output data recorded.  CONSTITUTIONAL: Mildly uncomfortable appearing  HEENT:       Sclera- anicteric      Mucous membrane- moist and pink  LUNGS: Clear to auscultation bilaterally  CARDIOVASCULAR: S1S2 regular. No murmurs, rubs or gallops,no pedal edema  GI: Soft. Non-tender, non-distended. No organomegaly. No guarding or rigidity. Bowel sounds- active  NEURO: Cranial nerves II-XII grossly intact. No focal neurological deficit. No involuntary movements  INTGM: No jaundice, no cyanosis or clubbing  LYMPH: no adenopathy  MSK: no joint swelling or tenderness  PSYCH: alert and oriented x 3, no agitation      DIAGNOSTIC DATA:  Recent Results (from the past 24 hour(s))   Basic metabolic panel    Collection Time: 10/21/23 12:48 PM   Result Value Ref Range    Sodium 134 (L) 135 - 145 mmol/L    Potassium 3.8 3.4 - 5.3 mmol/L    Chloride 99 98 - 107 mmol/L    Carbon Dioxide (CO2) 25 22 - 29 mmol/L    Anion Gap 10 7 - 15 mmol/L    Urea Nitrogen 18.1 8.0 - 23.0 mg/dL    Creatinine 1.63 (H) 0.51 - 0.95 mg/dL    GFR Estimate 35 (L) >60 mL/min/1.73m2    Calcium 9.4 " 8.8 - 10.2 mg/dL    Glucose 170 (H) 70 - 99 mg/dL   CRP inflammation    Collection Time: 10/21/23 12:48 PM   Result Value Ref Range    CRP Inflammation 62.50 (H) <5.00 mg/L   CBC with platelets and differential    Collection Time: 10/21/23 12:48 PM   Result Value Ref Range    WBC Count 9.1 4.0 - 11.0 10e3/uL    RBC Count 4.03 3.80 - 5.20 10e6/uL    Hemoglobin 12.8 11.7 - 15.7 g/dL    Hematocrit 37.4 35.0 - 47.0 %    MCV 93 78 - 100 fL    MCH 31.8 26.5 - 33.0 pg    MCHC 34.2 31.5 - 36.5 g/dL    RDW 12.6 10.0 - 15.0 %    Platelet Count 184 150 - 450 10e3/uL    % Neutrophils 78 %    % Lymphocytes 14 %    % Monocytes 6 %    Mids % (Monos, Eos, Basos)      % Eosinophils 1 %    % Basophils 0 %    % Immature Granulocytes 1 %    NRBCs per 100 WBC 0 <1 /100    Absolute Neutrophils 7.2 1.6 - 8.3 10e3/uL    Absolute Lymphocytes 1.3 0.8 - 5.3 10e3/uL    Absolute Monocytes 0.5 0.0 - 1.3 10e3/uL    Mids Abs (Monos, Eos, Basos)      Absolute Eosinophils 0.1 0.0 - 0.7 10e3/uL    Absolute Basophils 0.0 0.0 - 0.2 10e3/uL    Absolute Immature Granulocytes 0.1 <=0.4 10e3/uL    Absolute NRBCs 0.0 10e3/uL     All lab studies reviewed personally    MR Lumbar Spine w/o & w Contrast    Addendum Date: 10/21/2023    Addendum/ impression: Dr. Cao discussed the results with Dr. Houser on 10/21/2023 4:15 PM CDT by telephone.    Result Date: 10/21/2023  EXAM: MR LUMBAR SPINE W/O and W CONTRAST LOCATION: [facility] DATE: 10/21/2023 INDICATION: low point tenderness in lumbar spine. Not radicular. High CRP. Concern for infectious process COMPARISON: Lumbar spine CT 10/21/2023. CONTRAST: 7ml Gadavist TECHNIQUE: Routine Lumbar Spine MRI without and with IV contrast. FINDINGS: Transitional vertebral anatomy with with a partially sacralized L5 vertebral body.  Careful attention to the numbering convention is recommended prior to any future percutaneous or surgical intervention. Anterolisthesis of L4 and L5 measuring 7 mm.  Anterior marginal osteophytes at L1/L2 - L4/L5. The vertebral bodies of the lumbar spine otherwise have normal stature, alignment, and marrow signal intensity. Normal distal spinal cord and cauda equina with conus medullaris at the partially imaged intra-abdominal contents are unremarkable. The conus terminates at the inferior plate of L1. The partially imaged intracranial contents are unremarkable. Edema within the left paraspinal musculature at L3/L4 and L4/L5, consistent with myositis. Unremarkable visualized bony pelvis. T12-L1: Normal disc signal and disc height. No posterior disc bulge or spinal canal narrowing. No neural foraminal narrowing. L1-L2: Normal disc signal and disc height. No posterior disc bulge or spinal canal narrowing. Mild bilateral facet arthropathy. No neural foraminal narrowing. L2-L3: Normal disc signal and disc height. No posterior disc bulge or spinal canal narrowing. No neural foraminal narrowing. L3-L4: Edema, and enhancement is demonstrated centered on the L3/L4 and L4/L5 facet joints which extend into the dorsal aspect of the spinal canal with solid enhancement. Overall constellation of findings are consistent with septic arthritis at L3/L4 and  L4/L5 with extension into the dorsal epidural space with early dorsal epidural phlegmon at L3/L4 and L4/L5. Moderate spinal canal narrowing. Mild bilateral neural foraminal narrowing. L4-L5: Mild loss of disc signal and disc height. Symmetric disc bulge and moderate facet arthropathy moderate spinal spinal canal narrowing. Symmetric disc bulge without significant spinal canal narrowing. Moderate bilateral neural foraminal narrowing. L5-S1: No posterior disc bulge or spinal canal narrowing. No neural foraminal narrowing.     IMPRESSION: 1.  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  Careful attention to the numbering convention is recommended prior to any future percutaneous or surgical intervention. 2.   Edema, and enhancement is demonstrated centered on the L3/L4 and L4/L5 facet joints which extend into the dorsal aspect of the spinal canal with solid enhancement. Overall constellation of findings are consistent with septic arthritis at L3/L4 and L4/L5 with extension into the dorsal epidural space with early dorsal epidural phlegmon at L3/L4 and L4/L5. Moderate spinal canal narrowing. Moderate spinal canal narrowing at the L3/L4 and L4/L5 levels. 3.  Edema within the left paraspinal musculature at L3/L4 and L4/L5, consistent with myositis.     Lumbar spine CT w/o contrast    Result Date: 10/21/2023  EXAM: CT LUMBAR SPINE W/O CONTRAST LOCATION: [facility] DATE: 10/21/2023 INDICATION: Mid lumbar point tenderness COMPARISON: None. TECHNIQUE: Routine CT Lumbar Spine without IV contrast. Multiplanar reformats. Dose reduction techniques were used. FINDINGS: VERTEBRA: Transitional L5 vertebral body. Normal vertebral body heights and alignment. No fracture or posttraumatic subluxation. CANAL/FORAMINA: Moderate left L4-L5 lateral recess and neural foraminal stenoses. PARASPINAL: No extraspinal abnormality.     IMPRESSION: 1.  Transitional L5 vertebral body. 2.  No fracture or posttraumatic subluxation. 3.  Moderate left L4-L5 lateral recess and neural foraminal stenoses.    Radiology report reviewed.    Medical Decision Making       75 MINUTES SPENT BY ME on the date of service doing chart review, history, exam, documentation & further activities per the note.      Tal Rodrigez MD  [facility] Medicine Service

## 2023-10-22 NOTE — PLAN OF CARE
Problem: Adult Inpatient Plan of Care  Goal: Absence of Hospital-Acquired Illness or Injury  Intervention: Identify and Manage Fall Risk  Recent Flowsheet Documentation  Taken 10/22/2023 0810 by Asha Maddox RN  Safety Promotion/Fall Prevention: activity supervised     Problem: Adult Inpatient Plan of Care  Goal: Absence of Hospital-Acquired Illness or Injury  Intervention: Prevent Skin Injury  Recent Flowsheet Documentation  Taken 10/22/2023 0810 by Asha Maddox RN  Body Position: position changed independently   Goal Outcome Evaluation:       Patient returned from the IR denies pain on arrival later rated pain on her low back @ 5/10 prn morphine given for comfort. VSS. Had lunch from home, no complain of nausea/vomit. Blood sugar 168, coverage given per orders.

## 2023-10-22 NOTE — PLAN OF CARE
Assumed care 1500 to 1930. A&O x 4. Assist x 1. C/O back pain, PRN Tylenol & Morphine given (see MAR). Up to toilet. Family at bedside in the evening. Call light within reach, able to make needs known. Bed alarm on for safety.    Problem: Adult Inpatient Plan of Care  Goal: Optimal Comfort and Wellbeing  Intervention: Monitor Pain and Promote Comfort  Recent Flowsheet Documentation  Taken 10/22/2023 1630 by CHEY DUGAN  Pain Management Interventions: medication (see MAR)

## 2023-10-22 NOTE — PHARMACY-VANCOMYCIN DOSING SERVICE
Pharmacy Vancomycin Initial Note  Date of Service 2023  Patient's  1960  62 year old, female    Indication: Abscess    Current estimated CrCl = Estimated Creatinine Clearance: 28.8 mL/min (A) (based on SCr of 1.9 mg/dL (H)).    Creatinine for last 3 days  10/21/2023: 12:48 PM Creatinine 1.63 mg/dL  10/22/2023:  6:33 AM Creatinine 1.90 mg/dL    Recent Vancomycin Level(s) for last 3 days  No results found for requested labs within last 3 days.      Vancomycin IV Administrations (past 72 hours)        No vancomycin orders with administrations in past 72 hours.                    Nephrotoxins and other renal medications (From now, onward)      None            Contrast Orders - past 72 hours (72h ago, onward)      Start     Dose/Rate Route Frequency Stop    10/21/23 1600  gadobutrol (GADAVIST) injection 7 mL         7 mL Intravenous ONCE 10/21/23 1535            Insight[employer] Prediction of Planned Initial Vancomycin Regimen  Loading dose: N/A  Regimen: 1000 mg IV every 24 hours.  Start time: 12:08 on 10/22/2023  Exposure target: AUC24 (range)400-600 mg/L.hr   AUC24,ss: 537 mg/L.hr  Probability of AUC24 > 400: 82 %  Ctrough,ss: 17.6 mg/L  Probability of Ctrough,ss > 20: 36 %  Probability of nephrotoxicity (Lodise CORWIN ): 13 %          Plan:  Start vancomycin  1000 mg IV q24h.   Vancomycin monitoring method: AUC  Vancomycin therapeutic monitoring goal: 400-600 mg*h/L  Pharmacy will check vancomycin levels as appropriate in 1-3 Days.    Serum creatinine levels will be ordered daily for the first week of therapy and at least twice weekly for subsequent weeks.      YAZAN HUERTAS [facility]

## 2023-10-22 NOTE — CONSULTS
Infectious Disease Consultation:  Requesting MD:  Reason for consult:      HISTORY:  Lady with very rapid, not indolent, onset LBP.  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  Denies spinal injections, recent surgery etc.  Found on imaging to have L3/4 septic facet jt/arthritis.  Post IR bx.  Also early phlegmon.  Nothing clear for need of open surgery.      Oct 19 [facility] sent urine, had  wbc and a culture looks to have been set up.  No results.  August had e coli UTI.    If you look at her urologic history she has 20+ utis with e coli.       Pertinent past history, past infectious disease history:  PMH/PSH:      Patient Active Problem List   Diagnosis    Age-related nuclear cataract of both eyes    Chest pain    Anemia    Closed fracture of lower end of left radius with routine healing    E. coli UTI    High triglycerides    HTN (hypertension)    Microalbuminuria    Stage 3b chronic kidney disease (H)    Type 2 diabetes mellitus with microalbuminuria, with long-term current use of insulin (H)    Vitamin D insufficiency    Dizziness    Dyslipidemia    Esophageal reflux    Gait abnormality    History of abnormal cervical Pap smear    Imbalance    Mild traumatic brain injury (H)    Neck pain    Osteopenia    Right hip pain    Septic arthritis of lumbar spine (H24)         ALLERGIES:  No Known Allergies     MEDICATIONS:  Reviewed.      Current Facility-Administered Medications   Medication    acetaminophen (TYLENOL) tablet 975 mg    glucose gel 15-30 g     Or    dextrose 50 % injection 25-50 mL     Or    glucagon injection 1 mg    [START ON 10/22/2023] insulin aspart (NovoLOG) injection (RAPID ACTING)    insulin aspart (NovoLOG) injection (RAPID ACTING)    insulin glargine (LANTUS PEN) injection 6 Units    morphine (PF) injection 4 mg    [START ON 10/22/2023] multivitamin, therapeutic (THERA-VIT) tablet 1 tablet    naloxone (NARCAN) injection 0.2 mg     Or    naloxone (NARCAN) injection 0.4 mg     Or    naloxone (NARCAN)  "injection 0.2 mg     Or    naloxone (NARCAN) injection 0.4 mg    [START ON 10/22/2023] thiamine (B-1) tablet 100 mg    [START ON 10/22/2023] Vitamin D3 (CHOLECALCIFEROL) tablet 25 mcg         SOCIAL HISTORY:  Social History   Social History            Socioeconomic History    Marital status:        Spouse name: Not on file    Number of children: Not on file    Years of education: Not on file    Highest education level: Not on file   Occupational History    Not on file   Tobacco Use    Smoking status: Every Day       Packs/day: .5       Types: Cigarettes    Smokeless tobacco: Never   Substance and Sexual Activity    Alcohol use: Yes       Alcohol/week: 3.0 standard drinks of alcohol       Types: 3 Cans of beer per week       Comment: approx 3 beers/day    Drug use: Not on file    Sexual activity: Not on file   Other Topics Concern    Not on file   Social History Narrative    Not on file      Social Determinants of Health      Financial Resource Strain: Not on file   Food Insecurity: Not on file   Transportation Needs: Not on file   Physical Activity: Not on file   Stress: Not on file   Social Connections: Not on file   Interpersonal Safety: Not on file   Housing Stability: Not on file            FAMILY HISTORY:  Family History   History reviewed. No pertinent family history.          Medications:  Reviewed prior to admission meds as applicable in chart review.  Current meds are reviewed in the EMR listed MAR.     ANTIBIOTICS:    Current:   Prior:   Allergy to:    SH/FH and  travel history(if applicable to consult):  above  REVIEW OF SYSTEMS:  All other systems negative    EXAMINATION:  BP (!) 144/75   Pulse 85   Temp 98  F (36.7  C) (Oral)   Resp 23   Ht 1.575 m (5' 2\")   Wt 73.5 kg (162 lb)   SpO2 94%   BMI 29.63 kg/m    Alert, awake  Vitals tabulated above, reviewed  HEENT:nl, husky voice  Neck supple without lymphadenopathy  Sclera clear  CARDIOVASCULAR regular rate and rhythm, no murmur  Lungs " "CLEAR TO AUSCULTATION   Abdomen soft, NT/ND, absent HEPATOSPLENOMEGALY  Skin normal  Joints normal  Neurologic exam non focal  Wound:  NA        CLINICAL DATABASE FOR---LAB/MICRO/CULTURES/IMAGING STUDIES:  Lab Results   Component Value Date    WBC 5.6 10/22/2023     Lab Results   Component Value Date    RBC 3.61 10/22/2023     Lab Results   Component Value Date    HGB 11.4 10/22/2023     Lab Results   Component Value Date    HCT 34.7 10/22/2023     No components found for: \"MCT\"  Lab Results   Component Value Date    MCV 96 10/22/2023     Lab Results   Component Value Date    MCH 31.6 10/22/2023     Lab Results   Component Value Date    MCHC 32.9 10/22/2023     Lab Results   Component Value Date    RDW 12.6 10/22/2023     Lab Results   Component Value Date     10/22/2023       Last Comprehensive Metabolic Panel:  Sodium   Date Value Ref Range Status   10/22/2023 137 135 - 145 mmol/L Final     Comment:     Reference intervals for this test were updated on 09/26/2023 to more accurately reflect our healthy population. There may be differences in the flagging of prior results with similar values performed with this method. Interpretation of those prior results can be made in the context of the updated reference intervals.      Potassium   Date Value Ref Range Status   10/22/2023 4.1 3.4 - 5.3 mmol/L Final   04/19/2022 4.0 3.5 - 5.0 mmol/L Final     Chloride   Date Value Ref Range Status   10/22/2023 104 98 - 107 mmol/L Final   04/19/2022 99 98 - 107 mmol/L Final     Carbon Dioxide (CO2)   Date Value Ref Range Status   10/22/2023 26 22 - 29 mmol/L Final   04/19/2022 22 22 - 31 mmol/L Final     Anion Gap   Date Value Ref Range Status   10/22/2023 7 7 - 15 mmol/L Final   04/19/2022 11 5 - 18 mmol/L Final     Glucose   Date Value Ref Range Status   10/22/2023 151 (H) 70 - 99 mg/dL Final   04/19/2022 145 (H) 70 - 125 mg/dL Final     GLUCOSE BY METER POCT   Date Value Ref Range Status   10/21/2023 208 (H) 70 - 99 mg/dL " "Final     Urea Nitrogen   Date Value Ref Range Status   10/22/2023 22.4 8.0 - 23.0 mg/dL Final   04/19/2022 17 8 - 22 mg/dL Final     Creatinine   Date Value Ref Range Status   10/22/2023 1.90 (H) 0.51 - 0.95 mg/dL Final     GFR Estimate   Date Value Ref Range Status   10/22/2023 29 (L) >60 mL/min/1.73m2 Final     Calcium   Date Value Ref Range Status   10/22/2023 8.8 8.8 - 10.2 mg/dL Final       Liver Function Studies -   Recent Labs   Lab Test 02/07/22  0830   PROTTOTAL 6.6   ALBUMIN 3.5   BILITOTAL 0.2   ALKPHOS 61   AST 20   ALT 20       No results found for: \"SED\"    CRP   Date Value Ref Range Status   02/07/2022 0.5 0.0-<0.8 mg/dL Final      Latest Reference Range & Units 10/22/23 06:33   CRP Inflammation <5.00 mg/L 124.00 (H)   (H): Data is abnormally high                                                              IMPRESSION:  1.  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  Careful attention to the numbering convention is recommended prior to any future percutaneous or surgical intervention.   2.  Edema, and enhancement is demonstrated centered on the L3/L4 and L4/L5 facet joints which extend into the dorsal aspect of the spinal canal with solid enhancement. Overall constellation of findings are consistent with septic arthritis at L3/L4 and   L4/L5 with extension into the dorsal epidural space with early dorsal epidural phlegmon at L3/L4 and L4/L5. Moderate spinal canal narrowing. Moderate spinal canal narrowing at the L3/L4 and L4/L5 levels.  3.  Edema within the left paraspinal musculature at L3/L4 and L4/L5, consistent with myositis.      Order-Level Documents:        IMPRESSION:  Lumbar infection as described in MRI above, very rapid, spontaneous onset  Creatinine nearly 2, 1.5 in mid 2022  Innumerable E coli UTIs which by definition means innumerable courses of abx     PLAN:  Bx done  Start vanco and rocephin  Follow results  Would strongly consider urology " consult tomorrow, why constant UTIs, is there now a fistula, should she be scoped etc etc.        ART OLSON MD  [facility]  Office [phone]

## 2023-10-22 NOTE — CONSULTS
[facility]    Neurosurgery Consultation     Date of Admission:  10/21/2023  Date of Consult (When I saw the patient): 10/22/23    Assessment & Plan   Ana Bunn is a 62 year old female who was admitted on 10/21/2023. Ana Bunn is a 62 year old female who presented with acute onset back pain yesterday AM with radiographic evidence of L3-4, L4-5 septic arthritis and myositis with early dorsal epidural phlegmon but no drainable abscess.     Patient admits back pain started randomly, denies prior falls or trauma. Pain is localized to L3-5 and does not radiate into lower extremities. Denies radicular symptoms, paresthesias, weakness, bowel/bladder changes. Pain aggravated with movement and upright and improves with medications and laying still. Denies fever or chills. Denies prior back surgeries.     WBC 5.6   (62.50 yesterday)    EXAM: MR LUMBAR SPINE W/O and W CONTRAST  LOCATION: [facility]  DATE: 10/21/2023                                                       IMPRESSION:  1.  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  Careful attention to the numbering convention is recommended prior to any future percutaneous or surgical intervention.   2.  Edema, and enhancement is demonstrated centered on the L3/L4 and L4/L5 facet joints which extend into the dorsal aspect of the spinal canal with solid enhancement. Overall constellation of findings are consistent with septic arthritis at L3/L4 and   L4/L5 with extension into the dorsal epidural space with early dorsal epidural phlegmon at L3/L4 and L4/L5. Moderate spinal canal narrowing. Moderate spinal canal narrowing at the L3/L4 and L4/L5 levels.  3.  Edema within the left paraspinal musculature at L3/L4 and L4/L5, consistent with myositis.     Clinical history, imaging and plans reviewed myself as well as with Dr. Hartley. No drainable abscess at this time.  Recommend work up and treatment per ID. Recommend pain management also. Will sign off, please re consult if there is a drainable abscess.     I have discussed the following assessment and plan with Dr. Hartley who is in agreement with the initial plan and will follow up with further consultation recommendations.    Cata Starr PA-C  [facility] Neurosurgery  [address]  [address]  [place] [address]    Tel [phone]  Pager [phone]    Code Status    Full Code    Reason for Consult   Reason for consult: I was asked by Dr. Rodrigez to evaluate this patient for septic arthritis.    Primary Care Physician   Physician No Ref-Primary    Chief Complaint   Back pain     History is obtained from the patient, electronic health record, and emergency department physician    History of Present Illness   Ana Bunn is a 62 year old female who presented with acute onset back pain yesterday AM with radiographic evidence of L3-4, L4-5 septic arthritis and myositis with early dorsal epidural phlegmon but no drainable abscess.     Patient admits back pain started randomly, denies prior falls or trauma. Pain is localized to L3-5 and does not radiate into lower extremities. Denies radicular symptoms, paresthesias, weakness, bowel/bladder changes. Pain aggravated with movement and upright and improves with medications and laying still. Denies fever or chills. Denies prior back surgeries.     WBC 5.6   (62.50 yesterday)    EXAM: MR LUMBAR SPINE W/O and W CONTRAST  LOCATION: [facility]  DATE: 10/21/2023                                                       IMPRESSION:  1.  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  Careful attention to the numbering convention is recommended prior to any future percutaneous or surgical intervention.   2.  Edema, and enhancement is demonstrated centered on the L3/L4 and L4/L5  facet joints which extend into the dorsal aspect of the spinal canal with solid enhancement. Overall constellation of findings are consistent with septic arthritis at L3/L4 and   L4/L5 with extension into the dorsal epidural space with early dorsal epidural phlegmon at L3/L4 and L4/L5. Moderate spinal canal narrowing. Moderate spinal canal narrowing at the L3/L4 and L4/L5 levels.  3.  Edema within the left paraspinal musculature at L3/L4 and L4/L5, consistent with myositis.   Past Medical History   I have reviewed this patient's medical history and updated it with pertinent information if needed.   History reviewed. No pertinent past medical history.    Past Surgical History   I have reviewed this patient's surgical history and updated it with pertinent information if needed.  History reviewed. No pertinent surgical history.    Prior to Admission Medications   Prior to Admission Medications   Prescriptions Last Dose Informant Patient Reported? Taking?   Vitamin D3 (VITAMIN D, CHOLECALCIFEROL,) 25 mcg (1000 units) tablet 10/21/2023 at am  Yes Yes   Sig: Take 1 tablet by mouth daily   cephALEXin (KEFLEX) 500 MG capsule 10/21/2023 at am  Yes Yes   Sig: Take 500 mg by mouth 2 times daily   cinnamon 500 MG CAPS 10/21/2023 at am  Yes Yes   Sig: Take 1 capsule by mouth daily   garlic 150 MG TABS tablet 10/21/2023 at am  Yes Yes   Sig: Take 150 mg by mouth daily   insulin glargine (BASAGLAR KWIKPEN) 100 UNIT/ML pen 10/20/2023 at pm  Yes Yes   Sig: Inject 12-13 Units Subcutaneous every evening   multivitamin, therapeutic (THERA-VIT) TABS tablet 10/21/2023 at am  Yes Yes   Sig: Take 1 tablet by mouth daily   thiamine (B-1) 100 MG tablet 10/21/2023 at am  Yes Yes   Sig: Take 100 mg by mouth daily      Facility-Administered Medications: None     Allergies   No Known Allergies    Social History   I have reviewed this patient's social history and updated it with pertinent information if needed. Ana Bunn  reports that she  "has been smoking cigarettes. She has been smoking an average of .5 packs per day. She has never used smokeless tobacco. She reports current alcohol use of about 3.0 standard drinks of alcohol per week.    Family History   I have reviewed this patient's family history and updated it with pertinent information if needed.   History reviewed. No pertinent family history.    Review of Systems    ROS: 10 point ROS neg other than the symptoms noted above in the HPI.      Physical Exam   Temp: 98  F (36.7  C) Temp src: Oral BP: (!) 197/97 Pulse: 92   Resp: 18 SpO2: 99 % O2 Device: None (Room air)    Vital Signs with Ranges  Temp:  [98  F (36.7  C)-98.9  F (37.2  C)] 98  F (36.7  C)  Pulse:  [] 92  Resp:  [16-20] 18  BP: (140-197)/(77-97) 197/97  SpO2:  [94 %-99 %] 99 %  162 lbs 0 oz     , Blood pressure (!) 197/97, pulse 92, temperature 98  F (36.7  C), temperature source Oral, resp. rate 18, height 5' 2\" (1.575 m), weight 162 lb (73.5 kg), SpO2 99%.  162 lbs 0 oz  NEUROLOGICAL EXAMINATION:     Mental status:  Alert and appropriate, speech is fluent.  Cranial nerves:  II-XII intact.   Motor:    Hip Flexor:                Right: 5/5  Left:  5/5. Left>right pain limiting hip flexion  Hip Adductor:             Right:  5/5  Left:  5/5  Hip Abductor:             Right:  5/5  Left:  5/5  Gastroc Soleus:        Right:  5/5  Left:  5/5  Tib/Ant:                      Right:  5/5  Left:  5/5  EHL:                     Right:  5/5  Left:  5/5  Sensation:  intact to light touch throughout BLE  Reflexes:   Negative Babinski.  Negative Clonus.      Lumbar examination reveals tenderness of the lower spine and paraspinous muscles.  Straight leg raise is positive bilaterally.       Data   All new lab and imaging data was personally reviewed by me.  EXAM: MR LUMBAR SPINE W/O and W CONTRAST  LOCATION: [facility]  DATE: 10/21/2023     INDICATION: low point tenderness in lumbar spine. Not radicular. High CRP. " Concern for infectious process  COMPARISON: Lumbar spine CT 10/21/2023.  CONTRAST: 7ml Gadavist  TECHNIQUE: Routine Lumbar Spine MRI without and with IV contrast.     FINDINGS:   Transitional vertebral anatomy with with a partially sacralized L5 vertebral body.  Careful attention to the numbering convention is recommended prior to any future percutaneous or surgical intervention. Anterolisthesis of L4 and L5 measuring 7 mm.   Anterior marginal osteophytes at L1/L2 - L4/L5. The vertebral bodies of the lumbar spine otherwise have normal stature, alignment, and marrow signal intensity. Normal distal spinal cord and cauda equina with conus medullaris at the partially imaged   intra-abdominal contents are unremarkable. The conus terminates at the inferior plate of L1. The partially imaged intracranial contents are unremarkable. Edema within the left paraspinal musculature at L3/L4 and L4/L5, consistent with myositis.   Unremarkable visualized bony pelvis.     T12-L1: Normal disc signal and disc height. No posterior disc bulge or spinal canal narrowing. No neural foraminal narrowing.      L1-L2: Normal disc signal and disc height. No posterior disc bulge or spinal canal narrowing. Mild bilateral facet arthropathy. No neural foraminal narrowing.     L2-L3: Normal disc signal and disc height. No posterior disc bulge or spinal canal narrowing. No neural foraminal narrowing.      L3-L4: Edema, and enhancement is demonstrated centered on the L3/L4 and L4/L5 facet joints which extend into the dorsal aspect of the spinal canal with solid enhancement. Overall constellation of findings are consistent with septic arthritis at L3/L4 and   L4/L5 with extension into the dorsal epidural space with early dorsal epidural phlegmon at L3/L4 and L4/L5. Moderate spinal canal narrowing. Mild bilateral neural foraminal narrowing.     L4-L5: Mild loss of disc signal and disc height. Symmetric disc bulge and moderate facet arthropathy moderate  spinal spinal canal narrowing. Symmetric disc bulge without significant spinal canal narrowing. Moderate bilateral neural foraminal narrowing.     L5-S1: No posterior disc bulge or spinal canal narrowing. No neural foraminal narrowing.                                                                      IMPRESSION:  1.  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  Careful attention to the numbering convention is recommended prior to any future percutaneous or surgical intervention.   2.  Edema, and enhancement is demonstrated centered on the L3/L4 and L4/L5 facet joints which extend into the dorsal aspect of the spinal canal with solid enhancement. Overall constellation of findings are consistent with septic arthritis at L3/L4 and   L4/L5 with extension into the dorsal epidural space with early dorsal epidural phlegmon at L3/L4 and L4/L5. Moderate spinal canal narrowing. Moderate spinal canal narrowing at the L3/L4 and L4/L5 levels.  3.  Edema within the left paraspinal musculature at L3/L4 and L4/L5, consistent with myositis.   CBC RESULTS:   Recent Labs   Lab Test 10/22/23  0633   WBC 5.6   RBC 3.61*   HGB 11.4*   HCT 34.7*   MCV 96   MCH 31.6   MCHC 32.9   RDW 12.6        Basic Metabolic Panel:  Lab Results   Component Value Date     10/22/2023      Lab Results   Component Value Date    POTASSIUM 4.1 10/22/2023    POTASSIUM 4.0 04/19/2022     Lab Results   Component Value Date    CHLORIDE 104 10/22/2023    CHLORIDE 99 04/19/2022     Lab Results   Component Value Date    HONG 8.8 10/22/2023     Lab Results   Component Value Date    CO2 26 10/22/2023    CO2 22 04/19/2022     Lab Results   Component Value Date    BUN 22.4 10/22/2023    BUN 17 04/19/2022     Lab Results   Component Value Date    CR 1.90 10/22/2023     Lab Results   Component Value Date     10/22/2023     10/21/2023     04/19/2022     INR:  Lab Results   Component Value Date     INR 0.97 04/19/2022

## 2023-10-22 NOTE — PROCEDURES
Neurointerventional Surgery  Post-procedure     Patient Name: Ana Bunn  MRN: [id number]  Date of Procedure: October 22, 2023    Procedure: left L4-5 facet joint aspiration   Radiologist: Thomas Zepeda MD    Fluoro Time: 1.1 minutes  Air Kerma: 64 mGy    DAP: 3093 mGycm2    Medications: Versed 2 mg IV                       Fentanyl 50 mcg IV  Sedation Time: 15 minutes    EBL: trace  Complications: none    Patient reevaluated immediately prior to sedation and prior to procedure.    Preliminary Report:   (See dictation for full detail)  Needle aspiration of the left L4-5 facet joint yields .5 ml bloody material.     Assess/Plan:  Samples to lab  Bedrest for one hour    Thomas Zepeda MD   Emergency pager: [phone]  Office: [phone]

## 2023-10-22 NOTE — PLAN OF CARE
Problem: Adult Inpatient Plan of Care  Goal: Plan of Care Review  Description: The Plan of Care Review/Shift note should be completed every shift.  The Outcome Evaluation is a brief statement about your assessment that the patient is improving, declining, or no change.  This information will be displayed automatically on your shift  note.  Outcome: Progressing   Goal Outcome Evaluation:               Pt alert and oriented, lower back pain, controlled pain with PRN tylenol and morphine, slept between cares,

## 2023-10-22 NOTE — PLAN OF CARE
Goal Outcome Evaluation:       Patient arrived the unit about 1830, alert and oriented x 4. Rated pain on her low back @ 4/10, she stated the medication she received from the ER helped so much. Patient has a planned procedure down in IR 10/22, She understands she will be nothing by mouth after midnight. Needed 1 person assist to transfer to bed.

## 2023-10-22 NOTE — PROGRESS NOTES
[facility]    Medicine Progress Note - Hospitalist Service    Date of Admission:  10/21/2023    Assessment & Plan      Ana Bunn is a 62 year old female with PMHx significant for HTN, CKD3, DM2, GERD, mild TBI, and anemia who presented with 2 days of acute back pain.     Lumbar septic arthritis  -Denies injury/trauma recently or in the past, had a fall 1 year ago and broke arm, but not back injury  -WBC WNL, CRP 62.5  -Lumbar MRI shows edema around L3/L4 and L4/L5 facet joints with extension dorsally and appear consistent with septic arthritis at L3-L4 and L4-L5 with extension into the dorsal epidural space with early dorsal epidural phlegmon in those areas.    -Neurosurgery consulted  -ID consulted: Starting vancomycin and Rocephin now the aspiration is complete, concern for recurrent UTIs  -IR consult for aspiration today 10/22, Culture pending  -Pain management    Insulin-dependent type 2 diabetes:   -Is on 12 to 13 units of glargine at night.  will start with 6 units until able to eat  -Diabetic order set initiated.    Essential hypertension  -Likely elevated above baseline due to pain but still very high  -Does not appear to be on medications outpatient  -Given decreased kidney function, will start with amlodipine 5 mg  -Once VIKTOR resolves should discuss adding ACE or ARB with PCP given CKD    VIKTOR on CKD stage 3b  -Baseline over the last year appears to fluctuate between 1.4 and 2.2.  -On admission 1.63 with increased to 1.9  -We will monitor and avoid nephrotoxic agents as possible    Recently diagnosed UTI? Versus pyuria: UA in the clinic this week showed pyuria and she was started on Keflex.  Unable to see urine culture.  Antibiotics as above  Urology consulted        Diet: NPO for Medical/Clinical Reasons Except for: No Exceptions    DVT Prophylaxis: Pneumatic Compression Devices  Rg Catheter: Not present  Lines: None     Cardiac Monitoring: None  Code Status: Full Code   "    Clinically Significant Risk Factors Present on Admission                  # Hypertension: Noted on problem list     # DMII: A1C = 8.3 % (Ref range: <5.7 %) within past 6 months   # Overweight: Estimated body mass index is 29.63 kg/m  as calculated from the following:    Height as of this encounter: 1.575 m (5' 2\").    Weight as of this encounter: 73.5 kg (162 lb).              Disposition Plan      Expected Discharge Date: 10/23/2023                    Mary Meraz MD  Hospitalist Service  [facility]  Securely message with [employer] (more info)  Text page via [facility] Paging/Directory   ______________________________________________________________________    Interval History   Mrs. Bunn is in pain this morning. She was up in the bathroom and seemed a little unstable on her feet trying to get back to bed due to the pain. She had two daughters at bedside. She will go for IR aspiration this morning Will be bale to start antibiotics after that. She had been on antibiotics prior to admission for UTI which are currently held. She denies injury of back past or recent. She hasn't had other infections except the uti recently. She did have an accident about a year ago and broke her arm but denies back injury.     Physical Exam   Vital Signs: Temp: 98  F (36.7  C) Temp src: Oral BP: (!) 197/97 Pulse: 92   Resp: 18 SpO2: 94 % O2 Device: Nasal cannula    Weight: 162 lbs 0 oz    General Appearance: Awake, alert, in mild distress from pain  Respiratory: CTAB, no wheeze  Cardiovascular: tachycardia  GI: soft, nontender, non distended, normal bowel sounds  Skin: no jaundice, no rash      Medical Decision Making       50 MINUTES SPENT BY ME on the date of service doing chart review, history, exam, documentation & further activities per the note.      Data     I have personally reviewed the following data over the past 24 hrs:    5.6  \   11.4 (L)   / 163     137 104 22.4 /  151 (H)   4.1 26 1.90 (H) " \     TSH: N/A T4: N/A A1C: 8.3 (H)     Procal: N/A CRP: 124.00 (H) Lactic Acid: N/A         Imaging results reviewed over the past 24 hrs:   Recent Results (from the past 24 hour(s))   Lumbar spine CT w/o contrast    Narrative    EXAM: CT LUMBAR SPINE W/O CONTRAST  LOCATION: [facility]  DATE: 10/21/2023    INDICATION: Mid lumbar point tenderness  COMPARISON: None.  TECHNIQUE: Routine CT Lumbar Spine without IV contrast. Multiplanar reformats. Dose reduction techniques were used.     FINDINGS:  VERTEBRA: Transitional L5 vertebral body. Normal vertebral body heights and alignment. No fracture or posttraumatic subluxation.     CANAL/FORAMINA: Moderate left L4-L5 lateral recess and neural foraminal stenoses.    PARASPINAL: No extraspinal abnormality.      Impression    IMPRESSION:  1.  Transitional L5 vertebral body.  2.  No fracture or posttraumatic subluxation.  3.  Moderate left L4-L5 lateral recess and neural foraminal stenoses.   MR Lumbar Spine w/o & w Contrast    Addendum: 10/21/2023    Addendum/  impression:    Dr. Cao discussed the results with Dr. Houser on 10/21/2023 4:15 PM CDT by telephone.      Narrative    EXAM: MR LUMBAR SPINE W/O and W CONTRAST  LOCATION: [facility]  DATE: 10/21/2023    INDICATION: low point tenderness in lumbar spine. Not radicular. High CRP. Concern for infectious process  COMPARISON: Lumbar spine CT 10/21/2023.  CONTRAST: 7ml Gadavist  TECHNIQUE: Routine Lumbar Spine MRI without and with IV contrast.    FINDINGS:   Transitional vertebral anatomy with with a partially sacralized L5 vertebral body.  Careful attention to the numbering convention is recommended prior to any future percutaneous or surgical intervention. Anterolisthesis of L4 and L5 measuring 7 mm.   Anterior marginal osteophytes at L1/L2 - L4/L5. The vertebral bodies of the lumbar spine otherwise have normal stature, alignment, and marrow signal intensity. Normal  distal spinal cord and cauda equina with conus medullaris at the partially imaged   intra-abdominal contents are unremarkable. The conus terminates at the inferior plate of L1. The partially imaged intracranial contents are unremarkable. Edema within the left paraspinal musculature at L3/L4 and L4/L5, consistent with myositis.   Unremarkable visualized bony pelvis.    T12-L1: Normal disc signal and disc height. No posterior disc bulge or spinal canal narrowing. No neural foraminal narrowing.     L1-L2: Normal disc signal and disc height. No posterior disc bulge or spinal canal narrowing. Mild bilateral facet arthropathy. No neural foraminal narrowing.    L2-L3: Normal disc signal and disc height. No posterior disc bulge or spinal canal narrowing. No neural foraminal narrowing.     L3-L4: Edema, and enhancement is demonstrated centered on the L3/L4 and L4/L5 facet joints which extend into the dorsal aspect of the spinal canal with solid enhancement. Overall constellation of findings are consistent with septic arthritis at L3/L4 and   L4/L5 with extension into the dorsal epidural space with early dorsal epidural phlegmon at L3/L4 and L4/L5. Moderate spinal canal narrowing. Mild bilateral neural foraminal narrowing.    L4-L5: Mild loss of disc signal and disc height. Symmetric disc bulge and moderate facet arthropathy moderate spinal spinal canal narrowing. Symmetric disc bulge without significant spinal canal narrowing. Moderate bilateral neural foraminal narrowing.    L5-S1: No posterior disc bulge or spinal canal narrowing. No neural foraminal narrowing.      Impression    IMPRESSION:  1.  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  Careful attention to the numbering convention is recommended prior to any future percutaneous or surgical intervention.   2.  Edema, and enhancement is demonstrated centered on the L3/L4 and L4/L5 facet joints which extend into the dorsal aspect of  the spinal canal with solid enhancement. Overall constellation of findings are consistent with septic arthritis at L3/L4 and   L4/L5 with extension into the dorsal epidural space with early dorsal epidural phlegmon at L3/L4 and L4/L5. Moderate spinal canal narrowing. Moderate spinal canal narrowing at the L3/L4 and L4/L5 levels.  3.  Edema within the left paraspinal musculature at L3/L4 and L4/L5, consistent with myositis.

## 2023-10-22 NOTE — CONSULTS
Care Management Initial Consult    General Information  Assessment completed with: Rey Ana  Type of CM/SW Visit: Initial Assessment    Primary Care Provider verified and updated as needed: Yes ([facility])   Readmission within the last 30 days: no previous admission in last 30 days         Advance Care Planning: Advance Care Planning Reviewed: other (see comments) (no acp docs)          Communication Assessment  Patient's communication style: spoken language (English or Bilingual)    Hearing Difficulty or Deaf: no        Cognitive  Cognitive/Neuro/Behavioral: WDL                      Living Environment:   People in home: alone     Current living Arrangements: house      Able to return to prior arrangements: yes       Family/Social Support:  Care provided by: self  Provides care for: no one  Marital Status:   Sibling(s)          Description of Support System: Supportive         Current Resources:   Patient receiving home care services: No     Community Resources: None  Equipment currently used at home:    Supplies currently used at home: None    Employment/Financial:  Employment Status: retired, employed part-time        Financial Concerns:             Does the patient's insurance plan have a 3 day qualifying hospital stay waiver?  No    Lifestyle & Psychosocial Needs:  Social Determinants of Health     Food Insecurity: Not on file   Depression: Not on file   Housing Stability: Not on file   Tobacco Use: High Risk (10/22/2023)    Patient History     Smoking Tobacco Use: Every Day     Smokeless Tobacco Use: Never     Passive Exposure: Not on file   Financial Resource Strain: Not on file   Alcohol Use: Not on file   Transportation Needs: Not on file   Physical Activity: Not on file   Interpersonal Safety: Not on file   Stress: Not on file   Social Connections: Not on file       Functional Status:  Prior to admission patient needed assistance:   Dependent ADLs:: Independent  Dependent IADLs::  "Independent       Mental Health Status:          Chemical Dependency Status:                Values/Beliefs:  Spiritual, Cultural Beliefs, Sabianism Practices, Values that affect care:                 Additional Information:  Assessed. RNCM introduced self and CM role. Pt lives in a home alone usually, but her grandson Zachariah is currently staying with pt. Patient is Ind at baseline with all ADLs, and IADLs. Pt is retired, but does work part time. Pt drives. Sees PCP Dr. Holden at [facility]. No svcs prior. No mobility aides. Sister Madelin was at bedside as well, and is supportive and lives close to pt. Family to transport.       ID following pt per note 10/22 :  \"PLAN:  Bx done  Start vanco and rocephin  Follow results  Would strongly consider urology consult tomorrow, why constant UTIs, is there now a fistula, should she be scoped etc etc.  \"      RNCM to follow for IV abx plan.       No therapy consults placed at this time.       Cm will continue to follow plan of care, review recommendations, and assist with any discharge needs anticipated.     Nicole Helton RN      "

## 2023-10-23 ENCOUNTER — APPOINTMENT (OUTPATIENT)
Dept: CT IMAGING | Facility: HOSPITAL | Age: 63
DRG: 552 | End: 2023-10-23
Attending: NURSE PRACTITIONER
Payer: COMMERCIAL

## 2023-10-23 ENCOUNTER — HOME INFUSION (PRE-WILLOW HOME INFUSION) (OUTPATIENT)
Dept: PHARMACY | Facility: CLINIC | Age: 63
End: 2023-10-23
Payer: COMMERCIAL

## 2023-10-23 LAB
ANION GAP SERPL CALCULATED.3IONS-SCNC: 8 MMOL/L (ref 7–15)
BASO+EOS+MONOS # BLD AUTO: ABNORMAL 10*3/UL
BASO+EOS+MONOS NFR BLD AUTO: ABNORMAL %
BASOPHILS # BLD AUTO: 0 10E3/UL (ref 0–0.2)
BASOPHILS NFR BLD AUTO: 0 %
BUN SERPL-MCNC: 15.9 MG/DL (ref 8–23)
CALCIUM SERPL-MCNC: 8.7 MG/DL (ref 8.8–10.2)
CHLORIDE SERPL-SCNC: 107 MMOL/L (ref 98–107)
CREAT SERPL-MCNC: 1.56 MG/DL (ref 0.51–0.95)
DEPRECATED HCO3 PLAS-SCNC: 25 MMOL/L (ref 22–29)
EGFRCR SERPLBLD CKD-EPI 2021: 37 ML/MIN/1.73M2
EOSINOPHIL # BLD AUTO: 0.2 10E3/UL (ref 0–0.7)
EOSINOPHIL NFR BLD AUTO: 3 %
ERYTHROCYTE [DISTWIDTH] IN BLOOD BY AUTOMATED COUNT: 12.6 % (ref 10–15)
GLUCOSE BLDC GLUCOMTR-MCNC: 107 MG/DL (ref 70–99)
GLUCOSE BLDC GLUCOMTR-MCNC: 114 MG/DL (ref 70–99)
GLUCOSE BLDC GLUCOMTR-MCNC: 139 MG/DL (ref 70–99)
GLUCOSE BLDC GLUCOMTR-MCNC: 142 MG/DL (ref 70–99)
GLUCOSE BLDC GLUCOMTR-MCNC: 179 MG/DL (ref 70–99)
GLUCOSE SERPL-MCNC: 156 MG/DL (ref 70–99)
HCT VFR BLD AUTO: 34.5 % (ref 35–47)
HGB BLD-MCNC: 11.6 G/DL (ref 11.7–15.7)
IMM GRANULOCYTES # BLD: 0 10E3/UL
IMM GRANULOCYTES NFR BLD: 1 %
LYMPHOCYTES # BLD AUTO: 1.4 10E3/UL (ref 0.8–5.3)
LYMPHOCYTES NFR BLD AUTO: 23 %
MCH RBC QN AUTO: 32.1 PG (ref 26.5–33)
MCHC RBC AUTO-ENTMCNC: 33.6 G/DL (ref 31.5–36.5)
MCV RBC AUTO: 96 FL (ref 78–100)
MONOCYTES # BLD AUTO: 0.3 10E3/UL (ref 0–1.3)
MONOCYTES NFR BLD AUTO: 6 %
NEUTROPHILS # BLD AUTO: 4 10E3/UL (ref 1.6–8.3)
NEUTROPHILS NFR BLD AUTO: 67 %
NRBC # BLD AUTO: 0 10E3/UL
NRBC BLD AUTO-RTO: 0 /100
PLATELET # BLD AUTO: 187 10E3/UL (ref 150–450)
POTASSIUM SERPL-SCNC: 4.4 MMOL/L (ref 3.4–5.3)
RBC # BLD AUTO: 3.61 10E6/UL (ref 3.8–5.2)
SODIUM SERPL-SCNC: 140 MMOL/L (ref 135–145)
WBC # BLD AUTO: 6 10E3/UL (ref 4–11)

## 2023-10-23 PROCEDURE — 250N000009 HC RX 250: Performed by: INTERNAL MEDICINE

## 2023-10-23 PROCEDURE — 36415 COLL VENOUS BLD VENIPUNCTURE: CPT | Performed by: INTERNAL MEDICINE

## 2023-10-23 PROCEDURE — 80048 BASIC METABOLIC PNL TOTAL CA: CPT | Performed by: INTERNAL MEDICINE

## 2023-10-23 PROCEDURE — 250N000013 HC RX MED GY IP 250 OP 250 PS 637: Performed by: EMERGENCY MEDICINE

## 2023-10-23 PROCEDURE — 99233 SBSQ HOSP IP/OBS HIGH 50: CPT | Performed by: INTERNAL MEDICINE

## 2023-10-23 PROCEDURE — 250N000011 HC RX IP 250 OP 636: Performed by: EMERGENCY MEDICINE

## 2023-10-23 PROCEDURE — 250N000013 HC RX MED GY IP 250 OP 250 PS 637: Performed by: INTERNAL MEDICINE

## 2023-10-23 PROCEDURE — 250N000011 HC RX IP 250 OP 636: Mod: JZ | Performed by: INTERNAL MEDICINE

## 2023-10-23 PROCEDURE — 36569 INSJ PICC 5 YR+ W/O IMAGING: CPT

## 2023-10-23 PROCEDURE — 72194 CT PELVIS W/O & W/DYE: CPT

## 2023-10-23 PROCEDURE — 272N000450 HC KIT 4FR POWER PICC SINGLE LUMEN

## 2023-10-23 PROCEDURE — 99232 SBSQ HOSP IP/OBS MODERATE 35: CPT | Performed by: INTERNAL MEDICINE

## 2023-10-23 PROCEDURE — 120N000001 HC R&B MED SURG/OB

## 2023-10-23 PROCEDURE — 85025 COMPLETE CBC W/AUTO DIFF WBC: CPT | Performed by: INTERNAL MEDICINE

## 2023-10-23 RX ORDER — AMLODIPINE BESYLATE 5 MG/1
5 TABLET ORAL DAILY
Status: DISCONTINUED | OUTPATIENT
Start: 2023-10-23 | End: 2023-10-27 | Stop reason: HOSPADM

## 2023-10-23 RX ORDER — IOPAMIDOL 755 MG/ML
20 INJECTION, SOLUTION INTRAVASCULAR ONCE
Status: COMPLETED | OUTPATIENT
Start: 2023-10-23 | End: 2023-10-23

## 2023-10-23 RX ORDER — LIDOCAINE 40 MG/G
CREAM TOPICAL
Status: ACTIVE | OUTPATIENT
Start: 2023-10-23 | End: 2023-10-26

## 2023-10-23 RX ADMIN — MORPHINE SULFATE 4 MG: 4 INJECTION, SOLUTION INTRAMUSCULAR; INTRAVENOUS at 17:37

## 2023-10-23 RX ADMIN — MORPHINE SULFATE 4 MG: 4 INJECTION, SOLUTION INTRAMUSCULAR; INTRAVENOUS at 11:12

## 2023-10-23 RX ADMIN — MORPHINE SULFATE 4 MG: 4 INJECTION, SOLUTION INTRAMUSCULAR; INTRAVENOUS at 14:50

## 2023-10-23 RX ADMIN — AMLODIPINE BESYLATE 5 MG: 5 TABLET ORAL at 08:13

## 2023-10-23 RX ADMIN — THERA TABS 1 TABLET: TAB at 08:13

## 2023-10-23 RX ADMIN — MORPHINE SULFATE 4 MG: 4 INJECTION, SOLUTION INTRAMUSCULAR; INTRAVENOUS at 00:15

## 2023-10-23 RX ADMIN — CEFTRIAXONE SODIUM 2 G: 2 INJECTION, POWDER, FOR SOLUTION INTRAMUSCULAR; INTRAVENOUS at 12:20

## 2023-10-23 RX ADMIN — Medication 25 MCG: at 08:13

## 2023-10-23 RX ADMIN — MORPHINE SULFATE 4 MG: 4 INJECTION, SOLUTION INTRAMUSCULAR; INTRAVENOUS at 22:16

## 2023-10-23 RX ADMIN — POTASSIUM CHLORIDE AND SODIUM CHLORIDE: 900; 150 INJECTION, SOLUTION INTRAVENOUS at 11:17

## 2023-10-23 RX ADMIN — IOPAMIDOL 20 ML: 755 INJECTION, SOLUTION INTRAVENOUS at 21:20

## 2023-10-23 RX ADMIN — MORPHINE SULFATE 4 MG: 4 INJECTION, SOLUTION INTRAMUSCULAR; INTRAVENOUS at 05:35

## 2023-10-23 RX ADMIN — LIDOCAINE HYDROCHLORIDE 3 ML: 10 INJECTION, SOLUTION EPIDURAL; INFILTRATION; INTRACAUDAL; PERINEURAL at 10:51

## 2023-10-23 RX ADMIN — INSULIN GLARGINE 6 UNITS: 100 INJECTION, SOLUTION SUBCUTANEOUS at 22:16

## 2023-10-23 RX ADMIN — THIAMINE HCL TAB 100 MG 100 MG: 100 TAB at 08:13

## 2023-10-23 RX ADMIN — VANCOMYCIN HYDROCHLORIDE 1000 MG: 1 INJECTION, SOLUTION INTRAVENOUS at 12:26

## 2023-10-23 RX ADMIN — POTASSIUM CHLORIDE AND SODIUM CHLORIDE: 900; 150 INJECTION, SOLUTION INTRAVENOUS at 00:25

## 2023-10-23 RX ADMIN — HYDRALAZINE HYDROCHLORIDE 10 MG: 20 INJECTION INTRAMUSCULAR; INTRAVENOUS at 00:29

## 2023-10-23 ASSESSMENT — ACTIVITIES OF DAILY LIVING (ADL)
ADLS_ACUITY_SCORE: 36

## 2023-10-23 NOTE — PROGRESS NOTES
Blood pressure elevated 199/86. Having low back pain related to septic arthritis and ambulation to bathroom. PRN IV Morphine given. BP rechecked 188/91. PRN IV hydralazine 10 mg administered; /74 with recheck. Running continuous IVF NS + KCl 20 mEQ @ 100 ml/hr.

## 2023-10-23 NOTE — PROGRESS NOTES
[facility]    Medicine Progress Note - Hospitalist Service    Date of Admission:  10/21/2023    Assessment & Plan   Ana Bunn is a 62 year old female with PMHx significant for HTN, CKD3, DM2, GERD, mild TBI, and anemia who presented with 2 days of acute back pain.     Lumbar spine septic arthritis  Acute non-traumatic back pain- better controlled 10/23  -Denies injury/trauma recently or in the past, had a fall 1 year ago and broke arm, but not back injury  -on admission WBC WNL, CRP 62.5  -Lumbar MRI shows edema around L3/L4 and L4/L5 facet joints with extension dorsally and appear consistent with septic arthritis at L3-L4 and L4-L5 with extension into the dorsal epidural space with early dorsal epidural phlegmon in those areas.    -Neurosurgery consulted, no drainable abscess, have signed off  -ID consulted  -Continue vancomycin and ceftriaxone  -IR consult for aspiration today 10/22, Culture pending, anaerobic culture transported incorrectly and may not be accurate  -Pain management  -PICC line placed 10/23    Insulin-dependent type 2 diabetes  -Hemoglobin A1c 10/21: 8.3%  -Is on 12 to 13 units of glargine at night  -Continue Lantus 6 units at bedtime  -Sliding scale insulin with meals and at bedtime  -Hypoglycemic protocol in place    Essential hypertension  -Does not appear to be on medications outpatient  -Continue amlodipine 5 mg  -Once VIKTOR resolves should discuss adding/changing to ACE or ARB with PCP    VIKTOR (resolved) on CKD stage 3b  -Baseline over the last year appears to fluctuate between 1.4 and 2.2.  -On admission 1.63 with increased to 1.9 10/22, now improving  -We will monitor and avoid nephrotoxic agents as possible  -on IVF until CT complete    History of Recurrent UTI  -Recently diagnosed UTI, UA in the clinic this week showed pyuria and she was started on Keflex.  Unable to see urine culture.  Was started on keflex which has been held  -Has had 6 episodes of dysuria  "in the past year with 5 of those episodes positive for E. coli on urine culture  -Urology consulted, appreciate assistance: Planning for CT urogram and CT non-con        Diet: Moderate Consistent Carb (60 g CHO per Meal) Diet    DVT Prophylaxis: Pneumatic Compression Devices  Rg Catheter: Not present  Lines: PRESENT      PICC 10/23/23 Single Lumen Right Basilic antibiotics-Site Assessment: WDL      Cardiac Monitoring: None  Code Status: Full Code      Clinically Significant Risk Factors                  # Hypertension: Noted on problem list       # DMII: A1C = 8.3 % (Ref range: <5.7 %) within past 6 months   # Overweight: Estimated body mass index is 29.63 kg/m  as calculated from the following:    Height as of this encounter: 1.575 m (5' 2\").    Weight as of this encounter: 73.5 kg (162 lb)., PRESENT ON ADMISSION            Disposition Plan      Expected Discharge Date: 10/24/2023    Discharge Delays: IV Medication - consider oral or Home Infusion  Destination: home              Mary Meraz MD  Hospitalist Service  [facility]  Securely message with [employer] (more info)  Text page via [employer] Paging/Directory   ______________________________________________________________________    Interval History   Mrs. Bunn is feeling better today. Her pain is better managed. She welcomes urology consult. She has had multiple UTIs this year and cultures show due to E. Coli. Will have CT urogram and CT non con to eval uro system. Discussed with urology. Discussed Iv infusion with .     Physical Exam   Vital Signs: Temp: 98.6  F (37  C) Temp src: Oral BP: (!) 168/85 Pulse: 92   Resp: 18 SpO2: 97 % O2 Device: None (Room air)    Weight: 162 lbs 0 oz    General Appearance: Awake, alert, in no acute distress  Respiratory: CTAB, no wheeze  Cardiovascular: RRR, no murmur noted  GI: soft, nontender, non distended, normal bowel sounds  Skin: no jaundice, no rash      Medical Decision Making       60 " MINUTES SPENT BY ME on the date of service doing chart review, history, exam, documentation & further activities per the note.      Data     I have personally reviewed the following data over the past 24 hrs:    6.0  \   11.6 (L)   / 187     140 107 15.9 /  139 (H)   4.4 25 1.56 (H) \       Imaging results reviewed over the past 24 hrs:   No results found for this or any previous visit (from the past 24 hour(s)).

## 2023-10-23 NOTE — PROGRESS NOTES
"[place] [facility] Progress Note    SUBJECTIVE:  pain with any movement.  Bx with white cells thus far.        REVIEW OF SYSTEMS:  Negative unless as listed above.  Social history, Family history, Medications: reviewed.    OBJECTIVE:  BP (!) 191/87 (BP Location: Left arm)   Pulse 81   Temp 98.3  F (36.8  C) (Oral)   Resp 18   Ht 1.575 m (5' 2\")   Wt 73.5 kg (162 lb)   SpO2 99%   BMI 29.63 kg/m                PHYSICAL EXAM:  Alert, awake  Vitals tabulated above, reviewed  Neck supple without lymphadenopathy  Sclera normal color, not injected  CARDIOVASCULAR regular rate and rhythm, no murmur  Lungs CLEAR TO AUSCULTATION   Abdomen soft, NT/ND, absent HEPATOSPLENOMEGALY  Skin normal  Joints normal  Neurologic exam axial pain, as would expect  Antibiotics:    Pertinent labs:  Lab Results   Component Value Date    WBC 5.6 10/22/2023     Lab Results   Component Value Date    RBC 3.61 10/22/2023     Lab Results   Component Value Date    HGB 11.4 10/22/2023     Lab Results   Component Value Date    HCT 34.7 10/22/2023     No components found for: \"MCT\"  Lab Results   Component Value Date    MCV 96 10/22/2023     Lab Results   Component Value Date    MCH 31.6 10/22/2023     Lab Results   Component Value Date    MCHC 32.9 10/22/2023     Lab Results   Component Value Date    RDW 12.6 10/22/2023     Lab Results   Component Value Date     10/22/2023       Last Comprehensive Metabolic Panel:  Sodium   Date Value Ref Range Status   10/22/2023 137 135 - 145 mmol/L Final     Comment:     Reference intervals for this test were updated on 09/26/2023 to more accurately reflect our healthy population. There may be differences in the flagging of prior results with similar values performed with this method. Interpretation of those prior results can be made in the context of the updated reference intervals.      Potassium   Date Value Ref Range Status   10/22/2023 4.1 3.4 - 5.3 mmol/L Final   04/19/2022 4.0 3.5 - 5.0 " "mmol/L Final     Chloride   Date Value Ref Range Status   10/22/2023 104 98 - 107 mmol/L Final   04/19/2022 99 98 - 107 mmol/L Final     Carbon Dioxide (CO2)   Date Value Ref Range Status   10/22/2023 26 22 - 29 mmol/L Final   04/19/2022 22 22 - 31 mmol/L Final     Anion Gap   Date Value Ref Range Status   10/22/2023 7 7 - 15 mmol/L Final   04/19/2022 11 5 - 18 mmol/L Final     Glucose   Date Value Ref Range Status   04/19/2022 145 (H) 70 - 125 mg/dL Final     GLUCOSE BY METER POCT   Date Value Ref Range Status   10/23/2023 114 (H) 70 - 99 mg/dL Final     Urea Nitrogen   Date Value Ref Range Status   10/22/2023 22.4 8.0 - 23.0 mg/dL Final   04/19/2022 17 8 - 22 mg/dL Final     Creatinine   Date Value Ref Range Status   10/22/2023 1.90 (H) 0.51 - 0.95 mg/dL Final     GFR Estimate   Date Value Ref Range Status   10/22/2023 29 (L) >60 mL/min/1.73m2 Final     Calcium   Date Value Ref Range Status   10/22/2023 8.8 8.8 - 10.2 mg/dL Final       Liver Function Studies -   Recent Labs   Lab Test 02/07/22  0830   PROTTOTAL 6.6   ALBUMIN 3.5   BILITOTAL 0.2   ALKPHOS 61   AST 20   ALT 20       No results found for: \"SED\"    CRP   Date Value Ref Range Status   02/07/2022 0.5 0.0-<0.8 mg/dL Final               MICROBIOLOGY DATA:    Bx micro pending    IMAGING/RADIOLOGY:          ASSESSMENT:  Discitis/facet arthritis    RECOMMENDATION:  Follow results  PICC   Vanco and ctx   Urologic julián Luther MD  Office [phone] option 2 to desk staff  "

## 2023-10-23 NOTE — PROCEDURES
"PICC Line Insertion Procedure Note  Pt. Name: Ana Bunn  MRN:        [id number]    Procedure: Insertion of a single Lumen  4 fr  Bard SOLO (valved) Power PICC, Lot number [id number]    Indications: Antibiotics    Contraindications : None noted in H&P    Procedure Details     Patient identified with 2 identifiers and \"Time Out\" conducted.  .     Central line insertion bundle followed: hand hygiene performed prior to procedure, site cleansed with cholraprep, hat, mask, sterile gloves, sterile gown worn, patient draped with maximum barrier head to toe drape, sterile field maintained.    The vein was assessed and found to be compressible and of adequate size.     Lidocaine 1% 3 ml administered sq to the insertion site. A 4 Fr PICC was inserted into the basilic vein of the right arm with ultrasound guidance. 1 attempt(s) required to access vein.   Catheter threaded without difficulty. Good blood return noted.    Modified Seldinger Technique used for insertion.    The 8 sharps that are included in the PICC insertion kit were accounted for and disposed of in the sharps container prior to breakdown of the sterile field.    Catheter secured with Statlock, biopatch and Tegaderm dressing applied.    Findings:    Total catheter length  39 cm, with 0 cm exposed. Mid upper arm circumference is 0 cm. Catheter was flushed with 20 cc NS. Patient  tolerated procedure well.    Tip placement verified by 3CG. Tip placement in the SVC.      CLABSI prevention brochure left at bedside.    Patient's primary RN notified PICC is ready for use.      Comments:        Holly Spaulding RN, BSN  Vascular Access - [facility]    Procedures    "

## 2023-10-23 NOTE — PLAN OF CARE
Problem: Adult Inpatient Plan of Care  Goal: Absence of Hospital-Acquired Illness or Injury  Intervention: Prevent Infection  Recent Flowsheet Documentation  Taken 10/23/2023 0015 by Katiuska Rivera RN  Infection Prevention:   rest/sleep promoted   hand hygiene promoted  Goal: Optimal Comfort and Wellbeing  Intervention: Monitor Pain and Promote Comfort  Recent Flowsheet Documentation  Taken 10/23/2023 0535 by Katiuska Rivera RN  Pain Management Interventions: medication (see MAR)  Taken 10/23/2023 0015 by Katiuska Rivera RN  Pain Management Interventions: medication (see MAR)     Problem: Mobility Impairment  Goal: Optimal Mobility  Outcome: Progressing   Goal Outcome Evaluation:    Septic arthritis of lumbar spine treated with IV Vancomycin and IV Rocephin. Lumbar pain managed with PRN IV Morphine x 2. Able to ambulate to bathroom with assist of 1. Urology consult ordered for frequent UTIs. Lumbar biopsy results pending. Blood culture results have no growth after 1 day.

## 2023-10-23 NOTE — PROGRESS NOTES
[facility]    Referral received from TATIANNA Cortez, for IV antibiotics.    Benefits verified. Pt has coverage for IV antibiotics under her [employer] plan.  Pt has met her $250 deductible.  She has 95/5 coverage until her out of pocket of $1700 is met (has met $679 so far).  Once met, she should have 100% coverage.    Should pt require IV antibiotics, writer will speak with pt to review benefits, home infusion and to offer choice of agency/home infusion provider.    Thank you for the referral.    Evelyn Lam, RN, BSN  [facility] Liaison  [phone] (Mon through Fri, 8:00 am-5:00 pm)  [phone] (Office)

## 2023-10-23 NOTE — PLAN OF CARE
Problem: Adult Inpatient Plan of Care  Goal: Absence of Hospital-Acquired Illness or Injury  Intervention: Identify and Manage Fall Risk  Recent Flowsheet Documentation  Taken 10/23/2023 0810 by Asha Maddox RN  Safety Promotion/Fall Prevention: activity supervised     Problem: Adult Inpatient Plan of Care  Goal: Absence of Hospital-Acquired Illness or Injury  Intervention: Prevent Infection  Recent Flowsheet Documentation  Taken 10/23/2023 0810 by Asha Maddox RN  Infection Prevention:   hand hygiene promoted   rest/sleep promoted     Problem: Pain Acute  Goal: Optimal Pain Control and Function  Intervention: Prevent or Manage Pain  Recent Flowsheet Documentation  Taken 10/23/2023 0810 by Asha Maddox RN  Medication Review/Management: medications reviewed     Problem: Mobility Impairment  Goal: Optimal Mobility  Intervention: Optimize Mobility  Recent Flowsheet Documentation  Taken 10/23/2023 1123 by Asha Maddox RN  Activity Management: ambulated to bathroom  Taken 10/23/2023 0810 by Asha Maddox RN  Activity Management: activity adjusted per tolerance  Positioning/Transfer Devices:   pillows   in use   Goal Outcome Evaluation:       Patient continue to rate pain about 7-8/10, prn morphine given with relief. She report the pain is worse with movement and when she get up to use the bathroom. Blood sugars 114 and 139. Now have single lumen picc line.

## 2023-10-23 NOTE — CONSULTS
[facility] CONSULTATION     Type of Consult: inpatient  Place of Service: [facility]  Reason for consult: Recurrent UTI in setting of acute onset lumbar osteomyelitis   Request for consult by: Dr. Meraz    History of present illness:   Ana Bunn is a 62 year old female with hx of mild traumatic brain injury, GERD, DM-II, HTN, HLD, CKD-III, recurrent E.coli UTI; Admitted to hospital 10/21 with Septic arthritis of lumbar spine (H24). Urology is being consulted for recurrent urinary tract infections (UTI) in setting of acute onset lumbar osteomyelitis. History obtained from patient and chart review.     Pt reports prior to 6/8/2020, she had never had a UTI. Then, was admitted to [facility] 6/8/-6/18/2020 with severe E.coli urosepsis with E.coli bacteremia and VIKTOR. ID consulted, initially treated with IV ceftriaxone and later changed to cipro prior to discharge.     Pt has experienced at least 4 symptomatic E.coli UTIs since (see Prior Urine Cultures below).     Pt recently saw her primary care provider, Dr. Tan with [facility], on 10/19 for diabetes follow up and mentioned recent symptoms of minor burning with urination, increased urinary frequency/urgency. UA+, UC: >100k E.coli.  Hgb A1C 8.0. Noted improvement after approx 24 hours on Keflex po.     Pt presented to the ED 10/21 with acute onset non-traumatic low back pain. CRP was elevated. MRI lumbar spine indicated L3-L4 septic arthritis. S/p L4-5 disc aspiration 10/22/23. Aerobic CX: NGTD, Gram stain -  2+WBC, no organisms seen. Anaerobic: NGTD.     Prior Urine Cultures:   10/19/23 (HP): >100k E.coli (I-cipro), started on Keflex 500 mg po BID x 7 days  8/1/23 (HP): 50-100k E.coli, 10-50k E.coli (I-cipro), started on Macrobid, changed to Keflex 500 mg po TID x 5 days d/t intolerance of Macrobid.   6/23/23 (HP): >100k E.coli (I-cipro), completed Bactrim BID x 5 days.   4/14/23 (FV): 50-100k E.coli (r-cipro, I-levaquin),  10-50k E.coli (I-levaquin), treated with Omnicef 300 mg po BID x 7 days.   4/10/23 (HP): 100k E.coli (I-cipro), >100k E.coli (r-Cipro, I-levaquin, r-Nitrofurantoin), started on Bactrim DS BID x 3 days.   2/7/22 (FV): >100k mix of urogenital leonor    Pt denies hx of kidney/bladder stones, masses, gross hematuria, fistulas.     Most common acute symptoms just prior to UTI diagnoses: increased urinary frequency, burning with urination, increased nocturia (normally voids 2xs/night, with UTI, up to 10xs/night).      Currently, denies bilateral flank pain, fever, chills, abdominal pain, burning with urination, odiferous urine. Reports urine is clear yellow. Is uncertain if she is fully emptying her bladder.       Past medical history:  History reviewed. No pertinent past medical history.    Past Surgical History:  Past Surgical History:   Procedure Laterality Date    IR DISC ASPIRATION INJECTION  10/22/2023       Social History:  Social History     Socioeconomic History    Marital status:      Spouse name: Not on file    Number of children: Not on file    Years of education: Not on file    Highest education level: Not on file   Occupational History    Not on file   Tobacco Use    Smoking status: Every Day     Packs/day: .5     Types: Cigarettes    Smokeless tobacco: Never   Substance and Sexual Activity    Alcohol use: Yes     Alcohol/week: 3.0 standard drinks of alcohol     Types: 3 Cans of beer per week     Comment: approx 3 beers/day    Drug use: Not on file    Sexual activity: Not on file   Other Topics Concern    Not on file   Social History Narrative    Not on file     Social Determinants of Health     Financial Resource Strain: Not on file   Food Insecurity: Not on file   Transportation Needs: Not on file   Physical Activity: Not on file   Stress: Not on file   Social Connections: Not on file   Interpersonal Safety: Not on file   Housing Stability: Not on file       Medications:  Current  "Facility-Administered Medications   Medication    0.9% sodium chloride + KCl 20 mEq/L infusion    acetaminophen (TYLENOL) tablet 975 mg    amLODIPine (NORVASC) tablet 5 mg    cefTRIAXone (ROCEPHIN) 2 g vial to attach to  ml bag for ADULTS or NS 50 ml bag for PEDS    glucose gel 15-30 g    Or    dextrose 50 % injection 25-50 mL    Or    glucagon injection 1 mg    hydrALAZINE (APRESOLINE) injection 10 mg    insulin aspart (NovoLOG) injection (RAPID ACTING)    insulin aspart (NovoLOG) injection (RAPID ACTING)    insulin glargine (LANTUS PEN) injection 6 Units    lidocaine (LMX4) cream    lidocaine 1 % 0.1-5 mL    melatonin tablet 1 mg    morphine (PF) injection 4 mg    multivitamin, therapeutic (THERA-VIT) tablet 1 tablet    naloxone (NARCAN) injection 0.2 mg    Or    naloxone (NARCAN) injection 0.4 mg    Or    naloxone (NARCAN) injection 0.2 mg    Or    naloxone (NARCAN) injection 0.4 mg    ondansetron (ZOFRAN ODT) ODT tab 4 mg    Or    ondansetron (ZOFRAN) injection 4 mg    sodium chloride (PF) 0.9% PF flush 10-20 mL    sodium chloride (PF) 0.9% PF flush 10-40 mL    sodium chloride (PF) 0.9% PF flush 10-40 mL    sodium chloride (PF) 0.9% PF flush 10-40 mL    thiamine (B-1) tablet 100 mg    vancomycin (VANCOCIN) 1,000 mg in 200 mL dextrose intermittent infusion    Vitamin D3 (CHOLECALCIFEROL) tablet 25 mcg       Allergies:   No Known Allergies    Review of systems:   A full 12 point review of systems was taken and is negative aside from what is noted above in the HPI.     Physical examination:  BP (!) 167/75 (BP Location: Left arm)   Pulse 90   Temp 98.1  F (36.7  C) (Oral)   Resp 18   Ht 1.575 m (5' 2\")   Wt 73.5 kg (162 lb)   SpO2 99%   BMI 29.63 kg/m    General: NAD, alert, cooperative  Head: normocephalic, without abnormality / atraumatic  Abdomen: soft, non-tender, non-distended. no suprapubic fullness noted, no suprapubic tenderness noted. No bilateral CVA tenderness noted.   Genitourinary: " deferred  Skin: no rashes or lesions  Musculoskeletal: moves all four extremities equally.   Psychological: alert and oriented, answers questions appropriately      Labs:   Lab Results   Component Value Date    WBC 6.0 10/23/2023    HGB 11.6 (L) 10/23/2023    HCT 34.5 (L) 10/23/2023     10/23/2023    ALT 20 02/07/2022    AST 20 02/07/2022     10/23/2023    BUN 15.9 10/23/2023    CO2 25 10/23/2023    TSH 1.27 02/07/2022    INR 0.97 04/19/2022     Creatinine   Date Value Ref Range Status   10/23/2023 1.56 (H) 0.51 - 0.95 mg/dL Final     Cultures:  Urine Cultures:  see above    Blood Cultures 10/21/23: no growth to date    Lab Results: personally reviewed     Imaging:  EXAM: MR LUMBAR SPINE W/O and W CONTRAST  LOCATION: [facility]  DATE: 10/21/2023     INDICATION: low point tenderness in lumbar spine. Not radicular. High CRP. Concern for infectious process  COMPARISON: Lumbar spine CT 10/21/2023.  CONTRAST: 7ml Gadavist  TECHNIQUE: Routine Lumbar Spine MRI without and with IV contrast.     FINDINGS:   Transitional vertebral anatomy with with a partially sacralized L5 vertebral body.  Careful attention to the numbering convention is recommended prior to any future percutaneous or surgical intervention. Anterolisthesis of L4 and L5 measuring 7 mm.   Anterior marginal osteophytes at L1/L2 - L4/L5. The vertebral bodies of the lumbar spine otherwise have normal stature, alignment, and marrow signal intensity. Normal distal spinal cord and cauda equina with conus medullaris at the partially imaged   intra-abdominal contents are unremarkable. The conus terminates at the inferior plate of L1. The partially imaged intracranial contents are unremarkable. Edema within the left paraspinal musculature at L3/L4 and L4/L5, consistent with myositis.   Unremarkable visualized bony pelvis.     T12-L1: Normal disc signal and disc height. No posterior disc bulge or spinal canal narrowing. No neural  foraminal narrowing.      L1-L2: Normal disc signal and disc height. No posterior disc bulge or spinal canal narrowing. Mild bilateral facet arthropathy. No neural foraminal narrowing.     L2-L3: Normal disc signal and disc height. No posterior disc bulge or spinal canal narrowing. No neural foraminal narrowing.      L3-L4: Edema, and enhancement is demonstrated centered on the L3/L4 and L4/L5 facet joints which extend into the dorsal aspect of the spinal canal with solid enhancement. Overall constellation of findings are consistent with septic arthritis at L3/L4 and   L4/L5 with extension into the dorsal epidural space with early dorsal epidural phlegmon at L3/L4 and L4/L5. Moderate spinal canal narrowing. Mild bilateral neural foraminal narrowing.     L4-L5: Mild loss of disc signal and disc height. Symmetric disc bulge and moderate facet arthropathy moderate spinal spinal canal narrowing. Symmetric disc bulge without significant spinal canal narrowing. Moderate bilateral neural foraminal narrowing.     L5-S1: No posterior disc bulge or spinal canal narrowing. No neural foraminal narrowing.                                                                      IMPRESSION:  1.  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  Careful attention to the numbering convention is recommended prior to any future percutaneous or surgical intervention.   2.  Edema, and enhancement is demonstrated centered on the L3/L4 and L4/L5 facet joints which extend into the dorsal aspect of the spinal canal with solid enhancement. Overall constellation of findings are consistent with septic arthritis at L3/L4 and   L4/L5 with extension into the dorsal epidural space with early dorsal epidural phlegmon at L3/L4 and L4/L5. Moderate spinal canal narrowing. Moderate spinal canal narrowing at the L3/L4 and L4/L5 levels.  3.  Edema within the left paraspinal musculature at L3/L4 and L4/L5, consistent with  myositis.     I have personally reviewed the imaging reports above.        Assessment / plan: Ana Bunn is being seen by [facility] for recurrent E.coli UTI in setting of acute onset lumbar osteomyelitis    -  62 yr old female with 4 symptomatic E.coli UTIs beginning 4/2023 and the last on 10/19/23 ([facility]). Now with acute onset lumbar osteomyelitis, s/p disc aspiration 10/22, cultures pending.   - Blood cultures: NGTD. Remains afebrile. WBC WNL. ID following, covering with IV ceftriaxone and IV Vanco. Recommend treating urine with 10-14 day antibiotic course, then consider low dose suppressive antibiotic x 3 months.    - Creatinine 1.56 (baseline 1.44-1.95).   - PVR checks ordered.   - Will check non-contrast CT abdomen/pelvis to evaluate for stones, obstruction, etc and CT cystogram to evaluate for possible fistula.   - Old records reviewed - [facility]  - Will continue to follow.     This case was discussed with:  Dr. Parra      Thank you for consulting [facility] regarding this patient's care. Please contact us with questions or concerns.       Osman Nuñez, APRN, CNP  [facility]   [phone]

## 2023-10-23 NOTE — PROGRESS NOTES
Therapy: IV ABX  Insurance: [email or web address]   Ded: $250  Met: $250    Co-Insurance: 95/5  Max Out of Pocket: $1750  Met: $679    In reference to referral from [facility] on pt admitted 10/21/23 to check for IV ABX coverage.    Please contact Intake with any questions, [phone] or In Basket pool, [facility] ([phone]).

## 2023-10-23 NOTE — PLAN OF CARE
Goal Outcome Evaluation:      Pt c/o of back pain 5/10. PRN q2 hr. IV morphine given bringing pts pain to a 3 to 4/10. NS w/ KCl 20 running cont. @ 100mL/hr. BG hs 204. Bedrest w/ BR privlages. A1 to SBA to the BR. A&O x4, pt runs hypertensive, on RA.      Problem: Adult Inpatient Plan of Care  Goal: Absence of Hospital-Acquired Illness or Injury  Intervention: Prevent Infection  Recent Flowsheet Documentation  Taken 10/22/2023 2155 by Sally Copeland, RN  Infection Prevention: rest/sleep promoted     Problem: Adult Inpatient Plan of Care  Goal: Absence of Hospital-Acquired Illness or Injury  Intervention: Identify and Manage Fall Risk  Recent Flowsheet Documentation  Taken 10/22/2023 2155 by Sally Copeland, RN  Safety Promotion/Fall Prevention: activity supervised     Problem: Adult Inpatient Plan of Care  Goal: Optimal Comfort and Wellbeing  Outcome: Progressing     Problem: Adult Inpatient Plan of Care  Goal: Readiness for Transition of Care  Outcome: Progressing       Plan of Care Reviewed With: patient    Overall Patient Progress: improvingOverall Patient Progress: improving

## 2023-10-24 LAB
ANION GAP SERPL CALCULATED.3IONS-SCNC: 10 MMOL/L (ref 7–15)
BUN SERPL-MCNC: 14.7 MG/DL (ref 8–23)
CALCIUM SERPL-MCNC: 9.1 MG/DL (ref 8.8–10.2)
CHLORIDE SERPL-SCNC: 102 MMOL/L (ref 98–107)
CREAT SERPL-MCNC: 1.5 MG/DL (ref 0.51–0.95)
DEPRECATED HCO3 PLAS-SCNC: 25 MMOL/L (ref 22–29)
EGFRCR SERPLBLD CKD-EPI 2021: 39 ML/MIN/1.73M2
ERYTHROCYTE [DISTWIDTH] IN BLOOD BY AUTOMATED COUNT: 12.6 % (ref 10–15)
GLUCOSE BLDC GLUCOMTR-MCNC: 106 MG/DL (ref 70–99)
GLUCOSE BLDC GLUCOMTR-MCNC: 163 MG/DL (ref 70–99)
GLUCOSE BLDC GLUCOMTR-MCNC: 169 MG/DL (ref 70–99)
GLUCOSE BLDC GLUCOMTR-MCNC: 231 MG/DL (ref 70–99)
GLUCOSE SERPL-MCNC: 105 MG/DL (ref 70–99)
HCT VFR BLD AUTO: 33.6 % (ref 35–47)
HGB BLD-MCNC: 11.3 G/DL (ref 11.7–15.7)
MCH RBC QN AUTO: 31.6 PG (ref 26.5–33)
MCHC RBC AUTO-ENTMCNC: 33.6 G/DL (ref 31.5–36.5)
MCV RBC AUTO: 94 FL (ref 78–100)
PLATELET # BLD AUTO: 198 10E3/UL (ref 150–450)
POTASSIUM SERPL-SCNC: 3.9 MMOL/L (ref 3.4–5.3)
RBC # BLD AUTO: 3.58 10E6/UL (ref 3.8–5.2)
SODIUM SERPL-SCNC: 137 MMOL/L (ref 135–145)
WBC # BLD AUTO: 4.7 10E3/UL (ref 4–11)

## 2023-10-24 PROCEDURE — 250N000013 HC RX MED GY IP 250 OP 250 PS 637: Performed by: EMERGENCY MEDICINE

## 2023-10-24 PROCEDURE — 250N000013 HC RX MED GY IP 250 OP 250 PS 637: Performed by: INTERNAL MEDICINE

## 2023-10-24 PROCEDURE — 99232 SBSQ HOSP IP/OBS MODERATE 35: CPT | Performed by: INTERNAL MEDICINE

## 2023-10-24 PROCEDURE — 250N000011 HC RX IP 250 OP 636: Performed by: EMERGENCY MEDICINE

## 2023-10-24 PROCEDURE — 80048 BASIC METABOLIC PNL TOTAL CA: CPT | Performed by: INTERNAL MEDICINE

## 2023-10-24 PROCEDURE — 250N000011 HC RX IP 250 OP 636: Mod: JZ | Performed by: INTERNAL MEDICINE

## 2023-10-24 PROCEDURE — 120N000001 HC R&B MED SURG/OB

## 2023-10-24 PROCEDURE — 85027 COMPLETE CBC AUTOMATED: CPT | Performed by: INTERNAL MEDICINE

## 2023-10-24 RX ORDER — MORPHINE SULFATE 2 MG/ML
2 INJECTION, SOLUTION INTRAMUSCULAR; INTRAVENOUS
Status: DISCONTINUED | OUTPATIENT
Start: 2023-10-24 | End: 2023-10-27

## 2023-10-24 RX ORDER — OXYCODONE HYDROCHLORIDE 5 MG/1
5 TABLET ORAL EVERY 4 HOURS PRN
Status: DISCONTINUED | OUTPATIENT
Start: 2023-10-24 | End: 2023-10-26

## 2023-10-24 RX ADMIN — CEFTRIAXONE SODIUM 2 G: 2 INJECTION, POWDER, FOR SOLUTION INTRAMUSCULAR; INTRAVENOUS at 08:01

## 2023-10-24 RX ADMIN — OXYCODONE HYDROCHLORIDE 5 MG: 5 TABLET ORAL at 09:09

## 2023-10-24 RX ADMIN — Medication 25 MCG: at 08:01

## 2023-10-24 RX ADMIN — MORPHINE SULFATE 2 MG: 2 INJECTION, SOLUTION INTRAMUSCULAR; INTRAVENOUS at 16:59

## 2023-10-24 RX ADMIN — INSULIN GLARGINE 6 UNITS: 100 INJECTION, SOLUTION SUBCUTANEOUS at 21:54

## 2023-10-24 RX ADMIN — THERA TABS 1 TABLET: TAB at 08:01

## 2023-10-24 RX ADMIN — MORPHINE SULFATE 4 MG: 4 INJECTION, SOLUTION INTRAMUSCULAR; INTRAVENOUS at 07:04

## 2023-10-24 RX ADMIN — MORPHINE SULFATE 4 MG: 4 INJECTION, SOLUTION INTRAMUSCULAR; INTRAVENOUS at 02:35

## 2023-10-24 RX ADMIN — THIAMINE HCL TAB 100 MG 100 MG: 100 TAB at 08:01

## 2023-10-24 RX ADMIN — VANCOMYCIN HYDROCHLORIDE 1000 MG: 1 INJECTION, SOLUTION INTRAVENOUS at 11:32

## 2023-10-24 RX ADMIN — MORPHINE SULFATE 2 MG: 2 INJECTION, SOLUTION INTRAMUSCULAR; INTRAVENOUS at 12:58

## 2023-10-24 RX ADMIN — MORPHINE SULFATE 2 MG: 2 INJECTION, SOLUTION INTRAMUSCULAR; INTRAVENOUS at 22:03

## 2023-10-24 RX ADMIN — AMLODIPINE BESYLATE 5 MG: 5 TABLET ORAL at 08:01

## 2023-10-24 ASSESSMENT — ACTIVITIES OF DAILY LIVING (ADL)
ADLS_ACUITY_SCORE: 36
ADLS_ACUITY_SCORE: 35
ADLS_ACUITY_SCORE: 36
ADLS_ACUITY_SCORE: 36
ADLS_ACUITY_SCORE: 35
ADLS_ACUITY_SCORE: 35
ADLS_ACUITY_SCORE: 36
ADLS_ACUITY_SCORE: 35

## 2023-10-24 NOTE — PROGRESS NOTES
"[facility] Infectious Disease Progress Note    SUBJECTIVE:  pain with any movement.  No new micro.  CT done.     REVIEW OF SYSTEMS:  Negative unless as listed above.  Social history, Family history, Medications: reviewed.    OBJECTIVE:  BP (!) 169/82 (BP Location: Left arm)   Pulse 79   Temp 98.1  F (36.7  C) (Oral)   Resp 18   Ht 1.575 m (5' 2\")   Wt 73.5 kg (162 lb)   SpO2 96%   BMI 29.63 kg/m                PHYSICAL EXAM:  Alert, awake  Vitals tabulated above, reviewed  Neck supple without lymphadenopathy  Sclera normal color, not injected  CARDIOVASCULAR regular rate and rhythm, no murmur  Lungs CLEAR TO AUSCULTATION   Abdomen soft, NT/ND, absent HEPATOSPLENOMEGALY  Skin normal  Joints normal  Neurologic exam axial pain, as would expect  Antibiotics:    Pertinent labs:  Lab Results   Component Value Date    WBC 5.6 10/22/2023     Lab Results   Component Value Date    RBC 3.61 10/22/2023     Lab Results   Component Value Date    HGB 11.4 10/22/2023     Lab Results   Component Value Date    HCT 34.7 10/22/2023     No components found for: \"MCT\"  Lab Results   Component Value Date    MCV 96 10/22/2023     Lab Results   Component Value Date    MCH 31.6 10/22/2023     Lab Results   Component Value Date    MCHC 32.9 10/22/2023     Lab Results   Component Value Date    RDW 12.6 10/22/2023     Lab Results   Component Value Date     10/22/2023       Last Comprehensive Metabolic Panel:  Sodium   Date Value Ref Range Status   10/24/2023 137 135 - 145 mmol/L Final     Comment:     Reference intervals for this test were updated on 09/26/2023 to more accurately reflect our healthy population. There may be differences in the flagging of prior results with similar values performed with this method. Interpretation of those prior results can be made in the context of the updated reference intervals.      Potassium   Date Value Ref Range Status   10/24/2023 3.9 3.4 - 5.3 mmol/L Final   04/19/2022 4.0 3.5 - 5.0 mmol/L " "Final     Chloride   Date Value Ref Range Status   10/24/2023 102 98 - 107 mmol/L Final   04/19/2022 99 98 - 107 mmol/L Final     Carbon Dioxide (CO2)   Date Value Ref Range Status   10/24/2023 25 22 - 29 mmol/L Final   04/19/2022 22 22 - 31 mmol/L Final     Anion Gap   Date Value Ref Range Status   10/24/2023 10 7 - 15 mmol/L Final   04/19/2022 11 5 - 18 mmol/L Final     Glucose   Date Value Ref Range Status   10/24/2023 105 (H) 70 - 99 mg/dL Final   04/19/2022 145 (H) 70 - 125 mg/dL Final     GLUCOSE BY METER POCT   Date Value Ref Range Status   10/24/2023 106 (H) 70 - 99 mg/dL Final     Urea Nitrogen   Date Value Ref Range Status   10/24/2023 14.7 8.0 - 23.0 mg/dL Final   04/19/2022 17 8 - 22 mg/dL Final     Creatinine   Date Value Ref Range Status   10/24/2023 1.50 (H) 0.51 - 0.95 mg/dL Final     GFR Estimate   Date Value Ref Range Status   10/24/2023 39 (L) >60 mL/min/1.73m2 Final     Calcium   Date Value Ref Range Status   10/24/2023 9.1 8.8 - 10.2 mg/dL Final       Liver Function Studies -   Recent Labs   Lab Test 02/07/22  0830   PROTTOTAL 6.6   ALBUMIN 3.5   BILITOTAL 0.2   ALKPHOS 61   AST 20   ALT 20       No results found for: \"SED\"    CRP   Date Value Ref Range Status   02/07/2022 0.5 0.0-<0.8 mg/dL Final               MICROBIOLOGY DATA:    Bx micro pending    IMAGING/RADIOLOGY:    FINDINGS:   BLADDER: No extravasated urinary bladder contrast identified. No definite communication/fistula with adjacent bowel or the vagina. No communication/fistula is seen with the uterus.     ADDITIONAL FINDINGS: No pelvic masses or adenopathy. Nonspecific bowel gas pattern. Vascular calcifications common iliac arteries. Appendix is visualized and appears within normal limits. No inguinal masses or adenopathy.                                                                      IMPRESSION:  1.  No evidence for urinary bladder fistulous communication. Please see above report.      ASSESSMENT:  Discitis/facet arthritis  No " clear urologic explanation by CT cysto  Recurrent UTIs since 2020    RECOMMENDATION:  Follow results  PICC in  Vanco and ctx   Urologic julián Luther MD  Office [phone] option 2 to desk staff

## 2023-10-24 NOTE — PROGRESS NOTES
[facility]    Medicine Progress Note - Hospitalist Service    Date of Admission:  10/21/2023    Assessment & Plan   Ana Bunn is a 62 year old female with PMHx significant for HTN, CKD3, DM2, GERD, mild TBI, and anemia who presented with 2 days of acute back pain.     Lumbar spine septic arthritis, discitis  Acute non-traumatic back pain  -Denies injury/trauma recently or in the past, had a fall 1 year ago and broke arm, but not back injury  -on admission WBC WNL, CRP 62.5  -Lumbar MRI shows edema around L3/L4 and L4/L5 facet joints with extension dorsally and appear consistent with septic arthritis at L3-L4 and L4-L5 with extension into the dorsal epidural space with early dorsal epidural phlegmon in those areas.    -Neurosurgery consulted, no drainable abscess, have signed off  -ID consulted  -Continue vancomycin and ceftriaxone  -IR consult for aspiration today 10/22, Culture pending, anaerobic culture transported incorrectly and may not be accurate  -Pain management, pain has been progressively better controlled, will titrate off of IV pain medications, oxycodone available  -PICC line placed 10/23    Insulin-dependent type 2 diabetes  -Hemoglobin A1c 10/21: 8.3%  -Is on 12 to 13 units of glargine at night  -Continue Lantus 6 units at bedtime  -Sliding scale insulin with meals and at bedtime  -Hypoglycemic protocol in place    Essential hypertension  -Does not appear to be on medications outpatient  -Continue amlodipine 5 mg  -Once VIKTOR resolves should discuss adding/changing to ACE or ARB with PCP    VIKTOR (resolved) on CKD stage 3b  -Baseline over the last year appears to fluctuate between 1.4 and 2.2.  -On admission 1.63 with increased to 1.9 10/22, now improving  -We will monitor and avoid nephrotoxic agents as possible    History of Recurrent UTI  -Recently diagnosed UTI, UA in the clinic this week showed pyuria and she was started on Keflex.  Unable to see urine culture.  Was  "started on keflex which has been held  -Has had 6 episodes of dysuria in the past year with 5 of those episodes positive for E. coli on urine culture  -CT urogram 10/23: No evidence of bladder fistulous communication  -Urology consulted, appreciate assistance        Diet: Moderate Consistent Carb (60 g CHO per Meal) Diet    DVT Prophylaxis: Pneumatic Compression Devices  Rg Catheter: Not present  Lines: PRESENT      PICC 10/23/23 Single Lumen Right Basilic antibiotics-Site Assessment: WDL      Cardiac Monitoring: None  Code Status: Full Code      Clinically Significant Risk Factors                  # Hypertension: Noted on problem list       # DMII: A1C = 8.3 % (Ref range: <5.7 %) within past 6 months   # Overweight: Estimated body mass index is 29.63 kg/m  as calculated from the following:    Height as of this encounter: 1.575 m (5' 2\").    Weight as of this encounter: 73.5 kg (162 lb)., PRESENT ON ADMISSION            Disposition Plan      Expected Discharge Date: 10/24/2023    Discharge Delays: IV Medication - consider oral or Home Infusion  Destination: home              Mary Meraz MD  Hospitalist Service  [facility]  Securely message with [employer] (more info)  Text page via [employer] Paging/Directory   ______________________________________________________________________    Interval History   Mrs. Bunn is doing fine this morning.  She was not complaining of pain at the time of the exam.  Her blood pressure remains elevated.  Was started on amlodipine.  Will likely need to start a second blood pressure medication for optimal control.  Starting to wean from IV antibiotics.  Has oxycodone and IV Dilaudid available.  Had PICC placed yesterday.  Discussed with social work infusion.  Will know antibiotic once culture complete.    Physical Exam   Vital Signs: Temp: 98.1  F (36.7  C) Temp src: Oral BP: (!) 169/82 Pulse: 79   Resp: 18 SpO2: 96 % O2 Device: None (Room air)    Weight: " 162 lbs 0 oz    General Appearance: Awake, sleepy, in no acute distress  Respiratory: CTAB, no wheeze  Cardiovascular: RRR, no murmur noted  GI: soft, nontender, non distended, normal bowel sounds  Skin: no jaundice, no rash    Medical Decision Making       45 MINUTES SPENT BY ME on the date of service doing chart review, history, exam, documentation & further activities per the note.      Data     I have personally reviewed the following data over the past 24 hrs:    4.7  \   11.3 (L)   / 198     137 102 14.7 /  169 (H)   3.9 25 1.50 (H) \       Imaging results reviewed over the past 24 hrs:   Recent Results (from the past 24 hour(s))   CT Cystogram wo & w Contrast    Narrative    EXAM: CT CYSTOGRAM WO and W CONTRAST  LOCATION: [facility]  DATE: 10/23/2023    INDICATION: Recurrent UTI, evaluate for fistula.  COMPARISON: None.  TECHNIQUE: CT pelvis without IV contrast using cystogram technique. Images without, and with filling of the bladder with 200 mL of dilute contrast. Post drain images were obtained. Multiplanar reformats were obtained. Dose reduction techniques were used.  CONTRAST: isovue 370 20ml    FINDINGS:   BLADDER: No extravasated urinary bladder contrast identified. No definite communication/fistula with adjacent bowel or the vagina. No communication/fistula is seen with the uterus.    ADDITIONAL FINDINGS: No pelvic masses or adenopathy. Nonspecific bowel gas pattern. Vascular calcifications common iliac arteries. Appendix is visualized and appears within normal limits. No inguinal masses or adenopathy.      Impression    IMPRESSION:  1.  No evidence for urinary bladder fistulous communication. Please see above report.

## 2023-10-24 NOTE — PROGRESS NOTES
Care Management Follow Up    Length of Stay (days): 3    Expected Discharge Date: 10/26/2023     Concerns to be Addressed:  Infection, IV antibiotics, pain-prn IV pain med       Patient plan of care discussed at interdisciplinary rounds: Yes    Anticipated Discharge Disposition:  TBD     Anticipated Discharge Services:  TBD    Anticipated Discharge DME:  TBD      Additional Information:  Patient presenting with with back pain which started about 2 days ago. Lumbar septic arthritis. History of Recurrent UTI . Urology, ID, Neurosurgery and Pain management consulted.     Therapy eval pending, needs orders when appropriate      Social History:  Pt lives in a home alone usually, but her grandson Zachariah is currently staying with pt. Patient is Ind at baseline with all ADLs, and IADLs. Pt is retired, but does work part time. Pt drives. [facility] No svcs prior. No mobility aides.       10/24/23:  Not medically ready for discharge. Referral was made to [facility]. Benefits verified. [facility].  Pt has met her $250 deductible.  She has 95/5 coverage until her out of pocket of $1700 is met (has met $679 so far).  Once met, she should have 100% coverage.     Final discharge plan pending.       Erica Awad RN

## 2023-10-24 NOTE — PLAN OF CARE
Problem: Adult Inpatient Plan of Care  Goal: Absence of Hospital-Acquired Illness or Injury  Intervention: Identify and Manage Fall Risk  Recent Flowsheet Documentation  Taken 10/24/2023 1659 by Meredith Zamora RN  Safety Promotion/Fall Prevention: activity supervised  Intervention: Prevent and Manage VTE (Venous Thromboembolism) Risk  Recent Flowsheet Documentation  Taken 10/24/2023 1659 by Meredith Zamora RN  VTE Prevention/Management: SCDs (sequential compression devices) off  Intervention: Prevent Infection  Recent Flowsheet Documentation  Taken 10/24/2023 1659 by Meredith Zamora RN  Infection Prevention:   hand hygiene promoted   rest/sleep promoted     Problem: Pain Acute  Goal: Optimal Pain Control and Function  Intervention: Prevent or Manage Pain  Recent Flowsheet Documentation  Taken 10/24/2023 1659 by Meredith Zamora RN  Sensory Stimulation Regulation: television on  Bowel Elimination Promotion: adequate fluid intake promoted  Medication Review/Management: medications reviewed   Goal Outcome Evaluation:  Pt is still having significant back pain but states she is trying to cut down on opiates.  She did get 2 doses of Morphine 2 mg IV as she declined Oxycodone stating she doesn't like how Oxy makes her feel.  She is up to the bathroom with SBA due to weakness in both legs.  Voiding very well.  Glucose have been 169 and 231 this shift.  She got Lantus at HS with sliding scale regular insulin.

## 2023-10-24 NOTE — PROVIDER NOTIFICATION
Texted Dr Meraz:     pt wants something other than oxycodone. Is there a morphine tablet? She likes morphine.

## 2023-10-24 NOTE — PROGRESS NOTES
"SPIRITUAL HEALTH SERVICES Progress Note  [facility], P4    Saw pt Ana Bunn per admission screening request.    Patient/Family Understanding of Illness and Goals of Care - Ana shared about her medical condition, that she has came in with back pain and infection. She states that she doesn't know the cause of the infection. She is hoping to be able to get well enough to discharge soon.     Distress and Loss - Ongoing health challenges, otherwise no other distress noted at this time.     Strengths, Coping, and Resources - Her daughter is source of support, she was speaking with her on the phone at time of visit.     Meaning, Beliefs, and Spirituality - Patient comes from [place] background and derives meaning, purpose, and comfort from chase. She shared aspects of her \"strong chase\" and that she prays often. She welcomed prayer of blessing from .     Plan of Care - A  will continue to visit as able or per request by patient/family/staff.      Hemanth Lopez MDiv, [facility]  /Manager Spiritual Health Services  [phone]    "

## 2023-10-24 NOTE — PROGRESS NOTES
Place of Service:  [facility]     Reason for follow up: Recurrent UTI in setting of acute onset lumbar osteomyelitis    SUBJECTIVE:  Events: no acute events overnight    Patient reports she is feeling a little better, more ambulatory today. Denies abdominal and left flank pain, notes some right sided back pain.     OBJECTIVE:  PHYSICAL EXAM:  Temp: 98.3  F (36.8  C) Temp src: Oral BP: (!) 152/80 Pulse: 88   Resp: 16 SpO2: 96 % O2 Device: None (Room air)    General: NAD, alert, cooperative  Head: normocephalic, without abnormality / atraumatic  Abdomen: soft, non- tender, non distended. no suprapubic fullness, no suprapubic tenderness. No bilateral CVA tenderness. Has right back pain close to spine, not at CVA.   Genitourinary: Deferred.   Skin: No rashes or lesions  Musculoskeletal: moves all four extremities equally.   Psychological: alert and oriented, answers questions appropriately.     LABS:  Creatinine   Date Value Ref Range Status   10/24/2023 1.50 (H) 0.51 - 0.95 mg/dL Final     WBC Count   Date Value Ref Range Status   10/24/2023 4.7 4.0 - 11.0 10e3/uL Final     Hemoglobin   Date Value Ref Range Status   10/24/2023 11.3 (L) 11.7 - 15.7 g/dL Final   ]  Platelet Count   Date Value Ref Range Status   10/24/2023 198 150 - 450 10e3/uL Final       UA:  UA RESULTS:  Recent Labs   Lab Test 08/06/23  1442   COLOR Light Yellow   APPEARANCE Clear   URINEGLC Negative   URINEBILI Negative   URINEKETONE Negative   SG 1.007   UBLD Negative   URINEPH 7.0   PROTEIN 200*   NITRITE Negative   LEUKEST 25 Ania/uL*   RBCU 0   WBCU 5       Cultures:  Urine 10/19/23 (HP): >100k E.coli (I-cipro)    Blood 10/21: NGTD    L4-5 facet joint cultures: NGTD.     Lab Results: personally reviewed.     IMAGING:   EXAM: CT CYSTOGRAM WO and W CONTRAST  LOCATION: [facility]  DATE: 10/23/2023     INDICATION: Recurrent UTI, evaluate for fistula.  COMPARISON: None.  TECHNIQUE: CT pelvis without IV contrast using  cystogram technique. Images without, and with filling of the bladder with 200 mL of dilute contrast. Post drain images were obtained. Multiplanar reformats were obtained. Dose reduction techniques were used.  CONTRAST: isovue 370 20ml     FINDINGS:   BLADDER: No extravasated urinary bladder contrast identified. No definite communication/fistula with adjacent bowel or the vagina. No communication/fistula is seen with the uterus.     ADDITIONAL FINDINGS: No pelvic masses or adenopathy. Nonspecific bowel gas pattern. Vascular calcifications common iliac arteries. Appendix is visualized and appears within normal limits. No inguinal masses or adenopathy.                                                                      IMPRESSION:  1.  No evidence for urinary bladder fistulous communication. Please see above report.      ASSESSMENT/PLAN:  Ana Bunn is being seen by [facility] for Recurrent UTI in setting of acute onset lumbar osteomyelitis    - 62 yr old female with 4 symptomatic E.coli UTIs beginning 4/2023 and the last on 10/19/23 ([facility]). Now with acute onset lumbar osteomyelitis, s/p disc aspiration 10/22, cultures pending.   - No evidence of fistula (by CT cystogram 10/23) or stones/hydro (CT lumbar spine 10/21).   - Creatinine 1.50 (baseline 1.44-1.95).   - PVRs 59 and 117 ml, unlikely to be contributing significantly to recurrent infections.   - Blood cultures: NGTD. Remains afebrile. WBC WNL. ID following, covering with IV ceftriaxone and IV Vanco. Recommend treating urine with 10-14 day antibiotic course, then consider low dose suppressive antibiotic x 3 months; Appreciate ID recommendations/management.   - Email sent to [facility] to arrange for follow up in 2-3 weeks. [facility] contact info added to discharge orders.   -[facility] will sign off. Please re-consult with any new or worsening urologic concerns.      Osman Nuñez, APRN, CNP  [facility]   [phone]

## 2023-10-24 NOTE — PLAN OF CARE
"  Problem: Pain Acute  Goal: Optimal Pain Control and Function  Outcome: Progressing  Intervention: Develop Pain Management Plan  Recent Flowsheet Documentation  Taken 10/24/2023 0235 by Katiuska Rivera RN  Pain Management Interventions:   medication (see MAR)   pillow support provided  Intervention: Prevent or Manage Pain  Recent Flowsheet Documentation  Taken 10/24/2023 0235 by Katiuska Rivera RN  Medication Review/Management: medications reviewed     Problem: Mobility Impairment  Goal: Optimal Mobility  Outcome: Not Progressing   Goal Outcome Evaluation:    Pain present with movement/ambulation; ok at rest. Pain located to right lower back radiating down right leg. Describes pain as \"burning\". Pain managed with IV Morphine. RLE supported with pillows. PVR 59 ml and 117 ml. Bedrest with bathroom privileges.     "

## 2023-10-24 NOTE — PLAN OF CARE
Problem: Adult Inpatient Plan of Care  Goal: Absence of Hospital-Acquired Illness or Injury  Intervention: Identify and Manage Fall Risk  Recent Flowsheet Documentation  Taken 10/23/2023 1636 by Meredith Zamora RN  Safety Promotion/Fall Prevention: activity supervised  Intervention: Prevent Infection  Recent Flowsheet Documentation  Taken 10/23/2023 1636 by Meredith Zamora RN  Infection Prevention:   hand hygiene promoted   rest/sleep promoted     Problem: Pain Acute  Goal: Optimal Pain Control and Function  Intervention: Prevent or Manage Pain  Recent Flowsheet Documentation  Taken 10/23/2023 1636 by Meredith Zamora RN  Medication Review/Management: medications reviewed   Goal Outcome Evaluation:  Pt has had ongoing pain with movement that is relieved with IV Morphine.  She had a CT Cystogram tonight.  She had a garcia placed for procedure with 1200 ml output.  She insisted on having it removed due to discomfort.

## 2023-10-25 LAB
ANION GAP SERPL CALCULATED.3IONS-SCNC: 11 MMOL/L (ref 7–15)
BUN SERPL-MCNC: 16.9 MG/DL (ref 8–23)
CALCIUM SERPL-MCNC: 9.2 MG/DL (ref 8.8–10.2)
CHLORIDE SERPL-SCNC: 101 MMOL/L (ref 98–107)
CREAT SERPL-MCNC: 1.59 MG/DL (ref 0.51–0.95)
DEPRECATED HCO3 PLAS-SCNC: 25 MMOL/L (ref 22–29)
EGFRCR SERPLBLD CKD-EPI 2021: 36 ML/MIN/1.73M2
ERYTHROCYTE [DISTWIDTH] IN BLOOD BY AUTOMATED COUNT: 12.4 % (ref 10–15)
GLUCOSE BLDC GLUCOMTR-MCNC: 122 MG/DL (ref 70–99)
GLUCOSE BLDC GLUCOMTR-MCNC: 154 MG/DL (ref 70–99)
GLUCOSE BLDC GLUCOMTR-MCNC: 203 MG/DL (ref 70–99)
GLUCOSE BLDC GLUCOMTR-MCNC: 256 MG/DL (ref 70–99)
GLUCOSE BLDC GLUCOMTR-MCNC: 264 MG/DL (ref 70–99)
GLUCOSE SERPL-MCNC: 111 MG/DL (ref 70–99)
HCT VFR BLD AUTO: 33.5 % (ref 35–47)
HGB BLD-MCNC: 11.4 G/DL (ref 11.7–15.7)
MCH RBC QN AUTO: 31.4 PG (ref 26.5–33)
MCHC RBC AUTO-ENTMCNC: 34 G/DL (ref 31.5–36.5)
MCV RBC AUTO: 92 FL (ref 78–100)
PLATELET # BLD AUTO: 212 10E3/UL (ref 150–450)
POTASSIUM SERPL-SCNC: 3.8 MMOL/L (ref 3.4–5.3)
RBC # BLD AUTO: 3.63 10E6/UL (ref 3.8–5.2)
SODIUM SERPL-SCNC: 137 MMOL/L (ref 135–145)
VANCOMYCIN SERPL-MCNC: 13.5 UG/ML
WBC # BLD AUTO: 4.9 10E3/UL (ref 4–11)

## 2023-10-25 PROCEDURE — 99407 BEHAV CHNG SMOKING > 10 MIN: CPT

## 2023-10-25 PROCEDURE — 250N000013 HC RX MED GY IP 250 OP 250 PS 637: Performed by: INTERNAL MEDICINE

## 2023-10-25 PROCEDURE — 999N000033 HC STATISTIC CHRONIC PULMONARY DISEASE SPECIALIST

## 2023-10-25 PROCEDURE — 85027 COMPLETE CBC AUTOMATED: CPT | Performed by: INTERNAL MEDICINE

## 2023-10-25 PROCEDURE — 250N000011 HC RX IP 250 OP 636: Mod: JZ | Performed by: INTERNAL MEDICINE

## 2023-10-25 PROCEDURE — 120N000001 HC R&B MED SURG/OB

## 2023-10-25 PROCEDURE — 80048 BASIC METABOLIC PNL TOTAL CA: CPT | Performed by: INTERNAL MEDICINE

## 2023-10-25 PROCEDURE — 80202 ASSAY OF VANCOMYCIN: CPT | Performed by: INTERNAL MEDICINE

## 2023-10-25 PROCEDURE — 250N000011 HC RX IP 250 OP 636: Performed by: INTERNAL MEDICINE

## 2023-10-25 PROCEDURE — 99232 SBSQ HOSP IP/OBS MODERATE 35: CPT | Performed by: INTERNAL MEDICINE

## 2023-10-25 PROCEDURE — 250N000013 HC RX MED GY IP 250 OP 250 PS 637: Performed by: EMERGENCY MEDICINE

## 2023-10-25 PROCEDURE — 999N000032 HC STATISTIC CHRONIC DISEASE SPECIALIST RT CONSULT

## 2023-10-25 RX ADMIN — Medication 25 MCG: at 09:43

## 2023-10-25 RX ADMIN — ACETAMINOPHEN 975 MG: 325 TABLET ORAL at 05:50

## 2023-10-25 RX ADMIN — CEFTRIAXONE SODIUM 2 G: 2 INJECTION, POWDER, FOR SOLUTION INTRAMUSCULAR; INTRAVENOUS at 09:43

## 2023-10-25 RX ADMIN — ACETAMINOPHEN 975 MG: 325 TABLET ORAL at 12:21

## 2023-10-25 RX ADMIN — THERA TABS 1 TABLET: TAB at 09:43

## 2023-10-25 RX ADMIN — AMLODIPINE BESYLATE 5 MG: 5 TABLET ORAL at 09:43

## 2023-10-25 RX ADMIN — ACETAMINOPHEN 975 MG: 325 TABLET ORAL at 20:00

## 2023-10-25 RX ADMIN — VANCOMYCIN HYDROCHLORIDE 1000 MG: 1 INJECTION, SOLUTION INTRAVENOUS at 10:57

## 2023-10-25 RX ADMIN — MORPHINE SULFATE 2 MG: 2 INJECTION, SOLUTION INTRAMUSCULAR; INTRAVENOUS at 00:52

## 2023-10-25 RX ADMIN — THIAMINE HCL TAB 100 MG 100 MG: 100 TAB at 09:43

## 2023-10-25 ASSESSMENT — ACTIVITIES OF DAILY LIVING (ADL)
ADLS_ACUITY_SCORE: 21
ADLS_ACUITY_SCORE: 21
ADLS_ACUITY_SCORE: 22
ADLS_ACUITY_SCORE: 21
ADLS_ACUITY_SCORE: 21
ADLS_ACUITY_SCORE: 22
ADLS_ACUITY_SCORE: 22
ADLS_ACUITY_SCORE: 21
ADLS_ACUITY_SCORE: 22
ADLS_ACUITY_SCORE: 35
ADLS_ACUITY_SCORE: 21
ADLS_ACUITY_SCORE: 22

## 2023-10-25 NOTE — PLAN OF CARE
"  Problem: Adult Inpatient Plan of Care  Goal: Plan of Care Review  Description: The Plan of Care Review/Shift note should be completed every shift.  The Outcome Evaluation is a brief statement about your assessment that the patient is improving, declining, or no change.  This information will be displayed automatically on your shift  note.  Outcome: Progressing  Goal: Patient-Specific Goal (Individualized)  Description: You can add care plan individualizations to a care plan. Examples of Individualization might be:  \"Parent requests to be called daily at 9am for status\", \"I have a hard time hearing out of my right ear\", or \"Do not touch me to wake me up as it startles  me\".  Outcome: Progressing  Goal: Absence of Hospital-Acquired Illness or Injury  Outcome: Progressing  Intervention: Identify and Manage Fall Risk  Recent Flowsheet Documentation  Taken 10/25/2023 0052 by Umu Reynaga RN  Safety Promotion/Fall Prevention: activity supervised  Intervention: Prevent Skin Injury  Recent Flowsheet Documentation  Taken 10/25/2023 0052 by Umu Reynaga RN  Body Position: position changed independently  Intervention: Prevent and Manage VTE (Venous Thromboembolism) Risk  Recent Flowsheet Documentation  Taken 10/25/2023 0052 by Umu Reynaga RN  VTE Prevention/Management: SCDs (sequential compression devices) off  Intervention: Prevent Infection  Recent Flowsheet Documentation  Taken 10/25/2023 0052 by Umu Reynaga RN  Infection Prevention:   hand hygiene promoted   rest/sleep promoted  Goal: Optimal Comfort and Wellbeing  Outcome: Progressing  Intervention: Monitor Pain and Promote Comfort  Recent Flowsheet Documentation  Taken 10/25/2023 0115 by Umu Reynaga RN  Pain Management Interventions: emotional support  Taken 10/25/2023 0052 by Umu Reynaga RN  Pain Management Interventions:   medication (see MAR)   emotional support   heat applied  Goal: Readiness for Transition of Care  Outcome: " Progressing  Intervention: Mutually Develop Transition Plan  Recent Flowsheet Documentation  Taken 10/25/2023 0052 by Umu Reynaga, RN  Equipment Currently Used at Home: none   Goal Outcome Evaluation:       Pt a/o with continued pain in low back down right leg. Pt taking iv morphine and po tylenol for pain (pt states she does not tolerate the oxycodone). Slept well between cares. Will monitor.

## 2023-10-25 NOTE — PHARMACY-VANCOMYCIN DOSING SERVICE
Pharmacy Vancomycin Note  Date of Service 2023  Patient's  1960   62 year old, female    Indication: Abscess  Day of Therapy: 4  Current vancomycin regimen:  1000 mg IV q24h  Current vancomycin monitoring method: AUC  Current vancomycin therapeutic monitoring goal: 400-600 mg*h/L    [employer] Prediction of Current Vancomycin Regimen  Loading dose: N/A  Regimen: 1000 mg IV every 24 hours.  Start time: 11:32 on 10/25/2023  Exposure target: AUC24 (range)400-600 mg/L.hr   AUC24,ss: 494 mg/L.hr  Probability of AUC24 > 400: 86 %  Ctrough,ss: 15.6 mg/L  Probability of Ctrough,ss > 20: 18 %  Probability of nephrotoxicity (Lodise CORWIN ): 11 %    Current estimated CrCl = Estimated Creatinine Clearance: 34.5 mL/min (A) (based on SCr of 1.59 mg/dL (H)).    Creatinine for last 3 days  10/23/2023:  9:49 AM Creatinine 1.56 mg/dL  10/24/2023:  7:01 AM Creatinine 1.50 mg/dL  10/25/2023:  5:46 AM Creatinine 1.59 mg/dL    Recent Vancomycin Levels (past 3 days)  10/25/2023:  5:46 AM Vancomycin 13.5 ug/mL    Vancomycin IV Administrations (past 72 hours)                     vancomycin (VANCOCIN) 1,000 mg in 200 mL dextrose intermittent infusion (mg) 1,000 mg New Bag 10/24/23 1132     1,000 mg New Bag 10/23/23 1226     1,000 mg New Bag 10/22/23 1331                    Nephrotoxins and other renal medications (From now, onward)      Start     Dose/Rate Route Frequency Ordered Stop    10/22/23 1300  vancomycin (VANCOCIN) 1,000 mg in 200 mL dextrose intermittent infusion         1,000 mg  200 mL/hr over 1 Hours Intravenous EVERY 24 HOURS 10/22/23 1208                 Contrast Orders - past 72 hours (72h ago, onward)      Start     Dose/Rate Route Frequency Stop    10/23/23 2130  iopamidol (ISOVUE-370) solution 20 mL         20 mL Urethral ONCE 10/23/23 2120            Interpretation of levels and current regimen:  Vancomycin level is reflective of -600    Has serum creatinine changed greater than 50% in last 72  hours: No    Urine output:  unable to determine    Renal Function: Stable    Plan:  Continue Current Dose  Vancomycin monitoring method: AUC  Vancomycin therapeutic monitoring goal: 400-600 mg*h/L  Pharmacy will check vancomycin levels as appropriate in 3-5 Days.  Serum creatinine levels will be ordered daily for the first week of therapy and at least twice weekly for subsequent weeks.    SYDNIE WHELAN RPH

## 2023-10-25 NOTE — PROGRESS NOTES
[facility]    Medicine Progress Note - Hospitalist Service    Date of Admission:  10/21/2023    Assessment & Plan   Ana Bunn is a 62 year old female with PMHx significant for HTN, CKD3, DM2, GERD, mild TBI, and anemia who presented with 2 days of acute back pain.     Lumbar spine septic arthritis, discitis  Acute non-traumatic back pain  -Denies injury/trauma recently or in the past, had a fall 1 year ago and broke arm, but not back injury  -on admission WBC WNL, CRP 62.5  -Lumbar MRI shows edema around L3/L4 and L4/L5 facet joints with extension dorsally and appear consistent with septic arthritis at L3-L4 and L4-L5 with extension into the dorsal epidural space with early dorsal epidural phlegmon in those areas.    -Neurosurgery consulted, no drainable abscess, have signed off  -ID consulted, appreciate recs  -Continue vancomycin and ceftriaxone  -IR consult for aspiration today 10/22, Culture pending, anaerobic culture transported incorrectly and may not be accurate  -Pain management, continue to titrate off IV   -PICC line placed 10/23    Insulin-dependent type 2 diabetes  -Hemoglobin A1c 10/21: 8.3%  -Is on 12 to 13 units of glargine at night  -now that she is eating better will go back to home dosing lantus 12 units  -Sliding scale insulin with meals and at bedtime  -Hypoglycemic protocol in place    Essential hypertension  -Does not appear to be on medications outpatient  -Continue amlodipine 5 mg  -Once VIKTOR resolves should discuss adding/changing to ACE or ARB with PCP    VIKTOR (resolved) on CKD stage 3b  -Baseline over the last year appears to fluctuate between 1.4 and 2.2.  -On admission 1.63 with increased to 1.9 10/22, now improving  -We will monitor and avoid nephrotoxic agents as possible    History of Recurrent UTI  -Recently diagnosed UTI, UA in the clinic this week showed pyuria and she was started on Keflex.  Unable to see urine culture.  Was started on keflex which has  "been held  -Has had 6 episodes of dysuria in the past year with 5 of those episodes positive for E. coli on urine culture  -CT urogram 10/23: No evidence of bladder fistulous communication  -Urology consulted, appreciate assistance    Tobacco abuse  -Counseled on smoking cessation by respiratory therapy        Diet: Moderate Consistent Carb (60 g CHO per Meal) Diet    DVT Prophylaxis: Pneumatic Compression Devices  Rg Catheter: Not present  Lines: PRESENT      PICC 10/23/23 Single Lumen Right Basilic antibiotics-Site Assessment: WDL      Cardiac Monitoring: None  Code Status: Full Code      Clinically Significant Risk Factors                  # Hypertension: Noted on problem list       # DMII: A1C = 8.3 % (Ref range: <5.7 %) within past 6 months   # Overweight: Estimated body mass index is 29.63 kg/m  as calculated from the following:    Height as of this encounter: 1.575 m (5' 2\").    Weight as of this encounter: 73.5 kg (162 lb).             Disposition Plan      Expected Discharge Date: 10/27/2023    Discharge Delays: IV Medication - consider oral or Home Infusion  PT New Consult needed  OT New Consult needed  Specialist Consult (enter specialist & decision needed in comments)  Lab Result Pending (enter specific test & time in comments)  Destination: home              Mary Meraz MD  Hospitalist Service  [facility]  Securely message with [employer] (more info)  Text page via [employer] Paging/Directory   ______________________________________________________________________    Interval History   Mrs. Bunn is having significantly less pain today.  She is able to ambulate with significantly less discomfort than prior.  Social work working on getting infusion set up but seems to be expensive.  Has PICC line already.  Continue on same antibiotics and pending cultures.     Physical Exam   Vital Signs: Temp: 98  F (36.7  C) Temp src: Oral BP: 137/82 Pulse: 89   Resp: 17 SpO2: 97 % O2 " Device: None (Room air)    Weight: 162 lbs 0 oz    General Appearance: Awake, alert, in no acute distress  Respiratory: CTAB, no wheeze  Cardiovascular: RRR, no murmur noted  GI: soft, nontender, non distended, normal bowel sounds  Skin: no jaundice, no rash      Medical Decision Making       40 MINUTES SPENT BY ME on the date of service doing chart review, history, exam, documentation & further activities per the note.      Data     I have personally reviewed the following data over the past 24 hrs:    4.9  \   11.4 (L)   / 212     137 101 16.9 /  264 (H)   3.8 25 1.59 (H) \       Imaging results reviewed over the past 24 hrs:   No results found for this or any previous visit (from the past 24 hour(s)).

## 2023-10-25 NOTE — PROGRESS NOTES
"[facility] Progress Note    SUBJECTIVE:  able to get to bathroom but bad back pain.      REVIEW OF SYSTEMS:  Negative unless as listed above.  Social history, Family history, Medications: reviewed.    OBJECTIVE:  BP (!) 154/86 (BP Location: Left arm, Patient Position: Sitting)   Pulse 105   Temp 98.3  F (36.8  C) (Oral)   Resp 18   Ht 1.575 m (5' 2\")   Wt 73.5 kg (162 lb)   SpO2 97%   BMI 29.63 kg/m                PHYSICAL EXAM:  Alert, awake  Vitals tabulated above, reviewed  Neck supple without lymphadenopathy  Sclera normal color, not injected  CARDIOVASCULAR regular rate and rhythm, no murmur  Lungs CLEAR TO AUSCULTATION   Abdomen soft, NT/ND, absent HEPATOSPLENOMEGALY  Skin normal  Joints normal  Neurologic exam axial pain, as would expect  Antibiotics:    Pertinent labs:  Lab Results   Component Value Date    WBC 5.6 10/22/2023     Lab Results   Component Value Date    RBC 3.61 10/22/2023     Lab Results   Component Value Date    HGB 11.4 10/22/2023     Lab Results   Component Value Date    HCT 34.7 10/22/2023     No components found for: \"MCT\"  Lab Results   Component Value Date    MCV 96 10/22/2023     Lab Results   Component Value Date    MCH 31.6 10/22/2023     Lab Results   Component Value Date    MCHC 32.9 10/22/2023     Lab Results   Component Value Date    RDW 12.6 10/22/2023     Lab Results   Component Value Date     10/22/2023       Last Comprehensive Metabolic Panel:  Sodium   Date Value Ref Range Status   10/25/2023 137 135 - 145 mmol/L Final     Comment:     Reference intervals for this test were updated on 09/26/2023 to more accurately reflect our healthy population. There may be differences in the flagging of prior results with similar values performed with this method. Interpretation of those prior results can be made in the context of the updated reference intervals.      Potassium   Date Value Ref Range Status   10/25/2023 3.8 3.4 - 5.3 mmol/L Final   04/19/2022 " "4.0 3.5 - 5.0 mmol/L Final     Chloride   Date Value Ref Range Status   10/25/2023 101 98 - 107 mmol/L Final   04/19/2022 99 98 - 107 mmol/L Final     Carbon Dioxide (CO2)   Date Value Ref Range Status   10/25/2023 25 22 - 29 mmol/L Final   04/19/2022 22 22 - 31 mmol/L Final     Anion Gap   Date Value Ref Range Status   10/25/2023 11 7 - 15 mmol/L Final   04/19/2022 11 5 - 18 mmol/L Final     Glucose   Date Value Ref Range Status   04/19/2022 145 (H) 70 - 125 mg/dL Final     GLUCOSE BY METER POCT   Date Value Ref Range Status   10/25/2023 256 (H) 70 - 99 mg/dL Final     Urea Nitrogen   Date Value Ref Range Status   10/25/2023 16.9 8.0 - 23.0 mg/dL Final   04/19/2022 17 8 - 22 mg/dL Final     Creatinine   Date Value Ref Range Status   10/25/2023 1.59 (H) 0.51 - 0.95 mg/dL Final     GFR Estimate   Date Value Ref Range Status   10/25/2023 36 (L) >60 mL/min/1.73m2 Final     Calcium   Date Value Ref Range Status   10/25/2023 9.2 8.8 - 10.2 mg/dL Final       Liver Function Studies -   Recent Labs   Lab Test 02/07/22  0830   PROTTOTAL 6.6   ALBUMIN 3.5   BILITOTAL 0.2   ALKPHOS 61   AST 20   ALT 20       No results found for: \"SED\"    CRP   Date Value Ref Range Status   02/07/2022 0.5 0.0-<0.8 mg/dL Final               MICROBIOLOGY DATA:    Bx micro pending    IMAGING/RADIOLOGY:    FINDINGS:   BLADDER: No extravasated urinary bladder contrast identified. No definite communication/fistula with adjacent bowel or the vagina. No communication/fistula is seen with the uterus.     ADDITIONAL FINDINGS: No pelvic masses or adenopathy. Nonspecific bowel gas pattern. Vascular calcifications common iliac arteries. Appendix is visualized and appears within normal limits. No inguinal masses or adenopathy.                                                                      IMPRESSION:  1.  No evidence for urinary bladder fistulous communication. Please see above report.      ASSESSMENT:  Discitis/facet arthritis  No clear urologic " explanation by CT cysto  Recurrent UTIs since 2020    RECOMMENDATION:  Follow results  PICC in  Vanco and ctx   Urologic eval  Maybe we should MRI this lady again tomorrow?      ART Luther MD  Office [phone] option 2 to desk staff

## 2023-10-25 NOTE — PLAN OF CARE
Problem: Adult Inpatient Plan of Care  Goal: Absence of Hospital-Acquired Illness or Injury  Intervention: Identify and Manage Fall Risk  Recent Flowsheet Documentation  Taken 10/25/2023 0940 by Asha Maddox RN  Safety Promotion/Fall Prevention: activity supervised     Problem: Adult Inpatient Plan of Care  Goal: Absence of Hospital-Acquired Illness or Injury  Intervention: Prevent Skin Injury  Recent Flowsheet Documentation  Taken 10/25/2023 0940 by Asha Maddox RN  Body Position: position changed independently     Problem: Adult Inpatient Plan of Care  Goal: Absence of Hospital-Acquired Illness or Injury  Intervention: Prevent Infection  Recent Flowsheet Documentation  Taken 10/25/2023 0940 by Asha Maddox RN  Infection Prevention: hand hygiene promoted     Problem: Pain Acute  Goal: Optimal Pain Control and Function  Intervention: Prevent or Manage Pain  Recent Flowsheet Documentation  Taken 10/25/2023 0940 by Asha Maddox RN  Sensory Stimulation Regulation: care clustered  Bowel Elimination Promotion:   adequate fluid intake promoted   ambulation promoted  Medication Review/Management: medications reviewed     Problem: Mobility Impairment  Goal: Optimal Mobility  Intervention: Optimize Mobility  Recent Flowsheet Documentation  Taken 10/25/2023 0940 by Asha Maddox RN  Activity Management: activity adjusted per tolerance   Goal Outcome Evaluation:       Alert and oriented. Pain mostly with ambulation. Only had prn tylenol for pain this shift. Up to the bathroom with 1 person SBA.

## 2023-10-25 NOTE — PROGRESS NOTES
Care Management Follow Up    Length of Stay (days): 4    Expected Discharge Date: 10/27/2023     Concerns to be Addressed:  Infection, IV antibiotics, pain-prn IV pain med, therapy recs       Patient plan of care discussed at interdisciplinary rounds: Yes     Anticipated Discharge Disposition:  TBD     Anticipated Discharge Services:  TBD     Anticipated Discharge DME:  TBD        Additional Information:  Patient presenting with with back pain which started about 2 days ago. Lumbar septic arthritis. History of Recurrent UTI . Urology, ID, Neurosurgery and Pain management consulted.      Therapy eval pending, needs orders when appropriate        Social History:  Pt lives in a home alone usually, but her grandson Zachariah is currently staying with pt. Patient is Ind at baseline with all ADLs, and IADLs. Pt is retired, but does work part time. Pt drives. [facility] No svcs prior. No mobility aides.         10/24/23:  Not medically ready for discharge. Referral was made to [facility]. Benefits verified. Pt [facility].  Pt has met her $250 deductible.  She has 95/5 coverage until her out of pocket of $1700 is met (has met $679 so far).  Once met, she should have 100% coverage.      Final discharge plan pending.       Erica Awad RN

## 2023-10-25 NOTE — CONSULTS
Tobacco Treatment Consult  10/25/2023, 2:40 PM    Patient Admitted for: Septic arthritis of lumbar spine (H24) [M46.56] on 10/21/2023    History of Tobacco Use:   Tobacco Product(s):cigarette  Amount used: 1/2 pack per day  Number of quit attempts in past: 1  Longest period of without use: 10 years  Pharmacotherapy tried in the past:  none  Fagerstrom Score: 4 Level of dependence 4-7 moderate  Medical co-morbidities impacting tobacco use:  none identified    Assessment:   Importance of quitting to patient: 8  Current confidence in ability to quit: 8  Readiness to quit/planning to quit: contemplative  Reasons for wanting to quit: health, smell  Emotional Triggers:anger and nervous or stressed  Physical and behavioral triggers: drinking alcohol, celebrating, and go to when she wants to relax  Nicotine withdrawal symptoms experiencing as an inpatient: nervousness and difficulty concentrating  Current major life stressors: experiencing a major health problem and recent loss of a loved one  Barriers to quitting:  lacks consistent motivation to quit      Education done during visit:  -Discussed triggers and response to them  -Discussed rewards and benefits of quitting tobacco use  -Educated on physical benefits to health of tobacco cessation  -Identified health alternatives  -Education on use of pharmacotherapy products and discussed options to determine what may work best for patient.  -Discussed barriers to quitting and how patient feels they may overcome them  -Discussed relapse prevention strategies once back in an environment where smoking is allowed.  -Educated on benefits of having a support system and remembering their reasons for quitting  -Identified strategies to increase motivation to quit  -Initiated self-identifying as a nonsmoker   -Issued tobacco cessation materials and resource information.      Recommendations:  -In the hospital recommend the patient have the following pharmacotherapy: Ana richards  interested in trying a lozenge - recommend Lozenge 2 mg every 2-3 hours as needed  -Upon discharge recommend the patient have the following pharmacotherapy: Patch 21 mg and Lozenge 2 mg every 2-3 hours   -Taper NRT recommended after 4 weeks of successful cessation.  Patch down to 14 mg for 4 weeks then 7 mg for 4 weeks. -Continued cessation therapy by this service via phone  -Continued encouragement from health care providers to quit and stay tobacco free.    Ana has our service's contact information should she have questions or want support post-discharge. Will continue to be available to patient throughout hospital stay.    Total 40 minutes spent in smoking cessation, and 35 minutes spent in chart review,care coordination, and documentation.    Gilbert James RT, Chronic Pulmonary Disease Specialist, Certified Tobacco Treatment Specialist  Phone [phone]

## 2023-10-26 ENCOUNTER — APPOINTMENT (OUTPATIENT)
Dept: PHYSICAL THERAPY | Facility: HOSPITAL | Age: 63
DRG: 552 | End: 2023-10-26
Attending: INTERNAL MEDICINE
Payer: COMMERCIAL

## 2023-10-26 LAB
ANION GAP SERPL CALCULATED.3IONS-SCNC: 11 MMOL/L (ref 7–15)
BACTERIA BLD CULT: NO GROWTH
BACTERIA BLD CULT: NO GROWTH
BUN SERPL-MCNC: 15.5 MG/DL (ref 8–23)
CALCIUM SERPL-MCNC: 9.1 MG/DL (ref 8.8–10.2)
CHLORIDE SERPL-SCNC: 105 MMOL/L (ref 98–107)
CREAT SERPL-MCNC: 1.56 MG/DL (ref 0.51–0.95)
DEPRECATED HCO3 PLAS-SCNC: 23 MMOL/L (ref 22–29)
EGFRCR SERPLBLD CKD-EPI 2021: 37 ML/MIN/1.73M2
GLUCOSE BLDC GLUCOMTR-MCNC: 138 MG/DL (ref 70–99)
GLUCOSE BLDC GLUCOMTR-MCNC: 209 MG/DL (ref 70–99)
GLUCOSE BLDC GLUCOMTR-MCNC: 230 MG/DL (ref 70–99)
GLUCOSE BLDC GLUCOMTR-MCNC: 231 MG/DL (ref 70–99)
GLUCOSE SERPL-MCNC: 118 MG/DL (ref 70–99)
POTASSIUM SERPL-SCNC: 3.8 MMOL/L (ref 3.4–5.3)
SODIUM SERPL-SCNC: 139 MMOL/L (ref 135–145)

## 2023-10-26 PROCEDURE — 120N000001 HC R&B MED SURG/OB

## 2023-10-26 PROCEDURE — 80048 BASIC METABOLIC PNL TOTAL CA: CPT | Performed by: INTERNAL MEDICINE

## 2023-10-26 PROCEDURE — 250N000013 HC RX MED GY IP 250 OP 250 PS 637: Performed by: EMERGENCY MEDICINE

## 2023-10-26 PROCEDURE — 99232 SBSQ HOSP IP/OBS MODERATE 35: CPT | Performed by: INTERNAL MEDICINE

## 2023-10-26 PROCEDURE — 250N000013 HC RX MED GY IP 250 OP 250 PS 637: Performed by: INTERNAL MEDICINE

## 2023-10-26 PROCEDURE — 97161 PT EVAL LOW COMPLEX 20 MIN: CPT | Mod: GP

## 2023-10-26 PROCEDURE — 250N000011 HC RX IP 250 OP 636: Mod: JZ | Performed by: INTERNAL MEDICINE

## 2023-10-26 PROCEDURE — 97116 GAIT TRAINING THERAPY: CPT | Mod: GP

## 2023-10-26 RX ORDER — TRAMADOL HYDROCHLORIDE 50 MG/1
50 TABLET ORAL EVERY 6 HOURS PRN
Status: DISCONTINUED | OUTPATIENT
Start: 2023-10-26 | End: 2023-10-27 | Stop reason: HOSPADM

## 2023-10-26 RX ORDER — LISINOPRIL 5 MG/1
5 TABLET ORAL DAILY
Status: DISCONTINUED | OUTPATIENT
Start: 2023-10-26 | End: 2023-10-27 | Stop reason: HOSPADM

## 2023-10-26 RX ORDER — POLYETHYLENE GLYCOL 3350 17 G
2 POWDER IN PACKET (EA) ORAL
Status: DISCONTINUED | OUTPATIENT
Start: 2023-10-26 | End: 2023-10-26

## 2023-10-26 RX ADMIN — LISINOPRIL 5 MG: 5 TABLET ORAL at 20:06

## 2023-10-26 RX ADMIN — MORPHINE SULFATE 2 MG: 2 INJECTION, SOLUTION INTRAMUSCULAR; INTRAVENOUS at 06:06

## 2023-10-26 RX ADMIN — Medication 25 MCG: at 09:11

## 2023-10-26 RX ADMIN — MORPHINE SULFATE 2 MG: 2 INJECTION, SOLUTION INTRAMUSCULAR; INTRAVENOUS at 09:11

## 2023-10-26 RX ADMIN — TRAMADOL HYDROCHLORIDE 50 MG: 50 TABLET, COATED ORAL at 21:47

## 2023-10-26 RX ADMIN — THIAMINE HCL TAB 100 MG 100 MG: 100 TAB at 09:11

## 2023-10-26 RX ADMIN — CEFTRIAXONE SODIUM 2 G: 2 INJECTION, POWDER, FOR SOLUTION INTRAMUSCULAR; INTRAVENOUS at 09:11

## 2023-10-26 RX ADMIN — AMLODIPINE BESYLATE 5 MG: 5 TABLET ORAL at 09:11

## 2023-10-26 RX ADMIN — MORPHINE SULFATE 2 MG: 2 INJECTION, SOLUTION INTRAMUSCULAR; INTRAVENOUS at 00:31

## 2023-10-26 RX ADMIN — THERA TABS 1 TABLET: TAB at 09:11

## 2023-10-26 RX ADMIN — TRAMADOL HYDROCHLORIDE 50 MG: 50 TABLET, COATED ORAL at 15:57

## 2023-10-26 RX ADMIN — VANCOMYCIN HYDROCHLORIDE 1000 MG: 1 INJECTION, SOLUTION INTRAVENOUS at 10:52

## 2023-10-26 ASSESSMENT — ACTIVITIES OF DAILY LIVING (ADL)
ADLS_ACUITY_SCORE: 21

## 2023-10-26 NOTE — PROGRESS NOTES
SPIRITUAL HEALTH SERVICES Note     Saw pt Ana Bunn and administered Religion sacrament of anointing for the healing of the sick.    Fr. Fred Watson

## 2023-10-26 NOTE — PROGRESS NOTES
"   10/26/23 6490   Appointment Info   Signing Clinician's Name / Credentials (PT) Jennie Chang, PT, DPT   Living Environment   People in Home alone  (grandson lives there but will be working during the day - family will be around intermittently to help)   Current Living Arrangements house   Home Accessibility stairs to enter home   Number of Stairs, Main Entrance 10   Stair Railings, Main Entrance railing on left side (ascending)   Self-Care   Current Activity Tolerance moderate   Equipment Currently Used at Home none  (has access to FWW)   Fall history within last six months no   General Information   Onset of Illness/Injury or Date of Surgery 10/21/23   Referring Physician Mary Meraz MD   Patient/Family Therapy Goals Statement (PT) Return to home   Pertinent History of Current Problem (include personal factors and/or comorbidities that impact the POC) Per Chart Review - \"62 year old female with back pain which started about 2 days ago.  She was seen in the clinic and diagnosed with a UTI but her pain worsened.  It is persistent but much worse when she is standing and moving.  No fevers or chills, no abdominal pain, no chest pain or shortness of breath.  No recent or past history of back surgery or previous back problems.\"   Existing Precautions/Restrictions no known precautions/restrictions   Weight-Bearing Status - LLE full weight-bearing   Weight-Bearing Status - RLE full weight-bearing   Range of Motion (ROM)   Range of Motion ROM deficits secondary to pain   Strength (Manual Muscle Testing)   Strength (Manual Muscle Testing) Deficits observed during functional mobility   Bed Mobility   Bed Mobility supine-sit   Supine-Sit [place] (Bed Mobility) contact guard;supervision;verbal cues   Transfers   Transfers sit-stand transfer   Maintains Weight-bearing Status (Transfers) able to maintain   Sit-Stand Transfer   Sit-Stand [place] (Transfers) supervision;verbal cues;contact guard   Assistive " Device (Sit-Stand Transfers) walker, front-wheeled   Gait/Stairs (Locomotion)   [place] Level (Gait) supervision;verbal cues;minimum assist (75% patient effort)   Assistive Device (Gait) other (see comments)  (none)   Distance in Feet (Gait) 5   Pattern (Gait) step-to   Deviations/Abnormal Patterns (Gait) gait speed decreased;antalgic   Maintains Weight-bearing Status (Gait) able to maintain   Clinical Impression   Criteria for Skilled Therapeutic Intervention Yes, treatment indicated   PT Diagnosis (PT) Impaired functional mobility   Influenced by the following impairments weakness, decreased ROM, impaired balance, pain   Functional limitations due to impairments gait, stairs, transfers   Clinical Presentation (PT Evaluation Complexity) stable   Clinical Presentation Rationale pt presents as medically diagnosed   Clinical Decision Making (Complexity) low complexity   Planned Therapy Interventions (PT) balance training;gait training;home exercise program;ROM (range of motion);strengthening;transfer training   Risk & Benefits of therapy have been explained evaluation/treatment results reviewed;patient   PT Total Evaluation Time   PT Eval, Low Complexity Minutes (43938) 10   Physical Therapy Goals   PT Frequency Daily   PT Predicted Duration/Target Date for Goal Attainment 10/28/23   PT Goals Transfers;Gait   PT: Transfers Modified independent;Sit to/from stand;Assistive device   PT: Gait Modified independent;Rolling walker;150 feet   Interventions   Interventions Quick Adds Gait Training;Therapeutic Activity   Therapeutic Activity   Symptoms Noted During/After Treatment Fatigue   Treatment Detail/Skilled Intervention Sit to/from stand x1: cueing for safety/hand placement/FWW management, CGA   Gait Training   Gait Training Minutes (95551) 8   Symptoms Noted During/After Treatment (Gait Training) fatigue;increased pain   Treatment Detail/Skilled Intervention cueing for safety/posture/walker management/keeping  walker close - significant improvement noted in gait with addition of FWW   Distance in Feet 150   [place] Level (Gait Training) contact guard   Physical Assistance Level (Gait Training) supervision;verbal cues   Weight Bearing (Gait Training) full weight-bearing   Assistive Device (Gait Training) rolling walker   Pattern Analysis (Gait Training) swing-through gait   Gait Analysis Deviations decreased heron   Impairments (Gait Analysis/Training) pain;ROM decreased;strength decreased   PT Discharge Planning   PT Plan gait with FWW, stairs, LE strengthening   PT Discharge Recommendation (DC Rec) home with assist;home with home care physical therapy   PT Rationale for DC Rec pending safe stair trial & FWW at home - anticipate pt can d/c home safety with home PT for improving strengthening, tolerance for activity, and transition back to no AD   PT Brief overview of current status CGA with ' gait and sit to/from stand transfers   PT Equipment Needed at Discharge walker, rolling   Total Session Time   Timed Code Treatment Minutes 8   Total Session Time (sum of timed and untimed services) 18

## 2023-10-26 NOTE — PROGRESS NOTES
Care Management Follow Up    Length of Stay (days): 5    Expected Discharge Date: 10/27/2023    Concerns to be Addressed:  Infection, IV antibiotics, pain-prn IV pain med, therapy recs       Patient plan of care discussed at interdisciplinary rounds: Yes     Anticipated Discharge Disposition:  home     Anticipated Discharge Services:  TBD     Anticipated Discharge DME:  TBD        Additional Information:  Patient presenting with with back pain which started about 2 days ago. Lumbar septic arthritis. History of Recurrent UTI . Urology, ID, Neurosurgery and Pain management consulted.      Per bed side nurse patient is ambulating in room without difficulty.        Social History:  Pt lives in a home alone usually, but her grandson Zachariah is currently staying with pt. Patient is Ind at baseline with all ADLs, and IADLs. Pt is retired, but does work part time. Pt drives. Sees PCP Dr. Holden at [facility]. No svcs prior. No mobility aides.      [facility] following.  Benefits verified. Pt has coverage for IV antibiotics under her [employer] plan.  Pt has met her $250 deductible.  She has 95/5 coverage until her out of pocket of $1700 is met (has met $679 so far).  Once met, she should have 100% coverage.        10/26/23:  Anticipating return home. Final discharge needs pending antibiotic recommendations at discharge.        Erica Awad RN

## 2023-10-26 NOTE — PLAN OF CARE
Patient is alert and oriented x 4. Pleasant. Independent in room. Continue to complain of lower back pain. Patient rated her back pain at 6-7/10 on the pain scale.. She declined prn Oxycodone and Morphine stating that she does not feel good with the above medications. She took prn tylenol 975 mg.  Blood sugars 264 and 154. Received insulin coverage per sliding scale at supper time and Lantus insulin at bedtime. Patient reported no bowel movement x 4 days. Prune juice given at bedtime.  Problem: Pain Acute  Goal: Optimal Pain Control and Function  Outcome: Progressing     Problem: Mobility Impairment  Goal: Optimal Mobility  Outcome: Progressing    Goal Outcome Evaluation:

## 2023-10-26 NOTE — PROGRESS NOTES
"[facility] Progress Note    SUBJECTIVE:  Probably about the same.  Family with many questions.    REVIEW OF SYSTEMS:  Negative unless as listed above.  Social history, Family history, Medications: reviewed.    OBJECTIVE:  /73   Pulse 80   Temp 98.5  F (36.9  C) (Oral)   Resp 16   Ht 1.575 m (5' 2\")   Wt 73.5 kg (162 lb)   SpO2 98%   BMI 29.63 kg/m                PHYSICAL EXAM:  Alert, awake  Vitals tabulated above, reviewed  Neck supple without lymphadenopathy  Sclera normal color, not injected  CARDIOVASCULAR regular rate and rhythm, no murmur  Lungs CLEAR TO AUSCULTATION   Abdomen soft, NT/ND, absent HEPATOSPLENOMEGALY  Skin normal  Joints normal  Neurologic exam axial pain, as would expect  Antibiotics:    Pertinent labs:  Lab Results   Component Value Date    WBC 5.6 10/22/2023     Lab Results   Component Value Date    RBC 3.61 10/22/2023     Lab Results   Component Value Date    HGB 11.4 10/22/2023     Lab Results   Component Value Date    HCT 34.7 10/22/2023     No components found for: \"MCT\"  Lab Results   Component Value Date    MCV 96 10/22/2023     Lab Results   Component Value Date    MCH 31.6 10/22/2023     Lab Results   Component Value Date    MCHC 32.9 10/22/2023     Lab Results   Component Value Date    RDW 12.6 10/22/2023     Lab Results   Component Value Date     10/22/2023       Last Comprehensive Metabolic Panel:  Sodium   Date Value Ref Range Status   10/26/2023 139 135 - 145 mmol/L Final     Comment:     Reference intervals for this test were updated on 09/26/2023 to more accurately reflect our healthy population. There may be differences in the flagging of prior results with similar values performed with this method. Interpretation of those prior results can be made in the context of the updated reference intervals.      Potassium   Date Value Ref Range Status   10/26/2023 3.8 3.4 - 5.3 mmol/L Final   04/19/2022 4.0 3.5 - 5.0 mmol/L Final     Chloride   Date " "Value Ref Range Status   10/26/2023 105 98 - 107 mmol/L Final   04/19/2022 99 98 - 107 mmol/L Final     Carbon Dioxide (CO2)   Date Value Ref Range Status   10/26/2023 23 22 - 29 mmol/L Final   04/19/2022 22 22 - 31 mmol/L Final     Anion Gap   Date Value Ref Range Status   10/26/2023 11 7 - 15 mmol/L Final   04/19/2022 11 5 - 18 mmol/L Final     Glucose   Date Value Ref Range Status   10/26/2023 118 (H) 70 - 99 mg/dL Final   04/19/2022 145 (H) 70 - 125 mg/dL Final     GLUCOSE BY METER POCT   Date Value Ref Range Status   10/26/2023 138 (H) 70 - 99 mg/dL Final     Urea Nitrogen   Date Value Ref Range Status   10/26/2023 15.5 8.0 - 23.0 mg/dL Final   04/19/2022 17 8 - 22 mg/dL Final     Creatinine   Date Value Ref Range Status   10/26/2023 1.56 (H) 0.51 - 0.95 mg/dL Final     GFR Estimate   Date Value Ref Range Status   10/26/2023 37 (L) >60 mL/min/1.73m2 Final     Calcium   Date Value Ref Range Status   10/26/2023 9.1 8.8 - 10.2 mg/dL Final       Liver Function Studies -   Recent Labs   Lab Test 02/07/22  0830   PROTTOTAL 6.6   ALBUMIN 3.5   BILITOTAL 0.2   ALKPHOS 61   AST 20   ALT 20       No results found for: \"SED\"    CRP   Date Value Ref Range Status   02/07/2022 0.5 0.0-<0.8 mg/dL Final               MICROBIOLOGY DATA:    Bx micro pending    IMAGING/RADIOLOGY:    FINDINGS:   BLADDER: No extravasated urinary bladder contrast identified. No definite communication/fistula with adjacent bowel or the vagina. No communication/fistula is seen with the uterus.     ADDITIONAL FINDINGS: No pelvic masses or adenopathy. Nonspecific bowel gas pattern. Vascular calcifications common iliac arteries. Appendix is visualized and appears within normal limits. No inguinal masses or adenopathy.                                                                      IMPRESSION:  1.  No evidence for urinary bladder fistulous communication. Please see above report.      ASSESSMENT:  Discitis/facet arthritis  No clear urologic " explanation by CT cysto  Recurrent UTIs since 2020    RECOMMENDATION:  Follow results  PICC in  Vanco and ctx times 6-8 wks  I probably WILL MRI her at a month out, unless numbers are terrific.  I usually don't repeat imaging on these...  ART Luther MD  Office [phone] option 2 to desk staff

## 2023-10-26 NOTE — PROGRESS NOTES
[facility]    Medicine Progress Note - Hospitalist Service    Date of Admission:  10/21/2023    Assessment & Plan   Ana Bunn is a 62 year old female with PMHx significant for HTN, CKD3, DM2, GERD, mild TBI, and anemia who presented with 2 days of acute back pain.     Lumbar spine septic arthritis, discitis  Acute non-traumatic back pain  -Denies injury/trauma recently or in the past, had a fall 1 year ago and broke arm, but not back injury  -on admission WBC WNL, CRP 62.5  -Lumbar MRI shows edema around L3/L4 and L4/L5 facet joints with extension dorsally and appear consistent with septic arthritis at L3-L4 and L4-L5 with extension into the dorsal epidural space with early dorsal epidural phlegmon in those areas.    -Neurosurgery consulted, no drainable abscess, have signed off  -ID consulted, appreciate recs  -Continue vancomycin and ceftriaxone  -IR consult for aspiration today 10/22, Culture pending, anaerobic culture transported incorrectly and may not be accurate  -Pain management, continue to titrate off IV   -PICC line placed 10/23    Insulin-dependent type 2 diabetes  -Hemoglobin A1c 10/21: 8.3%  -Is on 12 to 13 units of glargine at night  -now that she is eating better will go back to home dosing lantus 12 units  -Sliding scale insulin with meals and at bedtime  -Hypoglycemic protocol in place    Essential hypertension  -Does not appear to be on medications outpatient  -Continue amlodipine 5 mg  -adding lisinopril    VIKTOR (resolved) on CKD stage 3b  -Baseline over the last year appears to fluctuate between 1.4 and 2.2.  -On admission 1.63 with increased to 1.9 10/22, now improving  -We will monitor and avoid nephrotoxic agents as possible    History of Recurrent UTI  -Recently diagnosed UTI, UA in the clinic this week showed pyuria and she was started on Keflex.  Unable to see urine culture.  Was started on keflex which has been held  -Has had 6 episodes of dysuria in the past  "year with 5 of those episodes positive for E. coli on urine culture  -CT urogram 10/23: No evidence of bladder fistulous communication  -Urology consulted, appreciate assistance    Tobacco abuse  -Counseled on smoking cessation by respiratory therapy          Diet: Moderate Consistent Carb (60 g CHO per Meal) Diet    DVT Prophylaxis: Pneumatic Compression Devices  Rg Catheter: Not present  Lines: PRESENT      PICC 10/23/23 Single Lumen Right Basilic antibiotics-Site Assessment: WDL      Cardiac Monitoring: None  Code Status: Full Code      Clinically Significant Risk Factors                  # Hypertension: Noted on problem list       # DMII: A1C = 8.3 % (Ref range: <5.7 %) within past 6 months   # Overweight: Estimated body mass index is 29.63 kg/m  as calculated from the following:    Height as of this encounter: 1.575 m (5' 2\").    Weight as of this encounter: 73.5 kg (162 lb).             Disposition Plan      Expected Discharge Date: 10/27/2023    Discharge Delays: IV Medication - consider oral or Home Infusion  Specialist Consult (enter specialist & decision needed in comments)  Lab Result Pending (enter specific test & time in comments)  Destination: home              Mary Meraz MD  Hospitalist Service  [facility]  Securely message with [employer] (more info)  Text page via [employer] Paging/Directory   ______________________________________________________________________    Interval History   Ms. Bunn is doing well today.  She was up with PT walking and looking good.  She would like to try tramadol instead of oxycodone.  Will try that as were trying to taper her off of IV meds.  Blood pressure is still uncontrolled.  We will add lisinopril.    Physical Exam   Vital Signs: Temp: 98.4  F (36.9  C) Temp src: Oral BP: (!) 169/80 Pulse: 78   Resp: 16 SpO2: 98 % O2 Device: None (Room air)    Weight: 162 lbs 0 oz    General Appearance: Awake, alert, in no acute " distress  Respiratory: CTAB, no wheeze  Cardiovascular: RRR, no murmur noted  GI: soft, nontender, non distended, normal bowel sounds  Skin: no jaundice, no rash      Medical Decision Making       45 MINUTES SPENT BY ME on the date of service doing chart review, history, exam, documentation & further activities per the note.      Data     I have personally reviewed the following data over the past 24 hrs:    N/A  \   N/A   / N/A     139 105 15.5 /  209 (H)   3.8 23 1.56 (H) \       Imaging results reviewed over the past 24 hrs:   No results found for this or any previous visit (from the past 24 hour(s)).

## 2023-10-26 NOTE — PLAN OF CARE
[id number]  Patient Aox4. Vss on RA. Denies chest pain, n/v and sob. Up ind, steady gait. Prn medication given for back pain, see MAR. VERONIKAE PICC in place, wnl. Iv abx.   Pt reports feeling uncomfortable and nervous to go home d/t living alone. MD notified; PT/OT orders placed.     Goal Outcome Evaluation:  Problem: Pain Acute  Goal: Optimal Pain Control and Function  Intervention: Prevent or Manage Pain  Recent Flowsheet Documentation  Taken 10/26/2023 0911 by Ernestine Alvarado, RN  Medication Review/Management: medications reviewed  Problem: Infection  Goal: Absence of Infection Signs and Symptoms  Outcome: Progressing

## 2023-10-26 NOTE — PLAN OF CARE
"  Problem: Adult Inpatient Plan of Care  Goal: Plan of Care Review  Description: The Plan of Care Review/Shift note should be completed every shift.  The Outcome Evaluation is a brief statement about your assessment that the patient is improving, declining, or no change.  This information will be displayed automatically on your shift  note.  Outcome: Progressing  Goal: Patient-Specific Goal (Individualized)  Description: You can add care plan individualizations to a care plan. Examples of Individualization might be:  \"Parent requests to be called daily at 9am for status\", \"I have a hard time hearing out of my right ear\", or \"Do not touch me to wake me up as it startles  me\".  Outcome: Progressing  Goal: Absence of Hospital-Acquired Illness or Injury  Outcome: Progressing  Intervention: Identify and Manage Fall Risk  Recent Flowsheet Documentation  Taken 10/26/2023 0031 by Umu Reynaga RN  Safety Promotion/Fall Prevention: activity supervised  Intervention: Prevent Skin Injury  Recent Flowsheet Documentation  Taken 10/26/2023 0031 by Umu Reynaga RN  Body Position:   position changed independently   left  Intervention: Prevent and Manage VTE (Venous Thromboembolism) Risk  Recent Flowsheet Documentation  Taken 10/26/2023 0031 by Umu Reynaga RN  VTE Prevention/Management: compression stockings off  Intervention: Prevent Infection  Recent Flowsheet Documentation  Taken 10/26/2023 0031 by Umu Reynaga RN  Infection Prevention:   hand hygiene promoted   rest/sleep promoted  Goal: Optimal Comfort and Wellbeing  Outcome: Progressing  Intervention: Monitor Pain and Promote Comfort  Recent Flowsheet Documentation  Taken 10/26/2023 0054 by Umu Reynaga RN  Pain Management Interventions: emotional support  Taken 10/26/2023 0031 by Umu Reynaga RN  Pain Management Interventions: medication (see MAR)  Goal: Readiness for Transition of Care  Outcome: Progressing   Goal Outcome Evaluation:       " Pt a/o with increased pain tonight. Pt given prn iv morphine. Pt sleeping between cares, will monitor.

## 2023-10-27 ENCOUNTER — APPOINTMENT (OUTPATIENT)
Dept: OCCUPATIONAL THERAPY | Facility: HOSPITAL | Age: 63
DRG: 552 | End: 2023-10-27
Attending: INTERNAL MEDICINE
Payer: COMMERCIAL

## 2023-10-27 VITALS
DIASTOLIC BLOOD PRESSURE: 72 MMHG | RESPIRATION RATE: 16 BRPM | HEIGHT: 62 IN | BODY MASS INDEX: 29.81 KG/M2 | WEIGHT: 162 LBS | TEMPERATURE: 98 F | OXYGEN SATURATION: 98 % | HEART RATE: 83 BPM | SYSTOLIC BLOOD PRESSURE: 139 MMHG

## 2023-10-27 LAB
ANION GAP SERPL CALCULATED.3IONS-SCNC: 7 MMOL/L (ref 7–15)
BACTERIA ASPIRATE CULT: NO GROWTH
BUN SERPL-MCNC: 18.8 MG/DL (ref 8–23)
CALCIUM SERPL-MCNC: 9.3 MG/DL (ref 8.8–10.2)
CHLORIDE SERPL-SCNC: 102 MMOL/L (ref 98–107)
CREAT SERPL-MCNC: 1.7 MG/DL (ref 0.51–0.95)
CRP SERPL-MCNC: 14.8 MG/L
DEPRECATED HCO3 PLAS-SCNC: 27 MMOL/L (ref 22–29)
EGFRCR SERPLBLD CKD-EPI 2021: 34 ML/MIN/1.73M2
GLUCOSE BLDC GLUCOMTR-MCNC: 113 MG/DL (ref 70–99)
GLUCOSE BLDC GLUCOMTR-MCNC: 162 MG/DL (ref 70–99)
GLUCOSE BLDC GLUCOMTR-MCNC: 85 MG/DL (ref 70–99)
GLUCOSE SERPL-MCNC: 117 MG/DL (ref 70–99)
GRAM STAIN RESULT: NORMAL
GRAM STAIN RESULT: NORMAL
POTASSIUM SERPL-SCNC: 3.7 MMOL/L (ref 3.4–5.3)
SODIUM SERPL-SCNC: 136 MMOL/L (ref 135–145)

## 2023-10-27 PROCEDURE — 99232 SBSQ HOSP IP/OBS MODERATE 35: CPT | Performed by: INTERNAL MEDICINE

## 2023-10-27 PROCEDURE — 86140 C-REACTIVE PROTEIN: CPT | Performed by: INTERNAL MEDICINE

## 2023-10-27 PROCEDURE — 97165 OT EVAL LOW COMPLEX 30 MIN: CPT | Mod: GO

## 2023-10-27 PROCEDURE — 250N000011 HC RX IP 250 OP 636: Mod: JZ | Performed by: INTERNAL MEDICINE

## 2023-10-27 PROCEDURE — 80048 BASIC METABOLIC PNL TOTAL CA: CPT | Performed by: INTERNAL MEDICINE

## 2023-10-27 PROCEDURE — 250N000013 HC RX MED GY IP 250 OP 250 PS 637: Performed by: EMERGENCY MEDICINE

## 2023-10-27 PROCEDURE — 99239 HOSP IP/OBS DSCHRG MGMT >30: CPT | Performed by: INTERNAL MEDICINE

## 2023-10-27 PROCEDURE — 97535 SELF CARE MNGMENT TRAINING: CPT | Mod: GO

## 2023-10-27 PROCEDURE — 250N000013 HC RX MED GY IP 250 OP 250 PS 637: Performed by: INTERNAL MEDICINE

## 2023-10-27 RX ORDER — CEFTRIAXONE 2 G/1
2 INJECTION, POWDER, FOR SOLUTION INTRAMUSCULAR; INTRAVENOUS EVERY 24 HOURS
Qty: 960 ML | Refills: 0 | Status: SHIPPED | OUTPATIENT
Start: 2023-10-28 | End: 2023-10-27

## 2023-10-27 RX ORDER — AMLODIPINE BESYLATE 5 MG/1
5 TABLET ORAL DAILY
Qty: 30 TABLET | Refills: 0 | Status: SHIPPED | OUTPATIENT
Start: 2023-10-28 | End: 2023-11-27

## 2023-10-27 RX ORDER — VANCOMYCIN HYDROCHLORIDE 1 G/200ML
1000 INJECTION, SOLUTION INTRAVENOUS EVERY 24 HOURS
Qty: 9800 ML | Refills: 0 | Status: SHIPPED | OUTPATIENT
Start: 2023-10-27 | End: 2023-12-15

## 2023-10-27 RX ORDER — CEFTRIAXONE 2 G/1
2 INJECTION, POWDER, FOR SOLUTION INTRAMUSCULAR; INTRAVENOUS EVERY 24 HOURS
Qty: 960 ML | Refills: 0 | Status: SHIPPED | OUTPATIENT
Start: 2023-10-28 | End: 2023-12-15

## 2023-10-27 RX ORDER — LISINOPRIL 10 MG/1
10 TABLET ORAL DAILY
Qty: 30 TABLET | Refills: 0 | Status: SHIPPED | OUTPATIENT
Start: 2023-10-27 | End: 2023-11-26

## 2023-10-27 RX ORDER — TRAMADOL HYDROCHLORIDE 50 MG/1
50 TABLET ORAL EVERY 6 HOURS PRN
Qty: 28 TABLET | Refills: 0 | Status: SHIPPED | OUTPATIENT
Start: 2023-10-27 | End: 2023-11-03

## 2023-10-27 RX ORDER — VANCOMYCIN HYDROCHLORIDE 1 G/200ML
1000 INJECTION, SOLUTION INTRAVENOUS EVERY 24 HOURS
Qty: 9800 ML | Refills: 0 | Status: SHIPPED | OUTPATIENT
Start: 2023-10-27 | End: 2023-10-27

## 2023-10-27 RX ORDER — TRAMADOL HYDROCHLORIDE 50 MG/1
50 TABLET ORAL EVERY 6 HOURS PRN
Qty: 20 TABLET | Refills: 0 | Status: SHIPPED | OUTPATIENT
Start: 2023-10-27 | End: 2023-10-27

## 2023-10-27 RX ADMIN — TRAMADOL HYDROCHLORIDE 50 MG: 50 TABLET, COATED ORAL at 14:11

## 2023-10-27 RX ADMIN — LISINOPRIL 5 MG: 5 TABLET ORAL at 08:26

## 2023-10-27 RX ADMIN — THERA TABS 1 TABLET: TAB at 08:27

## 2023-10-27 RX ADMIN — THIAMINE HCL TAB 100 MG 100 MG: 100 TAB at 08:27

## 2023-10-27 RX ADMIN — AMLODIPINE BESYLATE 5 MG: 5 TABLET ORAL at 08:26

## 2023-10-27 RX ADMIN — CEFTRIAXONE SODIUM 2 G: 2 INJECTION, POWDER, FOR SOLUTION INTRAMUSCULAR; INTRAVENOUS at 08:27

## 2023-10-27 RX ADMIN — TRAMADOL HYDROCHLORIDE 50 MG: 50 TABLET, COATED ORAL at 08:26

## 2023-10-27 RX ADMIN — VANCOMYCIN HYDROCHLORIDE 1000 MG: 1 INJECTION, SOLUTION INTRAVENOUS at 11:00

## 2023-10-27 RX ADMIN — Medication 25 MCG: at 08:27

## 2023-10-27 ASSESSMENT — ACTIVITIES OF DAILY LIVING (ADL)
ADLS_ACUITY_SCORE: 21

## 2023-10-27 NOTE — PLAN OF CARE
Problem: Adult Inpatient Plan of Care  Goal: Optimal Comfort and Wellbeing  Outcome: Progressing  Intervention: Monitor Pain and Promote Comfort  Recent Flowsheet Documentation  Taken 10/27/2023 0814 by Francheska Freeman, RN  Pain Management Interventions: medication (see MAR)     Problem: Mobility Impairment  Goal: Optimal Mobility  Outcome: Progressing     Problem: Infection  Goal: Absence of Infection Signs and Symptoms  Outcome: Progressing   Goal Outcome Evaluation:       Patient continues on IV Antibiotics will be going home with PICC to right upper arm. Nurse here educating family on use of PICC. Patient complained of back pain 6-7 /10 Given PRN oxycodone.   Many family members here and received explantation per this writer and MD of patients situation.

## 2023-10-27 NOTE — PROGRESS NOTES
Physical Therapy Discharge Summary    Reason for therapy discharge:    Discharged to home.    Progress towards therapy goal(s). See goals on Care Plan in [facility] electronic health record for goal details.  Goals not met.  Barriers to achieving goals:   discharge from facility.    Therapy recommendation(s):    Continued therapy is recommended.  Rationale/Recommendations:  home PT.

## 2023-10-27 NOTE — PLAN OF CARE
Problem: Adult Inpatient Plan of Care  Goal: Absence of Hospital-Acquired Illness or Injury  Intervention: Identify and Manage Fall Risk  Recent Flowsheet Documentation  Taken 10/26/2023 2345 by Asha Maddox, RN  Safety Promotion/Fall Prevention: assistive device/personal items within reach     Problem: Adult Inpatient Plan of Care  Goal: Absence of Hospital-Acquired Illness or Injury  Intervention: Prevent Skin Injury  Recent Flowsheet Documentation  Taken 10/26/2023 2345 by Asha Maddox, RN  Body Position: position changed independently     Problem: Pain Acute  Goal: Optimal Pain Control and Function  Intervention: Prevent or Manage Pain  Recent Flowsheet Documentation  Taken 10/26/2023 2345 by Asha Maddox, RN  Medication Review/Management: medications reviewed   Goal Outcome Evaluation:  Patient denied any pain this shift. Slept between cares. Blood sugar 85, she was given cracker and peanut butter.

## 2023-10-27 NOTE — PROGRESS NOTES
"Occupational Therapy     10/27/23 0915   Appointment Info   Signing Clinician's Name / Credentials (OT) JON Goldman   Student Supervision On-site supervision provided   Living Environment   People in Home alone  (Pt reports family will be staying w/ her for a bit after d/c)   Current Living Arrangements house   Home Accessibility stairs to enter home   Number of Stairs, Main Entrance greater than 10 stairs   Transportation Anticipated family or friend will provide   Living Environment Comments Tub/shwr w/ SC no GB, regular toilet w/ vanity, long handled loofah   Self-Care   Usual Activity Tolerance good   Current Activity Tolerance moderate   Equipment Currently Used at Home none  (Interested in fww)   Fall history within last six months no   Activity/Exercise/Self-Care Comment Baseline indp w/ ADLs/IADLs-strong support system from family   General Information   Onset of Illness/Injury or Date of Surgery 10/21/23   Referring Physician Mary Meraz MD   Additional Occupational Profile Info/Pertinent History of Current Problem \" 62 year old female with PMHx significant for HTN, CKD3, DM2, GERD, mild TBI, and anemia who presented with 2 days of acute back pain.\"   Existing Precautions/Restrictions no known precautions/restrictions   Cognitive Status Examination   Orientation Status orientation to person, place and time   Follows Commands WNL   Sensory   Sensory Comments Not tested   Pain Assessment   Patient Currently in Pain Yes, see Vital Sign flowsheet  (Back pain at 7/10)   Posture   Posture   (flexed posture)   Range of Motion Comprehensive   Comment, General Range of Motion Not tested but UE seems WFL   Strength Comprehensive (MMT)   Comment, General Manual Muscle Testing (MMT) Assessment Not tested but UE seems WFL   Bed Mobility   Bed Mobility rolling left;rolling right   Comment (Bed Mobility) Pt demo log roll-mod I   Transfers   Transfers sit-stand transfer;toilet transfer   Sit-Stand " Transfer   Sit/Stand Transfer Comments STS from EOB w/ fww SBA   Toilet Transfer   Toilet Transfer Comments Trfr from SC in BR w/ fww supervision-no concerns   Balance   Balance Assessment standing dynamic balance   Balance Comments Slight unsteadiness amb   Activities of Daily Living   BADL Assessment/Intervention lower body dressing   Lower Body Dressing Assessment/Training   Comment, (Lower Body Dressing) Seated at EOB pt demo figure 4 for socks, able to demo w/ LLE but unable to demo w/ RLE d/t pain   Clinical Impression   Criteria for Skilled Therapeutic Interventions Met (OT) Yes, treatment indicated   OT Diagnosis decreased ADLs   OT Problem List-Impairments impacting ADL problems related to;activity tolerance impaired;mobility;pain   Assessment of Occupational Performance 1-3 Performance Deficits   Identified Performance Deficits ADLs, mobility, activity tolerance d/t pain   Planned Therapy Interventions (OT) ADL retraining;IADL retraining;transfer training   Clinical Decision Making Complexity (OT) problem focused assessment/low complexity   Risk & Benefits of therapy have been explained evaluation/treatment results reviewed;care plan/treatment goals reviewed;current/potential barriers reviewed;participants voiced agreement with care plan;patient;daughter  (grd dgtr)   OT Total Evaluation Time   OT Eval, Low Complexity Minutes (73659) 15   OT Goals   Therapy Frequency (OT) One time eval and treatment   OT Predicted Duration/Target Date for Goal Attainment 11/03/23   OT Goals Transfers   OT: Transfer Modified independent   Self-Care/Home Management   Self-Care/Home Mgmt/ADL, Compensatory, Meal Prep Minutes (42819) 30   Symptoms Noted During/After Treatment (Meal Preparation/Planning Training) increased pain   Treatment Detail/Skilled Intervention During session pt educ spinal precautions to decrease pain (limit bending/twisting/lifting) and increase ADL performance/activity tolerance. Reviewed/demo w/ pt  safe car trfr, tub trfr, proper body mechanics for ADLs/IADLs, AE (reacher, SA, long handled sponge), and log roll. Pt verbalized/demo understanding and informed therapist family will help w/ ADLs/IADLs prn. Pt amb w/in room w/ FWW supervision and demo multiple ADL tasks-no concerns. End of session pt sup in bed-hand off to doctor, family in room. Pt has strong support system and understanding of managing pain leading to no further skilled OT needs at this time.   OT Discharge Planning   OT Plan D/C OT   OT Discharge Recommendation (DC Rec) home with assist   OT Rationale for DC Rec Pt close to baseline and has strong family support system. Pt reports lives alone but family will be staying w/ her for a bit once d/c. Pt educ/demo/reviewed proper body mechanics and recs for back pain verbalizing understanding. At this time pt safe to return home w/ family support prn and requires no further skilled OT needs at this time.   OT Brief overview of current status D/C OT   Total Session Time   Timed Code Treatment Minutes 30   Total Session Time (sum of timed and untimed services) 45

## 2023-10-27 NOTE — PLAN OF CARE
"Goal Outcome Evaluation:                        Problem: Adult Inpatient Plan of Care  Goal: Plan of Care Review  Description: The Plan of Care Review/Shift note should be completed every shift.  The Outcome Evaluation is a brief statement about your assessment that the patient is improving, declining, or no change.  This information will be displayed automatically on your shift  note.  Outcome: Met  Goal: Patient-Specific Goal (Individualized)  Description: You can add care plan individualizations to a care plan. Examples of Individualization might be:  \"Parent requests to be called daily at 9am for status\", \"I have a hard time hearing out of my right ear\", or \"Do not touch me to wake me up as it startles  me\".  Outcome: Met  Goal: Absence of Hospital-Acquired Illness or Injury  Outcome: Met  Goal: Optimal Comfort and Wellbeing  Outcome: Met  Goal: Readiness for Transition of Care  Outcome: Met     Problem: Pain Acute  Goal: Optimal Pain Control and Function  Outcome: Met     Problem: Mobility Impairment  Goal: Optimal Mobility  Outcome: Met     Problem: Infection  Goal: Absence of Infection Signs and Symptoms  Outcome: Met   Pt received all of her discharge paperwork and belongings. Pt will leave the unit with family at around 1445. Continue to monitor pt.  "

## 2023-10-27 NOTE — PROGRESS NOTES
"[facility] Progress Note    SUBJECTIVE:  Family here.  Slowly better.  Will send a CRP again today.  Her pain is more than we usually see with this condition    REVIEW OF SYSTEMS:  Negative unless as listed above.  Social history, Family history, Medications: reviewed.    OBJECTIVE:  BP (!) 152/78   Pulse 76   Temp 98.3  F (36.8  C) (Oral)   Resp 16   Ht 1.575 m (5' 2\")   Wt 73.5 kg (162 lb)   SpO2 97%   BMI 29.63 kg/m                PHYSICAL EXAM:  Alert, awake  Vitals tabulated above, reviewed  Neck supple without lymphadenopathy  Sclera normal color, not injected  CARDIOVASCULAR regular rate and rhythm, no murmur  Lungs CLEAR TO AUSCULTATION   Abdomen soft, NT/ND, absent HEPATOSPLENOMEGALY  Skin normal  Joints normal  Neurologic exam axial pain, some better      Antibiotics:V/CTX    Pertinent labs:  Lab Results   Component Value Date    WBC 5.6 10/22/2023     Lab Results   Component Value Date    RBC 3.61 10/22/2023     Lab Results   Component Value Date    HGB 11.4 10/22/2023     Lab Results   Component Value Date    HCT 34.7 10/22/2023     No components found for: \"MCT\"  Lab Results   Component Value Date    MCV 96 10/22/2023     Lab Results   Component Value Date    MCH 31.6 10/22/2023     Lab Results   Component Value Date    MCHC 32.9 10/22/2023     Lab Results   Component Value Date    RDW 12.6 10/22/2023     Lab Results   Component Value Date     10/22/2023       Last Comprehensive Metabolic Panel:  Sodium   Date Value Ref Range Status   10/27/2023 136 135 - 145 mmol/L Final     Comment:     Reference intervals for this test were updated on 09/26/2023 to more accurately reflect our healthy population. There may be differences in the flagging of prior results with similar values performed with this method. Interpretation of those prior results can be made in the context of the updated reference intervals.      Potassium   Date Value Ref Range Status   10/27/2023 3.7 3.4 - 5.3 " "mmol/L Final   04/19/2022 4.0 3.5 - 5.0 mmol/L Final     Chloride   Date Value Ref Range Status   10/27/2023 102 98 - 107 mmol/L Final   04/19/2022 99 98 - 107 mmol/L Final     Carbon Dioxide (CO2)   Date Value Ref Range Status   10/27/2023 27 22 - 29 mmol/L Final   04/19/2022 22 22 - 31 mmol/L Final     Anion Gap   Date Value Ref Range Status   10/27/2023 7 7 - 15 mmol/L Final   04/19/2022 11 5 - 18 mmol/L Final     Glucose   Date Value Ref Range Status   10/27/2023 117 (H) 70 - 99 mg/dL Final   04/19/2022 145 (H) 70 - 125 mg/dL Final     GLUCOSE BY METER POCT   Date Value Ref Range Status   10/27/2023 113 (H) 70 - 99 mg/dL Final     Urea Nitrogen   Date Value Ref Range Status   10/27/2023 18.8 8.0 - 23.0 mg/dL Final   04/19/2022 17 8 - 22 mg/dL Final     Creatinine   Date Value Ref Range Status   10/27/2023 1.70 (H) 0.51 - 0.95 mg/dL Final     GFR Estimate   Date Value Ref Range Status   10/27/2023 34 (L) >60 mL/min/1.73m2 Final     Calcium   Date Value Ref Range Status   10/27/2023 9.3 8.8 - 10.2 mg/dL Final       Liver Function Studies -   Recent Labs   Lab Test 02/07/22  0830   PROTTOTAL 6.6   ALBUMIN 3.5   BILITOTAL 0.2   ALKPHOS 61   AST 20   ALT 20       No results found for: \"SED\"    CRP   Date Value Ref Range Status   02/07/2022 0.5 0.0-<0.8 mg/dL Final               MICROBIOLOGY DATA:    Bx micro pending    IMAGING/RADIOLOGY:    FINDINGS:   BLADDER: No extravasated urinary bladder contrast identified. No definite communication/fistula with adjacent bowel or the vagina. No communication/fistula is seen with the uterus.     ADDITIONAL FINDINGS: No pelvic masses or adenopathy. Nonspecific bowel gas pattern. Vascular calcifications common iliac arteries. Appendix is visualized and appears within normal limits. No inguinal masses or adenopathy.                                                                      IMPRESSION:  1.  No evidence for urinary bladder fistulous communication. Please see above " report.      ASSESSMENT:  Discitis/facet arthritis  No clear urologic explanation by CT cysto  Recurrent UTIs since 2020    RECOMMENDATION:  Home today  CRP today  See me Tuesday before thanksgiving  Vanco and ctx times 6-8 wks  I probably WILL MRI her at a month out, unless numbers are terrific.  I usually don't repeat imaging on these...      ART Luther MD  Office [phone] option 2 to desk staff

## 2023-10-27 NOTE — PLAN OF CARE
Occupational Therapy Discharge Summary    Reason for therapy discharge:    All goals and outcomes met, no further needs identified.    Progress towards therapy goal(s). See goals on Care Plan in [facility] electronic health record for goal details.  Goals met    Therapy recommendation(s):    No further therapy is recommended.

## 2023-10-27 NOTE — PLAN OF CARE
Patient spent a fair shift. Pain and discomfort managed with prn Tramadol 50 mg. Patient reports moving better with less pain after the Tramadol. Patient showered  this shift. Blood sugars 209 and 230. Insulin coverage per sliding scale.  Problem: Pain Acute  Goal: Optimal Pain Control and Function  Outcome: Progressing  Intervention: Prevent or Manage Pain  Recent Flowsheet Documentation  Taken 10/26/2023 1600 by Jada Apple RN  Bowel Elimination Promotion: adequate fluid intake promoted  Medication Review/Management: medications reviewed   Goal Outcome Evaluation:

## 2023-10-27 NOTE — PROGRESS NOTES
"Care Management Follow Up    Length of Stay (days): 6    Expected Discharge Date: 10/27/2023    Concerns to be Addressed:   Planning for IV antibiotics (Per ID notes, \"Vanco and ctx (Rocephin) times 6-8 weeks\". PICC line has been placed.   Patient plan of care discussed at interdisciplinary rounds: Yes    Anticipated Discharge Disposition:  Home.     Anticipated Discharge Services:  Home Infusion;  Anticipated Discharge DME:  Per therapy (if indicated).    Patient/family educated on Medicare website which has current facility and service quality ratings:   NA at this time.   Education Provided on the Discharge Plan:   Per team  Patient/Family in Agreement with the Plan:   Yes    Referrals Placed by CM/SW:  [facility];   Private pay costs discussed:  Home infusion  will review with patient.     Additional Information:  Per chart review, patient lives in a home alone usually but her grandson, Zachariah, is currently staying with her. Patient is independent with all activities of daily living and instrumental activities of daily living (IADLs)  at baseline. Patient is retired but does work part time and does drive. Her PCP is Dr. Holden at [facility]. She had no in-home services and did not use any DME. Her sister Madelin is supportive and lives close to patient's home. A referral has been made to [facility] who is following. Update sent to [facility] liaison regarding ID recommendations. Family to transport patient home at discharge.     Madelaine Kendrick RN  "

## 2023-10-27 NOTE — PROGRESS NOTES
Care Management Discharge Note    Discharge Date: 10/27/2023       Discharge Disposition: Home Infusion, Home    Discharge Services: None    Discharge DME: None    Discharge Transportation: family or friend will provide    Private pay costs discussed: insurance costs out of pocket expenses, co-pays, deductibles, and other costs per home infusion liaison    Does the patient's insurance plan have a 3 day qualifying hospital stay waiver?  No    PAS Confirmation Code: na  Patient/family educated on Medicare website which has current facility and service quality ratings: yes    Education Provided on the Discharge Plan:  (Per home infusion)  Persons Notified of Discharge Plans: Nursing; home infusion;   Patient/Family in Agreement with the Plan: yes    Handoff Referral Completed: Yes    Additional Information:  Discharge to home with [facility].  Per home infusion liaison, teaching has been completed.   1:26 PM: (DISCHARGE NOTE REFRESHED AND BPA DID NOT FIRE)    Madelaine Kendrick RN

## 2023-10-28 ENCOUNTER — PATIENT OUTREACH (OUTPATIENT)
Dept: CARE COORDINATION | Facility: CLINIC | Age: 63
End: 2023-10-28
Payer: COMMERCIAL

## 2023-10-28 NOTE — PROGRESS NOTES
Clinic Care Coordination Contact  [facility]: Post-Discharge Note  SITUATION                                                      Admission:    Admission Date: 10/21/23   Reason for Admission: Back Pain  Discharge:   Discharge Date: 10/27/23  Discharge Diagnosis: Lumbar septic arthritis    BACKGROUND                                                      Per hospital discharge summary and inpatient provider notes:    Ana Bunn is a 62 year old female with back pain which started about 2 days ago.  She was seen in the clinic and diagnosed with a UTI but her pain worsened.  It is persistent but much worse when she is standing and moving.  No fevers or chills, no abdominal pain, no chest pain or shortness of breath.  No recent or past history of back surgery or previous back problems.     ASSESSMENT           Discharge Assessment  How are you doing now that you are home?: doing good  How are your symptoms? (Red Flag symptoms escalate to triage hotline per guidelines): Improved  Do you feel your condition is stable enough to be safe at home until your provider visit?: Yes  Does the patient have their discharge instructions? : Yes  Does the patient have questions regarding their discharge instructions? : No  Were you started on any new medications or were there changes to any of your previous medications? : Yes  Does the patient have all of their medications?: Yes  Do you have questions regarding any of your medications? : No  Do you have all of your needed medical supplies or equipment (DME)?  (i.e. oxygen tank, CPAP, cane, etc.): Yes (IV infusion supplies)  Discharge follow-up appointment scheduled within 14 calendar days? : No  Is patient agreeable to assistance with scheduling? : No (patient will call Monday to schedule)         Post-op (Clinicians Only)  Did the patient have surgery or a procedure: Yes (left L4-5 facet joint aspiration; PICC line placement)  Incision: closed  Urinary Status: voiding  "without complaint/concerns    Patient doing well. Getting \"very good\" control of back pain with tramadol. No dysuria and urine appears normal. Family providing IV infusions but she would like to do them on her own. States she was offered extension tubing but doesn't have this. Advised to contact home infusion team for assistance to obtain tubing. Needs paperwork for work completed. Advised to contact PCP or bring paperwork to follow up appointment. Using nicorette to help with smoking cessation. Last smoked one day prior to admission. Congratulated on her success. Taking new blood pressure medications as prescribed. Not checking home blood pressure readings but states she does have a monitor and will start checking and recording results. No further questions or concerns per patient. 24/7 [facility] nurse triage phone number provided to patient.       PLAN                                                      Outpatient Plan:  [facility] will call you to arrange follow up for recurrent UTIs in 2-3 weeks. You may call to confirm appointment as  needed ([phone]).    Follow up with primary care provider, Physician No Ref-Primary, within 7 days to evaluate medication change, for  hospital follow- up, and regarding new diagnosis. The following labs/tests are recommended: blood pressure monitoring    No future appointments.      For any urgent concerns, please contact our 24 hour nurse triage line: [phone] ([phone])         Paola Crockett RN              "

## 2023-10-30 ENCOUNTER — LAB REQUISITION (OUTPATIENT)
Dept: LAB | Facility: CLINIC | Age: 63
End: 2023-10-30
Payer: COMMERCIAL

## 2023-10-30 DIAGNOSIS — M46.56: ICD-10-CM

## 2023-10-30 LAB
ANION GAP SERPL CALCULATED.3IONS-SCNC: 12 MMOL/L (ref 7–15)
BASOPHILS # BLD AUTO: 0 10E3/UL (ref 0–0.2)
BASOPHILS NFR BLD AUTO: 0 %
BUN SERPL-MCNC: 18 MG/DL (ref 8–23)
CALCIUM SERPL-MCNC: 9.2 MG/DL (ref 8.8–10.2)
CHLORIDE SERPL-SCNC: 100 MMOL/L (ref 98–107)
CREAT SERPL-MCNC: 1.68 MG/DL (ref 0.51–0.95)
CRP SERPL-MCNC: 27.19 MG/L
DEPRECATED HCO3 PLAS-SCNC: 23 MMOL/L (ref 22–29)
EGFRCR SERPLBLD CKD-EPI 2021: 34 ML/MIN/1.73M2
EOSINOPHIL # BLD AUTO: 0.2 10E3/UL (ref 0–0.7)
EOSINOPHIL NFR BLD AUTO: 3 %
ERYTHROCYTE [DISTWIDTH] IN BLOOD BY AUTOMATED COUNT: 12.2 % (ref 10–15)
ERYTHROCYTE [SEDIMENTATION RATE] IN BLOOD BY WESTERGREN METHOD: 58 MM/HR (ref 0–30)
GLUCOSE SERPL-MCNC: 128 MG/DL (ref 70–99)
HCT VFR BLD AUTO: 33.6 % (ref 35–47)
HGB BLD-MCNC: 11.3 G/DL (ref 11.7–15.7)
IMM GRANULOCYTES # BLD: 0 10E3/UL
IMM GRANULOCYTES NFR BLD: 0 %
LYMPHOCYTES # BLD AUTO: 1.7 10E3/UL (ref 0.8–5.3)
LYMPHOCYTES NFR BLD AUTO: 28 %
MCH RBC QN AUTO: 31.7 PG (ref 26.5–33)
MCHC RBC AUTO-ENTMCNC: 33.6 G/DL (ref 31.5–36.5)
MCV RBC AUTO: 94 FL (ref 78–100)
MONOCYTES # BLD AUTO: 0.5 10E3/UL (ref 0–1.3)
MONOCYTES NFR BLD AUTO: 8 %
NEUTROPHILS # BLD AUTO: 3.6 10E3/UL (ref 1.6–8.3)
NEUTROPHILS NFR BLD AUTO: 61 %
NRBC # BLD AUTO: 0 10E3/UL
NRBC BLD AUTO-RTO: 0 /100
PLATELET # BLD AUTO: 273 10E3/UL (ref 150–450)
POTASSIUM SERPL-SCNC: 4 MMOL/L (ref 3.4–5.3)
RBC # BLD AUTO: 3.57 10E6/UL (ref 3.8–5.2)
SODIUM SERPL-SCNC: 135 MMOL/L (ref 135–145)
VANCOMYCIN SERPL-MCNC: 18.3 UG/ML
WBC # BLD AUTO: 6.1 10E3/UL (ref 4–11)

## 2023-10-30 PROCEDURE — 80202 ASSAY OF VANCOMYCIN: CPT | Performed by: INTERNAL MEDICINE

## 2023-10-30 PROCEDURE — 85025 COMPLETE CBC W/AUTO DIFF WBC: CPT | Performed by: INTERNAL MEDICINE

## 2023-10-30 PROCEDURE — 85652 RBC SED RATE AUTOMATED: CPT | Performed by: INTERNAL MEDICINE

## 2023-10-30 PROCEDURE — 82310 ASSAY OF CALCIUM: CPT | Performed by: INTERNAL MEDICINE

## 2023-10-30 PROCEDURE — 82374 ASSAY BLOOD CARBON DIOXIDE: CPT | Performed by: INTERNAL MEDICINE

## 2023-10-30 PROCEDURE — 86140 C-REACTIVE PROTEIN: CPT | Performed by: INTERNAL MEDICINE

## 2023-11-02 ENCOUNTER — OFFICE VISIT (OUTPATIENT)
Dept: INFECTIOUS DISEASES | Facility: CLINIC | Age: 63
End: 2023-11-02
Payer: COMMERCIAL

## 2023-11-02 ENCOUNTER — LAB (OUTPATIENT)
Dept: LAB | Facility: CLINIC | Age: 63
End: 2023-11-02
Payer: COMMERCIAL

## 2023-11-02 ENCOUNTER — LAB REQUISITION (OUTPATIENT)
Dept: LAB | Facility: HOSPITAL | Age: 63
End: 2023-11-02
Payer: COMMERCIAL

## 2023-11-02 VITALS
SYSTOLIC BLOOD PRESSURE: 138 MMHG | BODY MASS INDEX: 29.47 KG/M2 | WEIGHT: 161.1 LBS | DIASTOLIC BLOOD PRESSURE: 88 MMHG | HEART RATE: 93 BPM | OXYGEN SATURATION: 94 %

## 2023-11-02 DIAGNOSIS — M46.56: ICD-10-CM

## 2023-11-02 DIAGNOSIS — M46.56 SEPTIC ARTHRITIS OF LUMBAR SPINE (H): ICD-10-CM

## 2023-11-02 DIAGNOSIS — M46.56 SEPTIC ARTHRITIS OF LUMBAR SPINE (H): Primary | ICD-10-CM

## 2023-11-02 LAB
CREAT SERPL-MCNC: 1.72 MG/DL (ref 0.51–0.95)
EGFRCR SERPLBLD CKD-EPI 2021: 33 ML/MIN/1.73M2
VANCOMYCIN SERPL-MCNC: 17.3 UG/ML

## 2023-11-02 PROCEDURE — 80202 ASSAY OF VANCOMYCIN: CPT | Performed by: INTERNAL MEDICINE

## 2023-11-02 PROCEDURE — 82565 ASSAY OF CREATININE: CPT | Performed by: INTERNAL MEDICINE

## 2023-11-02 PROCEDURE — 86481 TB AG RESPONSE T-CELL SUSP: CPT

## 2023-11-02 PROCEDURE — 36415 COLL VENOUS BLD VENIPUNCTURE: CPT

## 2023-11-02 PROCEDURE — 99214 OFFICE O/P EST MOD 30 MIN: CPT | Performed by: INTERNAL MEDICINE

## 2023-11-02 NOTE — PROGRESS NOTES
CLINIC FOLLOW UP NOTE:    Original Reason for consultation, site patient seen:Bernadette, last week    Interval history:  Was to see this pt nov 30 but got scheduled today.  Doing better, labs a little better.  Creatinine up to 1.7 from 1.5  Can tie shoes but can't  a dropped item from the floor.  I am giving her off work to dec 4 to recover.  No rash.  On vanco and CTX.  Eats some but not tons of mexican cheese.  Micro remains totally negative from the facet bx done inpatient.            Current antibiotics and duration plan:vanco, rocephin    Reviewed PAST MEDICAL HISTORY,  family and social history      Examination:  Alert, awake  Vitals tabulated above, reviewed  Neck supple  Sclera OK  CARDIOVASCULAR regular rate and rhythm, no mumur  Lungs CLEAR TO AUSCULTATION   Abdomen soft, NT/ND, absent HEPATOSPLENOMEGALY  Skin normal  Joints normal  Neurologic exam non focal  Wound:  N/A    Lab Data/New Imaging/Medication levels/Microbiologic data:  CRP 62--->124---->14---->27  Creat 1.7    Micro neg    IMPRESSION:  Facetitis, spinal  Bx non dx    PLAN:  Some improvement physically with dual abx, also better CRP  Very fatigued, cut down q6 tram  Test for TB today, blood  Will follow  Family and pt extensively updated, 30 plus minutes of questions during visit.   See again nov 30th as intended in first place    Thank you for allowing me to continue care of this patient.    Sincerely:      ART Luther MD  [facility]  [facility] [phone]

## 2023-11-03 NOTE — DISCHARGE SUMMARY
"[facility]  Hospitalist Discharge Summary      Date of Admission:  10/21/2023  Date of Discharge:  10/27/2023  2:43 PM  Discharging Provider: Mary Meraz MD  Discharge Service: Hospitalist Service    Discharge Diagnoses   Acute lumbar septic arthritis and discitis  Insulin dependent type 2 diabetes mellitus   Essential Hypertension  VIKTOR (resolved) on CKD stage 3b  Recurrent UTI  Tobacco abuse    Clinically Significant Risk Factors     # DMII: A1C = 8.3 % (Ref range: <5.7 %) within past 6 months  # Overweight: Estimated body mass index is 29.63 kg/m  as calculated from the following:    Height as of this encounter: 1.575 m (5' 2\").    Weight as of this encounter: 73.5 kg (162 lb).       Follow-ups Needed After Discharge   Follow-up Appointments     Follow-up and recommended labs and tests       [facility] will call you to arrange follow up for recurrent UTIs in 2-3   weeks. You may call to confirm appointment as needed ([phone]).        Follow-up and recommended labs and tests       Follow up with primary care provider, Physician No Ref-Primary, within 7   days to evaluate medication change, for hospital follow- up, and regarding   new diagnosis.  The following labs/tests are recommended: blood pressure   moitoring.            Unresulted Labs Ordered in the Past 30 Days of this Admission       Date and Time Order Name Status Description    10/22/2023 10:40 AM Anaerobic Bacterial Culture Routine Preliminary         These results will be followed up by Mary Meraz    Discharge Disposition   Discharged to home  Condition at discharge: Stable    Hospital Course   Ana Bunn is a 62 year old female with PMHx significant for HTN, CKD3, DM2, GERD, mild TBI, and anemia who presented with 2 days of acute back pain. She was found to have Lumbar spine septic arthritis with discitis. She underwent aspiration. This was after presenting on oral antibiotics for recurrent UTI.    "   Lumbar spine septic arthritis, discitis  Acute non-traumatic back pain  -Denies injury/trauma recently or in the past, had a fall 1 year ago and broke arm, but not back injury  -on admission WBC WNL, CRP 62.5  -Lumbar MRI shows edema around L3/L4 and L4/L5 facet joints with extension dorsally and appear consistent with septic arthritis at L3-L4 and L4-L5 with extension into the dorsal epidural space with early dorsal epidural phlegmon in those areas.    -Neurosurgery consulted, no drainable abscess  -ID consulted: Continue vancomycin and ceftriaxone  -IR consult for aspiration today 10/22, Culture pending, anaerobic culture transported incorrectly and may not be accurate  -PICC line placed 10/23, discharged with home infusion     Insulin-dependent type 2 diabetes  -Hemoglobin A1c 10/21: 8.3%  -Is on 12 to 13 units of glargine at night OP     Essential hypertension  -Does not appear to be on medications outpatient  -started on amlodipine 5 mg and lisinopril 10 mg     VIKTOR (resolved) on CKD stage 3b  -Baseline fluctuates, ~1.4 - 1.7, up to 2.2 over past year  -On admission 1.63 with increased to 1.9 10/22, improving  -We will monitor and avoid nephrotoxic agents as possible     History of Recurrent UTI  -Recently diagnosed UTI, UA in the clinic this week showed pyuria and she was started on Keflex.  Unable to see urine culture.  Was started on keflex, had been taking up to admission  -Has had 6 episodes of dysuria in the past year with 5 of those episodes positive for E. coli on urine culture  -CT urogram 10/23: No evidence of bladder fistulous communication  -Urology consulted, appreciate recs     Tobacco abuse  -Counseled on smoking cessation by respiratory therapy    Consultations This Hospital Stay   PHARMACY TO DOSE VANCO  INTERVENTIONAL RADIOLOGY ADULT/PEDS IP CONSULT  INFECTIOUS DISEASES IP CONSULT  NEUROSURGERY IP CONSULT  PHARMACY TO DOSE VANCO  CARE MANAGEMENT / SOCIAL WORK IP CONSULT  UROLOGY IP  CONSULT  VASCULAR ACCESS ADULT IP CONSULT  SMOKING CESSATION PROGRAM IP CONSULT  PHYSICAL THERAPY ADULT IP CONSULT  OCCUPATIONAL THERAPY ADULT IP CONSULT    Code Status   Prior    Time Spent on this Encounter   I, Mary Meraz MD, personally saw the patient today and spent greater than 30 minutes discharging this patient.       Mary Meraz MD  [address] [address]  Phone: [phone]  Fax: [phone]  ______________________________________________________________________    Physical Exam   Vital Signs:                    Weight: 162 lbs 0 oz  General Appearance: Awake, alert, in no acute distress  Respiratory: CTAB, no wheeze  Cardiovascular: RRR, no murmur noted  GI: soft, nontender, non distended, normal bowel sounds  Skin: no jaundice, no rash         Primary Care Physician   Physician No Ref-Primary    Discharge Orders      Home Care Referral      Home Infusion Referral      Follow-up and recommended labs and tests     [facility] will call you to arrange follow up for recurrent UTIs in 2-3 weeks. You may call to confirm appointment as needed ([phone]).     Patient care order    ID discharge orders:    Draw weekly:  CBC with differential  BMP  ESR  CRP inflammation (NOT the hi sens version)  Vanco level    Fax above weekly to [facility] [phone]     Reason for your hospital stay    Septic arthritis of the lumbar spine     Follow-up and recommended labs and tests     Follow up with primary care provider, Physician No Ref-Primary, within 7 days to evaluate medication change, for hospital follow- up, and regarding new diagnosis.  The following labs/tests are recommended: blood pressure moitoring.     Activity    Your activity upon discharge: activity as tolerated     Diet    Follow this diet upon discharge: Moderate Consistent Carb (60 g CHO per Meal) Diet       Significant Results and Procedures   Most Recent 3 CBC's:  Recent Labs    Lab Test 10/30/23  1000 10/25/23  0546 10/24/23  0701   WBC 6.1 4.9 4.7   HGB 11.3* 11.4* 11.3*   MCV 94 92 94    212 198     Most Recent 3 BMP's:  Recent Labs   Lab Test 11/02/23  0935 10/30/23  1000 10/27/23  1223 10/27/23  0817 10/27/23  0527 10/26/23  0750 10/26/23  0605   NA  --  135  --   --  136  --  139   POTASSIUM  --  4.0  --   --  3.7  --  3.8   CHLORIDE  --  100  --   --  102  --  105   CO2  --  23  --   --  27  --  23   BUN  --  18.0  --   --  18.8  --  15.5   CR 1.72* 1.68*  --   --  1.70*  --  1.56*   ANIONGAP  --  12  --   --  7  --  11   HONG  --  9.2  --   --  9.3  --  9.1   GLC  --  128* 162* 113* 117*   < > 118*    < > = values in this interval not displayed.   ,   Results for orders placed or performed during the hospital encounter of 10/21/23   Lumbar spine CT w/o contrast    Narrative    EXAM: CT LUMBAR SPINE W/O CONTRAST  LOCATION: [facility]  DATE: 10/21/2023    INDICATION: Mid lumbar point tenderness  COMPARISON: None.  TECHNIQUE: Routine CT Lumbar Spine without IV contrast. Multiplanar reformats. Dose reduction techniques were used.     FINDINGS:  VERTEBRA: Transitional L5 vertebral body. Normal vertebral body heights and alignment. No fracture or posttraumatic subluxation.     CANAL/FORAMINA: Moderate left L4-L5 lateral recess and neural foraminal stenoses.    PARASPINAL: No extraspinal abnormality.      Impression    IMPRESSION:  1.  Transitional L5 vertebral body.  2.  No fracture or posttraumatic subluxation.  3.  Moderate left L4-L5 lateral recess and neural foraminal stenoses.   MR Lumbar Spine w/o & w Contrast    Addendum: 10/21/2023    Addendum/  impression:    Dr. Cao discussed the results with Dr. Houser on 10/21/2023 4:15 PM CDT by telephone.      Narrative    EXAM: MR LUMBAR SPINE W/O and W CONTRAST  LOCATION: [facility]  DATE: 10/21/2023    INDICATION: low point tenderness in lumbar spine. Not radicular. High  CRP. Concern for infectious process  COMPARISON: Lumbar spine CT 10/21/2023.  CONTRAST: 7ml Gadavist  TECHNIQUE: Routine Lumbar Spine MRI without and with IV contrast.    FINDINGS:   Transitional vertebral anatomy with with a partially sacralized L5 vertebral body.  Careful attention to the numbering convention is recommended prior to any future percutaneous or surgical intervention. Anterolisthesis of L4 and L5 measuring 7 mm.   Anterior marginal osteophytes at L1/L2 - L4/L5. The vertebral bodies of the lumbar spine otherwise have normal stature, alignment, and marrow signal intensity. Normal distal spinal cord and cauda equina with conus medullaris at the partially imaged   intra-abdominal contents are unremarkable. The conus terminates at the inferior plate of L1. The partially imaged intracranial contents are unremarkable. Edema within the left paraspinal musculature at L3/L4 and L4/L5, consistent with myositis.   Unremarkable visualized bony pelvis.    T12-L1: Normal disc signal and disc height. No posterior disc bulge or spinal canal narrowing. No neural foraminal narrowing.     L1-L2: Normal disc signal and disc height. No posterior disc bulge or spinal canal narrowing. Mild bilateral facet arthropathy. No neural foraminal narrowing.    L2-L3: Normal disc signal and disc height. No posterior disc bulge or spinal canal narrowing. No neural foraminal narrowing.     L3-L4: Edema, and enhancement is demonstrated centered on the L3/L4 and L4/L5 facet joints which extend into the dorsal aspect of the spinal canal with solid enhancement. Overall constellation of findings are consistent with septic arthritis at L3/L4 and   L4/L5 with extension into the dorsal epidural space with early dorsal epidural phlegmon at L3/L4 and L4/L5. Moderate spinal canal narrowing. Mild bilateral neural foraminal narrowing.    L4-L5: Mild loss of disc signal and disc height. Symmetric disc bulge and moderate facet arthropathy moderate  spinal spinal canal narrowing. Symmetric disc bulge without significant spinal canal narrowing. Moderate bilateral neural foraminal narrowing.    L5-S1: No posterior disc bulge or spinal canal narrowing. No neural foraminal narrowing.      Impression    IMPRESSION:  1.  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  Careful attention to the numbering convention is recommended prior to any future percutaneous or surgical intervention.   2.  Edema, and enhancement is demonstrated centered on the L3/L4 and L4/L5 facet joints which extend into the dorsal aspect of the spinal canal with solid enhancement. Overall constellation of findings are consistent with septic arthritis at L3/L4 and   L4/L5 with extension into the dorsal epidural space with early dorsal epidural phlegmon at L3/L4 and L4/L5. Moderate spinal canal narrowing. Moderate spinal canal narrowing at the L3/L4 and L4/L5 levels.  3.  Edema within the left paraspinal musculature at L3/L4 and L4/L5, consistent with myositis.    IR Disc Aspriation Injection    [facility]  INTERVENTIONAL NEURORADIOLOGY  10/22/2023 10:39 AM CDT    FLUOROSCOPICALLY GUIDED PERCUTANEOUS LACERATION OF THE LEFT L4-L5 FACET JOINT     INDICATION: Severe back pain. Imaging reveals septic arthritis of the left L4-L5 facet joint. Request for facet aspiration for further diagnosis and management.    CONSENT: The procedure and its indications, major risks, benefits, and alternatives were discussed. Risks including, but not limited to, pain, hemorrhage, infection, nerve damage, spinal cord damage, cardiac and pulmonary dysfunction related to sedation.   The possibility that the procedure may be nondiagnostic was discussed. Understanding was acknowledged, and a signed informed consent was obtained.    MODERATE SEDATION: The procedure was performed with the administration of intravenous conscious sedation with appropriate  preoperative, intraoperative, and postoperative evaluation. 15 minutes of supervised face to face conscious sedation time was   provided by a radiology nurse under my direct supervision. During the time-out, immediately prior to administration of medications, the patient was re-assessed for adequacy to receive conscious sedation.    MEDICATIONS:  Versed 2 mg IV  Fentanyl 50 mcg IV    FLUOROSCOPIC TIME: 1.1 minutes (1 images)    DOSE: Air Kerma: 64 mGy    DAP: 3093 mGy.cm2    ESTIMATED BLOOD LOSS: Minimal     PERFORMING PHYSICIAN(S):  Thomas Zepeda M.D.  [facility]     PROCEDURE: The patient was placed in prone position upon the fluoroscopic table. The skin of the back was prepped and draped in sterile fashion. Comparing with the MRI scan, the L4-L5 level was fluoroscopically localized. The skin over the left L4-L5   facet joint trajectory was infiltrated with 1% lidocaine. A 22 G needle was then advanced into the facet joint under fluoroscopic guidance. A total of 0.5 cc of thick bloody fluid was aspirated and sent for further analysis. The needle was then removed.   The specimen was placed in a capped labeled syringe and sent for microbiological analysis.    The procedure appeared well-tolerated. There was no apparent complication.      Impression    CONCLUSION:     Technically successful fluoroscopically guided percutaneous needle aspiration of the left L4-L5 facet joint. _____________________________________________       CT Cystogram wo & w Contrast    Narrative    EXAM: CT CYSTOGRAM WO and W CONTRAST  LOCATION: [facility]  DATE: 10/23/2023    INDICATION: Recurrent UTI, evaluate for fistula.  COMPARISON: None.  TECHNIQUE: CT pelvis without IV contrast using cystogram technique. Images without, and with filling of the bladder with 200 mL of dilute contrast. Post drain images were obtained. Multiplanar reformats were obtained. Dose reduction techniques were used.  CONTRAST: isovue  370 20ml    FINDINGS:   BLADDER: No extravasated urinary bladder contrast identified. No definite communication/fistula with adjacent bowel or the vagina. No communication/fistula is seen with the uterus.    ADDITIONAL FINDINGS: No pelvic masses or adenopathy. Nonspecific bowel gas pattern. Vascular calcifications common iliac arteries. Appendix is visualized and appears within normal limits. No inguinal masses or adenopathy.      Impression    IMPRESSION:  1.  No evidence for urinary bladder fistulous communication. Please see above report.       Discharge Medications   Discharge Medication List as of 10/27/2023  1:52 PM        START taking these medications    Details   amLODIPine (NORVASC) 5 MG tablet Take 1 tablet (5 mg) by mouth daily for 30 days, Disp-30 tablet, R-0, E-Prescribe      lisinopril (ZESTRIL) 10 MG tablet Take 1 tablet (10 mg) by mouth daily for 30 days, Disp-30 tablet, R-0, E-Prescribe      nicotine (NICORETTE) 2 MG gum Place 1 each (2 mg) inside cheek every 2 hours as needed for smoking cessation, Disp-120 each, R-0, E-Prescribe           CONTINUE these medications which have CHANGED    Details   cefTRIAXone (ROCEPHIN) 2 GM vial Inject 2 g into the vein every 24 hours for 48 days, Disp-960 mL, R-0, Local Print      traMADol (ULTRAM) 50 MG tablet Take 1 tablet (50 mg) by mouth every 6 hours as needed for moderate to severe pain, Disp-28 tablet, R-0, E-Prescribe      vancomycin (VANCOCIN) 1000 mg in dextrose 5% 200 mL PREMIX Inject 1,000 mg into the vein every 24 hours for 49 days, Disp-9800 mL, R-0, Local Print           CONTINUE these medications which have NOT CHANGED    Details   cinnamon 500 MG CAPS Take 1 capsule by mouth daily, Historical      garlic 150 MG TABS tablet Take 150 mg by mouth daily, Historical      insulin glargine (BASAGLAR KWIKPEN) 100 UNIT/ML pen Inject 12-13 Units Subcutaneous every evening, Historical      multivitamin, therapeutic (THERA-VIT) TABS tablet Take 1 tablet by  mouth daily, Historical      thiamine (B-1) 100 MG tablet Take 100 mg by mouth daily, Historical      Vitamin D3 (VITAMIN D, CHOLECALCIFEROL,) 25 mcg (1000 units) tablet Take 1 tablet by mouth daily, Historical           STOP taking these medications       cephALEXin (KEFLEX) 500 MG capsule Comments:   Reason for Stopping:             Allergies   No Known Allergies

## 2023-11-04 LAB
GAMMA INTERFERON BACKGROUND BLD IA-ACNC: 0 IU/ML
M TB IFN-G BLD-IMP: NEGATIVE
M TB IFN-G CD4+ BCKGRND COR BLD-ACNC: 10 IU/ML
MITOGEN IGNF BCKGRD COR BLD-ACNC: 0 IU/ML
MITOGEN IGNF BCKGRD COR BLD-ACNC: 0 IU/ML
QUANTIFERON MITOGEN: 10 IU/ML
QUANTIFERON NIL TUBE: 0 IU/ML
QUANTIFERON TB1 TUBE: 0 IU/ML
QUANTIFERON TB2 TUBE: 0

## 2023-11-05 LAB — BACTERIA ASPIRATE CULT: NORMAL

## 2023-11-06 ENCOUNTER — LAB REQUISITION (OUTPATIENT)
Dept: LAB | Facility: CLINIC | Age: 63
End: 2023-11-06
Payer: COMMERCIAL

## 2023-11-06 DIAGNOSIS — M46.56: ICD-10-CM

## 2023-11-06 LAB
ANION GAP SERPL CALCULATED.3IONS-SCNC: 8 MMOL/L (ref 7–15)
BASOPHILS # BLD AUTO: 0.1 10E3/UL (ref 0–0.2)
BASOPHILS NFR BLD AUTO: 1 %
BUN SERPL-MCNC: 16 MG/DL (ref 8–23)
CALCIUM SERPL-MCNC: 9.8 MG/DL (ref 8.8–10.2)
CHLORIDE SERPL-SCNC: 101 MMOL/L (ref 98–107)
CREAT SERPL-MCNC: 1.7 MG/DL (ref 0.51–0.95)
CRP SERPL-MCNC: 8.93 MG/L
DEPRECATED HCO3 PLAS-SCNC: 27 MMOL/L (ref 22–29)
EGFRCR SERPLBLD CKD-EPI 2021: 34 ML/MIN/1.73M2
EOSINOPHIL # BLD AUTO: 0.3 10E3/UL (ref 0–0.7)
EOSINOPHIL NFR BLD AUTO: 5 %
ERYTHROCYTE [DISTWIDTH] IN BLOOD BY AUTOMATED COUNT: 12.1 % (ref 10–15)
ERYTHROCYTE [SEDIMENTATION RATE] IN BLOOD BY WESTERGREN METHOD: 57 MM/HR (ref 0–30)
GLUCOSE SERPL-MCNC: 192 MG/DL (ref 70–99)
HCT VFR BLD AUTO: 38.7 % (ref 35–47)
HGB BLD-MCNC: 13.1 G/DL (ref 11.7–15.7)
IMM GRANULOCYTES # BLD: 0 10E3/UL
IMM GRANULOCYTES NFR BLD: 0 %
LYMPHOCYTES # BLD AUTO: 2.1 10E3/UL (ref 0.8–5.3)
LYMPHOCYTES NFR BLD AUTO: 31 %
MCH RBC QN AUTO: 31 PG (ref 26.5–33)
MCHC RBC AUTO-ENTMCNC: 33.9 G/DL (ref 31.5–36.5)
MCV RBC AUTO: 92 FL (ref 78–100)
MONOCYTES # BLD AUTO: 0.4 10E3/UL (ref 0–1.3)
MONOCYTES NFR BLD AUTO: 6 %
NEUTROPHILS # BLD AUTO: 3.8 10E3/UL (ref 1.6–8.3)
NEUTROPHILS NFR BLD AUTO: 57 %
NRBC # BLD AUTO: 0 10E3/UL
NRBC BLD AUTO-RTO: 0 /100
PLATELET # BLD AUTO: 360 10E3/UL (ref 150–450)
POTASSIUM SERPL-SCNC: 4 MMOL/L (ref 3.4–5.3)
RBC # BLD AUTO: 4.23 10E6/UL (ref 3.8–5.2)
SODIUM SERPL-SCNC: 136 MMOL/L (ref 135–145)
VANCOMYCIN SERPL-MCNC: 16.8 UG/ML
WBC # BLD AUTO: 6.7 10E3/UL (ref 4–11)

## 2023-11-06 PROCEDURE — 85652 RBC SED RATE AUTOMATED: CPT | Performed by: INTERNAL MEDICINE

## 2023-11-06 PROCEDURE — 85025 COMPLETE CBC W/AUTO DIFF WBC: CPT | Performed by: INTERNAL MEDICINE

## 2023-11-06 PROCEDURE — 80048 BASIC METABOLIC PNL TOTAL CA: CPT | Performed by: INTERNAL MEDICINE

## 2023-11-06 PROCEDURE — 86140 C-REACTIVE PROTEIN: CPT | Performed by: INTERNAL MEDICINE

## 2023-11-06 PROCEDURE — 80202 ASSAY OF VANCOMYCIN: CPT | Performed by: INTERNAL MEDICINE

## 2023-11-09 ENCOUNTER — LAB REQUISITION (OUTPATIENT)
Dept: LAB | Facility: CLINIC | Age: 63
End: 2023-11-09
Payer: COMMERCIAL

## 2023-11-09 DIAGNOSIS — M46.56: ICD-10-CM

## 2023-11-09 LAB
CREAT SERPL-MCNC: 1.71 MG/DL (ref 0.51–0.95)
EGFRCR SERPLBLD CKD-EPI 2021: 33 ML/MIN/1.73M2
VANCOMYCIN SERPL-MCNC: 16.3 UG/ML

## 2023-11-09 PROCEDURE — 82565 ASSAY OF CREATININE: CPT | Performed by: INTERNAL MEDICINE

## 2023-11-09 PROCEDURE — 80202 ASSAY OF VANCOMYCIN: CPT | Performed by: INTERNAL MEDICINE

## 2023-11-13 ENCOUNTER — LAB REQUISITION (OUTPATIENT)
Dept: LAB | Facility: HOSPITAL | Age: 63
End: 2023-11-13
Payer: COMMERCIAL

## 2023-11-13 DIAGNOSIS — M46.56: ICD-10-CM

## 2023-11-13 LAB
ANION GAP SERPL CALCULATED.3IONS-SCNC: 13 MMOL/L (ref 7–15)
BASOPHILS # BLD AUTO: 0 10E3/UL (ref 0–0.2)
BASOPHILS NFR BLD AUTO: 1 %
BUN SERPL-MCNC: 21.6 MG/DL (ref 8–23)
CALCIUM SERPL-MCNC: 8.9 MG/DL (ref 8.8–10.2)
CHLORIDE SERPL-SCNC: 100 MMOL/L (ref 98–107)
CREAT SERPL-MCNC: 1.82 MG/DL (ref 0.51–0.95)
CRP SERPL-MCNC: 3.5 MG/L
DEPRECATED HCO3 PLAS-SCNC: 24 MMOL/L (ref 22–29)
EGFRCR SERPLBLD CKD-EPI 2021: 31 ML/MIN/1.73M2
EOSINOPHIL # BLD AUTO: 0.3 10E3/UL (ref 0–0.7)
EOSINOPHIL NFR BLD AUTO: 5 %
ERYTHROCYTE [DISTWIDTH] IN BLOOD BY AUTOMATED COUNT: 12.3 % (ref 10–15)
ERYTHROCYTE [SEDIMENTATION RATE] IN BLOOD BY WESTERGREN METHOD: 37 MM/HR (ref 0–30)
GLUCOSE SERPL-MCNC: 337 MG/DL (ref 70–99)
HCT VFR BLD AUTO: 35.3 % (ref 35–47)
HGB BLD-MCNC: 11.9 G/DL (ref 11.7–15.7)
IMM GRANULOCYTES # BLD: 0 10E3/UL
IMM GRANULOCYTES NFR BLD: 1 %
LYMPHOCYTES # BLD AUTO: 1.6 10E3/UL (ref 0.8–5.3)
LYMPHOCYTES NFR BLD AUTO: 27 %
MCH RBC QN AUTO: 30.6 PG (ref 26.5–33)
MCHC RBC AUTO-ENTMCNC: 33.7 G/DL (ref 31.5–36.5)
MCV RBC AUTO: 91 FL (ref 78–100)
MONOCYTES # BLD AUTO: 0.4 10E3/UL (ref 0–1.3)
MONOCYTES NFR BLD AUTO: 6 %
NEUTROPHILS # BLD AUTO: 3.5 10E3/UL (ref 1.6–8.3)
NEUTROPHILS NFR BLD AUTO: 60 %
NRBC # BLD AUTO: 0 10E3/UL
NRBC BLD AUTO-RTO: 0 /100
PLATELET # BLD AUTO: 285 10E3/UL (ref 150–450)
POTASSIUM SERPL-SCNC: 4.2 MMOL/L (ref 3.4–5.3)
RBC # BLD AUTO: 3.89 10E6/UL (ref 3.8–5.2)
SODIUM SERPL-SCNC: 137 MMOL/L (ref 135–145)
VANCOMYCIN SERPL-MCNC: 15.7 UG/ML
WBC # BLD AUTO: 5.8 10E3/UL (ref 4–11)

## 2023-11-13 PROCEDURE — 80048 BASIC METABOLIC PNL TOTAL CA: CPT | Performed by: INTERNAL MEDICINE

## 2023-11-13 PROCEDURE — 85014 HEMATOCRIT: CPT | Performed by: INTERNAL MEDICINE

## 2023-11-13 PROCEDURE — 80202 ASSAY OF VANCOMYCIN: CPT | Performed by: INTERNAL MEDICINE

## 2023-11-13 PROCEDURE — 85652 RBC SED RATE AUTOMATED: CPT | Performed by: INTERNAL MEDICINE

## 2023-11-13 PROCEDURE — 86140 C-REACTIVE PROTEIN: CPT | Performed by: INTERNAL MEDICINE

## 2023-11-14 ENCOUNTER — TELEPHONE (OUTPATIENT)
Dept: INFECTIOUS DISEASES | Facility: CLINIC | Age: 63
End: 2023-11-14
Payer: COMMERCIAL

## 2023-11-14 DIAGNOSIS — M46.56 SEPTIC ARTHRITIS OF LUMBAR SPINE (H): Primary | ICD-10-CM

## 2023-11-14 NOTE — TELEPHONE ENCOUNTER
[facility] Call Center    Phone Message    May a detailed message be left on voicemail: yes     Reason for Call: Other: Pt would like to know if she needed an MRI for her lower back before following up with Dr. Luther.If so, please put in orders and follow up with pt.       Action Taken: Other: ID    Travel Screening: Not Applicable

## 2023-11-20 ENCOUNTER — HOSPITAL ENCOUNTER (EMERGENCY)
Facility: HOSPITAL | Age: 63
Discharge: LEFT BEFORE DISCHARGE | End: 2023-11-20
Admitting: EMERGENCY MEDICINE
Payer: COMMERCIAL

## 2023-11-20 ENCOUNTER — APPOINTMENT (OUTPATIENT)
Dept: MRI IMAGING | Facility: HOSPITAL | Age: 63
End: 2023-11-20
Attending: EMERGENCY MEDICINE
Payer: COMMERCIAL

## 2023-11-20 ENCOUNTER — LAB REQUISITION (OUTPATIENT)
Dept: LAB | Facility: CLINIC | Age: 63
End: 2023-11-20
Payer: COMMERCIAL

## 2023-11-20 VITALS
DIASTOLIC BLOOD PRESSURE: 81 MMHG | TEMPERATURE: 97.6 F | BODY MASS INDEX: 29.44 KG/M2 | WEIGHT: 160 LBS | RESPIRATION RATE: 12 BRPM | HEIGHT: 62 IN | OXYGEN SATURATION: 100 % | HEART RATE: 85 BPM | SYSTOLIC BLOOD PRESSURE: 171 MMHG

## 2023-11-20 DIAGNOSIS — M46.56: ICD-10-CM

## 2023-11-20 DIAGNOSIS — M54.9 BACK PAIN: ICD-10-CM

## 2023-11-20 DIAGNOSIS — M46.59: ICD-10-CM

## 2023-11-20 LAB
ANION GAP SERPL CALCULATED.3IONS-SCNC: 12 MMOL/L (ref 7–15)
BASOPHILS # BLD AUTO: 0.1 10E3/UL (ref 0–0.2)
BASOPHILS NFR BLD AUTO: 1 %
BUN SERPL-MCNC: 22.7 MG/DL (ref 8–23)
CALCIUM SERPL-MCNC: 9.5 MG/DL (ref 8.8–10.2)
CHLORIDE SERPL-SCNC: 101 MMOL/L (ref 98–107)
CREAT SERPL-MCNC: 1.85 MG/DL (ref 0.51–0.95)
CRP SERPL-MCNC: 4.72 MG/L
DEPRECATED HCO3 PLAS-SCNC: 24 MMOL/L (ref 22–29)
EGFRCR SERPLBLD CKD-EPI 2021: 30 ML/MIN/1.73M2
EOSINOPHIL # BLD AUTO: 0.3 10E3/UL (ref 0–0.7)
EOSINOPHIL NFR BLD AUTO: 6 %
ERYTHROCYTE [DISTWIDTH] IN BLOOD BY AUTOMATED COUNT: 12.3 % (ref 10–15)
ERYTHROCYTE [SEDIMENTATION RATE] IN BLOOD BY WESTERGREN METHOD: 33 MM/HR (ref 0–30)
GLUCOSE SERPL-MCNC: 173 MG/DL (ref 70–99)
HCT VFR BLD AUTO: 35.4 % (ref 35–47)
HGB BLD-MCNC: 12 G/DL (ref 11.7–15.7)
IMM GRANULOCYTES # BLD: 0 10E3/UL
IMM GRANULOCYTES NFR BLD: 1 %
LYMPHOCYTES # BLD AUTO: 1.7 10E3/UL (ref 0.8–5.3)
LYMPHOCYTES NFR BLD AUTO: 30 %
MCH RBC QN AUTO: 30.5 PG (ref 26.5–33)
MCHC RBC AUTO-ENTMCNC: 33.9 G/DL (ref 31.5–36.5)
MCV RBC AUTO: 90 FL (ref 78–100)
MONOCYTES # BLD AUTO: 0.4 10E3/UL (ref 0–1.3)
MONOCYTES NFR BLD AUTO: 7 %
NEUTROPHILS # BLD AUTO: 3.2 10E3/UL (ref 1.6–8.3)
NEUTROPHILS NFR BLD AUTO: 55 %
NRBC # BLD AUTO: 0 10E3/UL
NRBC BLD AUTO-RTO: 0 /100
PLATELET # BLD AUTO: 246 10E3/UL (ref 150–450)
POTASSIUM SERPL-SCNC: 4.4 MMOL/L (ref 3.4–5.3)
RBC # BLD AUTO: 3.93 10E6/UL (ref 3.8–5.2)
SODIUM SERPL-SCNC: 137 MMOL/L (ref 135–145)
VANCOMYCIN SERPL-MCNC: 18.8 UG/ML
WBC # BLD AUTO: 5.7 10E3/UL (ref 4–11)

## 2023-11-20 PROCEDURE — A9585 GADOBUTROL INJECTION: HCPCS | Mod: JZ

## 2023-11-20 PROCEDURE — 250N000011 HC RX IP 250 OP 636: Performed by: EMERGENCY MEDICINE

## 2023-11-20 PROCEDURE — 99285 EMERGENCY DEPT VISIT HI MDM: CPT | Mod: 25

## 2023-11-20 PROCEDURE — 85025 COMPLETE CBC W/AUTO DIFF WBC: CPT | Performed by: INTERNAL MEDICINE

## 2023-11-20 PROCEDURE — 80202 ASSAY OF VANCOMYCIN: CPT | Performed by: INTERNAL MEDICINE

## 2023-11-20 PROCEDURE — 80048 BASIC METABOLIC PNL TOTAL CA: CPT | Performed by: INTERNAL MEDICINE

## 2023-11-20 PROCEDURE — 86140 C-REACTIVE PROTEIN: CPT | Performed by: INTERNAL MEDICINE

## 2023-11-20 PROCEDURE — 255N000002 HC RX 255 OP 636: Mod: JZ

## 2023-11-20 PROCEDURE — 85652 RBC SED RATE AUTOMATED: CPT | Performed by: INTERNAL MEDICINE

## 2023-11-20 PROCEDURE — 96374 THER/PROPH/DIAG INJ IV PUSH: CPT | Mod: 59

## 2023-11-20 PROCEDURE — 72158 MRI LUMBAR SPINE W/O & W/DYE: CPT

## 2023-11-20 RX ORDER — PREDNISONE 20 MG/1
TABLET ORAL
Qty: 10 TABLET | Refills: 0 | Status: SHIPPED | OUTPATIENT
Start: 2023-11-20 | End: 2023-11-20

## 2023-11-20 RX ORDER — ONDANSETRON 2 MG/ML
4 INJECTION INTRAMUSCULAR; INTRAVENOUS ONCE
Status: COMPLETED | OUTPATIENT
Start: 2023-11-20 | End: 2023-11-20

## 2023-11-20 RX ORDER — LIDOCAINE 50 MG/G
1 PATCH TOPICAL EVERY 24 HOURS
Qty: 10 PATCH | Refills: 0 | Status: SHIPPED | OUTPATIENT
Start: 2023-11-20

## 2023-11-20 RX ORDER — MORPHINE SULFATE 4 MG/ML
4 INJECTION, SOLUTION INTRAMUSCULAR; INTRAVENOUS ONCE
Status: COMPLETED | OUTPATIENT
Start: 2023-11-20 | End: 2023-11-20

## 2023-11-20 RX ORDER — OXYCODONE HYDROCHLORIDE 5 MG/1
5 TABLET ORAL EVERY 6 HOURS PRN
Qty: 12 TABLET | Refills: 0 | Status: SHIPPED | OUTPATIENT
Start: 2023-11-20 | End: 2023-11-23

## 2023-11-20 RX ORDER — GADOBUTROL 604.72 MG/ML
7 INJECTION INTRAVENOUS ONCE
Status: COMPLETED | OUTPATIENT
Start: 2023-11-20 | End: 2023-11-20

## 2023-11-20 RX ORDER — METHOCARBAMOL 500 MG/1
500 TABLET, FILM COATED ORAL 4 TIMES DAILY PRN
Qty: 30 TABLET | Refills: 0 | Status: SHIPPED | OUTPATIENT
Start: 2023-11-20

## 2023-11-20 RX ADMIN — MORPHINE SULFATE 4 MG: 4 INJECTION, SOLUTION INTRAMUSCULAR; INTRAVENOUS at 17:17

## 2023-11-20 RX ADMIN — GADOBUTROL 7 ML: 604.72 INJECTION INTRAVENOUS at 16:30

## 2023-11-20 ASSESSMENT — ACTIVITIES OF DAILY LIVING (ADL): ADLS_ACUITY_SCORE: 38

## 2023-11-20 NOTE — ED TRIAGE NOTES
Pt has been treated for a spinal infection for the last 3 weeks.  Has a Right PICC line for home antibiotics.  Pt is concerned as her back pain and right groin pain has not been getting better. She still has pain  at  6/10.

## 2023-11-20 NOTE — ED PROVIDER NOTES
EMERGENCY DEPARTMENT ENCOUNTER      NAME: Ana Bunn  AGE: 62 year old female  YOB: 1960  MRN: [id number]  EVALUATION DATE & TIME: 11/20/2023  3:49 PM    PCP: No Ref-Primary, Physician    ED PROVIDER: Madelaine Batisat PA-C      Chief Complaint   Patient presents with    Back Pain         FINAL IMPRESSION:  1. Back pain    2. 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 (See chart for details)  62 year old female presents to the Emergency Department for evaluation of back and leg pain.  The patient was recently admitted from 10/21/2023 to 10/27/2023 for septic arthritis and discitis in the lumbar spine.  She was discharged home with a PICC line with IV vancomycin and ceftriaxone.  Since then she has had multiple follow-ups with both infectious diseases and family medicine and she had been doing well however, over the last few days she has had worsening pain in her back and now radiation of pain into her right side.  She has weekly lab draws and nurse visits on Mondays and today she mentioned her symptoms to her nurse and they were concerned so they recommended she come to the emergency department.  On my evaluation, the patient was initially hypertensive at 155/78 but otherwise vitally stable.  Examination with heart regular rate and rhythm and lungs clear to auscultation bilaterally.  Abdomen soft, nontender without any rebound or guarding.  Tenderness to the midline lumbar spine and right paraspinal muscles.  5 out of 5 strength and sensation in bilateral upper and lower extremities.  Normal gait.  Differential diagnosis included worsening infection, cauda equina, fracture, subluxation.    Patient had laboratory evaluation performed this morning and I am able to view these.  Creatinine is stable at 1.85 compared to 1 week ago at 1.82 and no significant change in GFR.  CRP is normal at 4.72.  CBC  without any significant derangement including a normal white blood cell count.  ESR is at 33 which is improved from 37 7 days ago.  Patient is afebrile here and based on symptoms I did feel MRI of the lumbar spine with and without contrast was indicated.  Fortunately MRI of the lumbar spine shows improvement of edema and enhancement of the bilateral L3-L5 facet joints.  Additionally, there is decreased dorsal epidural enhancement from L3-L5 without any epidural or loculated paraspinal fluid collection.  I discussed findings with the patient she was reassured.  She was given a dose of morphine here for pain with significant improvement.  Discussed discharge home with oxycodone, Flexeril, lidocaine patches.  Patient was agreement understanding.  I discussed that I would like to discuss her case with the rectus disease team to make sure there is nothing further they would like me to do here in the department.  Patient was agreeable to this.  Does have follow-up with them on 11/30/2023 and I told her to keep this appointment.  Unfortunately, I unicorn today there was on hold with the infectious disease team for over 40 minutes.  During this time, patient left the department without letting any of the staff know.  Then MRI and laboratory evaluation is reassuring and she has close follow-up and pain was improved.  She did not get any of her medications prior to arrival as these were written at this paper scripts.  These were decided upon leaving the department.  Prior to her leaving AM, I did discuss return precautions and follow-up as well.    Medical Decision Making    History:  Supplemental history from: Documented in chart, if applicable  External Record(s) reviewed: Inpatient Record: 10/21/2023 for septic arthritis.  Patient was having back pain and found to have findings of septic arthritis in her lumbar spine.  She was ultimately discharged home with IV antibiotics and a PICC line    Work Up:  Chart documentation  includes differential considered and any EKGs or imaging independently interpreted by provider, where specified.  In additional to work up documented, I considered the following work up: Documented in chart, if applicable.    External consultation:  Discussion of management with another provider: Documented in chart, if applicable    Complicating factors:  Care impacted by chronic illness: Chronic Kidney Disease, Diabetes, and Hypertension  Care affected by social determinants of health: Access to Medical Care    Disposition considerations: Discharge. I prescribed additional prescription strength medication(s) as charted. See documentation for any additional details.         ED COURSE:  1:30 PM I met with the patient, obtained history, performed an initial exam, and discussed options and plan for diagnostics and treatment here in the ED.    4:00 PM I went to check on the patient and they were in MRI.    5:40 PM I discussed findings and follow-up with the patient she was in agreement understanding.  I did discuss with her that I will be speaking with infectious disease prior to discharge and daughter and patient voiced agreement and understanding.    6:30 PM nursing came to find me and stated that the patient was no longer in the room.  We attempted to call the patient she did not answer.  Patient left the department    At the conclusion of the encounter I discussed the results of all of the tests and the disposition. The questions were answered. The patient or family acknowledged understanding and was agreeable with the care plan.     MEDICATIONS GIVEN IN THE EMERGENCY:  Medications   gadobutrol (GADAVIST) injection 7 mL (7 mLs Intravenous $Given 11/20/23 1630)   morphine (PF) injection 4 mg (4 mg Intravenous $Given 11/20/23 1717)   ondansetron (ZOFRAN) injection 4 mg (4 mg Intravenous Not Given 11/20/23 1800)       NEW PRESCRIPTIONS STARTED AT TODAY'S ER VISIT  Discharge Medication List as of 11/20/2023  6:51 PM         START taking these medications    Details   lidocaine (LIDODERM) 5 % patch Place 1 patch onto the skin every 24 hours To prevent lidocaine toxicity, patient should be patch free for 12 hrs daily.Disp-10 patch, R-0Local Print      methocarbamol (ROBAXIN) 500 MG tablet Take 1 tablet (500 mg) by mouth 4 times daily as needed for muscle spasms, Disp-30 tablet, R-0, Local Print      oxyCODONE (ROXICODONE) 5 MG tablet Take 1 tablet (5 mg) by mouth every 6 hours as needed, Disp-12 tablet, R-0, Local Print                  =================================================================    HPI:    Patient information was obtained from: The patient    Use of Interpretor: N/A         Ana Bunn is a 62 year old female with a pertinent history of CKD, type 2 diabetes, dyslipidemia, hypertension, who presents to this ED via private vehicle for evaluation of leg pain.  The patient was recently admitted from 10/21/2023 to 10/27/2023 for septic arthritis and discitis in the lumbar spine.  She was discharged home with IV vancomycin and ceftriaxone.  She has had multiple follow-up since then including with infectious disease on 11/2/2023 with improvement of symptoms.  Since then she has had weekly visits by a nurse on Mondays for lab draws including today.  Over the last few days she has had worsening low back pain and pain radiating to her right thigh and she was concerned and nurse recommended she come to the emergency department.  On my evaluation, the patient denies any fevers or chills.  She states that she takes Tylenol for her back pain but denies any other medications.  Denies any recent falls or injury to the area.  She denies any loss of control of bowel or bladder, numbness or tingling in the groin, lower extremity weakness.  She denies any abdominal pain, nausea, vomiting, chest pain, difficulty breathing.  She does not voice any other concerns at this time.      REVIEW OF SYSTEMS:  Negative unless otherwise  stated in the above HPI.      PAST MEDICAL HISTORY:  No past medical history on file.    PAST SURGICAL HISTORY:  Past Surgical History:   Procedure Laterality Date    IR DISC ASPIRATION INJECTION  10/22/2023           CURRENT MEDICATIONS:    No current facility-administered medications for this encounter.    Current Outpatient Medications:     lidocaine (LIDODERM) 5 % patch, Place 1 patch onto the skin every 24 hours To prevent lidocaine toxicity, patient should be patch free for 12 hrs daily., Disp: 10 patch, Rfl: 0    methocarbamol (ROBAXIN) 500 MG tablet, Take 1 tablet (500 mg) by mouth 4 times daily as needed for muscle spasms, Disp: 30 tablet, Rfl: 0    oxyCODONE (ROXICODONE) 5 MG tablet, Take 1 tablet (5 mg) by mouth every 6 hours as needed, Disp: 12 tablet, Rfl: 0    amLODIPine (NORVASC) 5 MG tablet, Take 1 tablet (5 mg) by mouth daily for 30 days, Disp: 30 tablet, Rfl: 0    cefTRIAXone (ROCEPHIN) 2 GM vial, Inject 2 g into the vein every 24 hours for 48 days, Disp: 960 mL, Rfl: 0    cinnamon 500 MG CAPS, Take 1 capsule by mouth daily, Disp: , Rfl:     garlic 150 MG TABS tablet, Take 150 mg by mouth daily, Disp: , Rfl:     insulin glargine (BASAGLAR KWIKPEN) 100 UNIT/ML pen, Inject 12-13 Units Subcutaneous every evening, Disp: , Rfl:     lisinopril (ZESTRIL) 10 MG tablet, Take 1 tablet (10 mg) by mouth daily for 30 days, Disp: 30 tablet, Rfl: 0    multivitamin, therapeutic (THERA-VIT) TABS tablet, Take 1 tablet by mouth daily, Disp: , Rfl:     thiamine (B-1) 100 MG tablet, Take 100 mg by mouth daily, Disp: , Rfl:     vancomycin (VANCOCIN) 1000 mg in dextrose 5% 200 mL PREMIX, Inject 1,000 mg into the vein every 24 hours for 49 days, Disp: 9800 mL, Rfl: 0    Vitamin D3 (VITAMIN D, CHOLECALCIFEROL,) 25 mcg (1000 units) tablet, Take 1 tablet by mouth daily, Disp: , Rfl:       ALLERGIES:  No Known Allergies    FAMILY HISTORY:  No family history on file.    SOCIAL HISTORY:   Social History     Socioeconomic  "History    Marital status:    Tobacco Use    Smoking status: Every Day     Packs/day: .5     Types: Cigarettes    Smokeless tobacco: Never    Tobacco comments:     Seen IP by [facility] on 10/25, contemplating a quit attempt   Substance and Sexual Activity    Alcohol use: Yes     Alcohol/week: 3.0 standard drinks of alcohol     Types: 3 Cans of beer per week     Comment: approx 3 beers/day       VITALS:  Patient Vitals for the past 24 hrs:   BP Temp Temp src Pulse Resp SpO2 Height Weight   11/20/23 1811 (!) 171/81 -- -- 85 -- 100 % -- --   11/20/23 1756 (!) 181/86 -- -- 85 -- 100 % -- --   11/20/23 1750 -- -- -- 88 -- 100 % -- --   11/20/23 1741 (!) 176/81 -- -- 85 -- 100 % -- --   11/20/23 1730 (!) 163/86 -- -- -- -- -- -- --   11/20/23 1721 (!) 178/87 -- -- -- -- -- -- --   11/20/23 1217 (!) 155/78 97.6  F (36.4  C) Tympanic 94 12 100 % 1.575 m (5' 2\") 72.6 kg (160 lb)       PHYSICAL EXAM    Constitutional: Well developed, Well nourished, NAD  HENT: Normocephalic, Atraumatic, Bilateral external ears normal, Oropharynx normal, mucous membranes moist, Nose normal.   Neck: Normal range of motion, No tenderness, Supple, No stridor.  Eyes: Eyes track normally throughout exam, conjunctiva normal, No discharge.   Respiratory: Normal breath sounds, No respiratory distress, No wheezing, Speaks full sentences easily. No cough.  Cardiovascular: Normal heart rate, Regular rhythm, No murmurs, No rubs, No gallops. Chest wall nontender.  GI: Soft, No tenderness, No masses, No flank tenderness. No rebound or guarding.  Musculoskeletal: 2+ DP pulses. No edema. No cyanosis, No clubbing. Good range of motion in all major joints. No tenderness to palpation or major deformities noted.  Tenderness to palpation of the lumbar spine with right-sided paraspinal muscle tenderness to palpation.  Integument: Warm, Dry, No erythema, No rash. No petechiae.  Neurologic: 5 out of 5 strength and sensation bilateral upper and lower extremities.  " Alert & oriented x 3, Normal motor function, Normal sensory function, No focal deficits noted. Normal gait.  Psychiatric: Affect normal, Judgment normal, Mood normal. Cooperative.    LAB:  All pertinent labs reviewed and interpreted.  Recent Results (from the past 24 hour(s))   Basic metabolic panel    Collection Time: 11/20/23  8:45 AM   Result Value Ref Range    Sodium 137 135 - 145 mmol/L    Potassium 4.4 3.4 - 5.3 mmol/L    Chloride 101 98 - 107 mmol/L    Carbon Dioxide (CO2) 24 22 - 29 mmol/L    Anion Gap 12 7 - 15 mmol/L    Urea Nitrogen 22.7 8.0 - 23.0 mg/dL    Creatinine 1.85 (H) 0.51 - 0.95 mg/dL    GFR Estimate 30 (L) >60 mL/min/1.73m2    Calcium 9.5 8.8 - 10.2 mg/dL    Glucose 173 (H) 70 - 99 mg/dL   CRP inflammation    Collection Time: 11/20/23  8:45 AM   Result Value Ref Range    CRP Inflammation 4.72 <5.00 mg/L   Erythrocyte sedimentation rate auto    Collection Time: 11/20/23  8:45 AM   Result Value Ref Range    Erythrocyte Sedimentation Rate 33 (H) 0 - 30 mm/hr   Vancomycin level    Collection Time: 11/20/23  8:45 AM   Result Value Ref Range    Vancomycin 18.8   ug/mL   CBC with platelets and differential    Collection Time: 11/20/23  8:45 AM   Result Value Ref Range    WBC Count 5.7 4.0 - 11.0 10e3/uL    RBC Count 3.93 3.80 - 5.20 10e6/uL    Hemoglobin 12.0 11.7 - 15.7 g/dL    Hematocrit 35.4 35.0 - 47.0 %    MCV 90 78 - 100 fL    MCH 30.5 26.5 - 33.0 pg    MCHC 33.9 31.5 - 36.5 g/dL    RDW 12.3 10.0 - 15.0 %    Platelet Count 246 150 - 450 10e3/uL    % Neutrophils 55 %    % Lymphocytes 30 %    % Monocytes 7 %    % Eosinophils 6 %    % Basophils 1 %    % Immature Granulocytes 1 %    NRBCs per 100 WBC 0 <1 /100    Absolute Neutrophils 3.2 1.6 - 8.3 10e3/uL    Absolute Lymphocytes 1.7 0.8 - 5.3 10e3/uL    Absolute Monocytes 0.4 0.0 - 1.3 10e3/uL    Absolute Eosinophils 0.3 0.0 - 0.7 10e3/uL    Absolute Basophils 0.1 0.0 - 0.2 10e3/uL    Absolute Immature Granulocytes 0.0 <=0.4 10e3/uL    Absolute  NRBCs 0.0 10e3/uL         RADIOLOGY:  Reviewed all pertinent imaging. Please see official radiology report.  MR Lumbar Spine w/o & w Contrast   Final Result   IMPRESSION:   1.  Transitional lumbosacral anatomy.   2.  Since 10/21/2023, mildly decreased edema and enhancement centered at the bilateral L3-L5 facet joints, greater on the left where there is resolution of previously seen increased intra-articular fluid signal intensity.   3.  Decreased dorsal epidural enhancement at L3-L5. No epidural or loculated paraspinal fluid collection.   4.  A questionable right renal lesion, as described. A nonemergent renal ultrasound is recommended for better characterization.          PROCEDURES:   None.     Madelaine Batista PA-C  Emergency Medicine  [facility]  11/20/2023      Madelaine Batista PA-C  11/20/23 2120

## 2023-11-21 NOTE — ED NOTES
RN rounded on patient at 1830, patient not in the room. RN called patient's contact number and no answer.

## 2023-11-21 NOTE — DISCHARGE INSTRUCTIONS
You are seen and evaluated here in the emergency department for your back pain.  Fortunately, MRI and laboratory evaluation is reassuring and shows improvement of infection.  At this time, he had improvement of pain with morphine here.  We will discharge you home with pain medications, prednisone, lidocaine patch and Robaxin to help with symptoms.  You have close follow-up with infectious disease on 11/30 and I recommend keeping this appointment.    If you develop severe worsening symptoms, loss control bowel or bladder, numbness or tingling in the groin, weakness in the legs, fevers, uncontrolled vomiting, falls or injury or other concerns return to the emergency department.

## 2023-11-27 ENCOUNTER — LAB REQUISITION (OUTPATIENT)
Dept: LAB | Facility: HOSPITAL | Age: 63
End: 2023-11-27
Payer: COMMERCIAL

## 2023-11-27 DIAGNOSIS — M46.56: ICD-10-CM

## 2023-11-27 LAB
ANION GAP SERPL CALCULATED.3IONS-SCNC: 10 MMOL/L (ref 7–15)
BASOPHILS # BLD AUTO: 0 10E3/UL (ref 0–0.2)
BASOPHILS NFR BLD AUTO: 1 %
BUN SERPL-MCNC: 18.6 MG/DL (ref 8–23)
CALCIUM SERPL-MCNC: 9.6 MG/DL (ref 8.8–10.2)
CHLORIDE SERPL-SCNC: 102 MMOL/L (ref 98–107)
CREAT SERPL-MCNC: 1.84 MG/DL (ref 0.51–0.95)
CRP SERPL-MCNC: 9.6 MG/L
DEPRECATED HCO3 PLAS-SCNC: 27 MMOL/L (ref 22–29)
EGFRCR SERPLBLD CKD-EPI 2021: 30 ML/MIN/1.73M2
EOSINOPHIL # BLD AUTO: 0.3 10E3/UL (ref 0–0.7)
EOSINOPHIL NFR BLD AUTO: 5 %
ERYTHROCYTE [DISTWIDTH] IN BLOOD BY AUTOMATED COUNT: 12.4 % (ref 10–15)
ERYTHROCYTE [SEDIMENTATION RATE] IN BLOOD BY WESTERGREN METHOD: 39 MM/HR (ref 0–30)
GLUCOSE SERPL-MCNC: 221 MG/DL (ref 70–99)
HCT VFR BLD AUTO: 33.6 % (ref 35–47)
HGB BLD-MCNC: 11.1 G/DL (ref 11.7–15.7)
IMM GRANULOCYTES # BLD: 0 10E3/UL
IMM GRANULOCYTES NFR BLD: 0 %
LYMPHOCYTES # BLD AUTO: 1.8 10E3/UL (ref 0.8–5.3)
LYMPHOCYTES NFR BLD AUTO: 30 %
MCH RBC QN AUTO: 30.2 PG (ref 26.5–33)
MCHC RBC AUTO-ENTMCNC: 33 G/DL (ref 31.5–36.5)
MCV RBC AUTO: 92 FL (ref 78–100)
MONOCYTES # BLD AUTO: 0.4 10E3/UL (ref 0–1.3)
MONOCYTES NFR BLD AUTO: 6 %
NEUTROPHILS # BLD AUTO: 3.4 10E3/UL (ref 1.6–8.3)
NEUTROPHILS NFR BLD AUTO: 58 %
NRBC # BLD AUTO: 0 10E3/UL
NRBC BLD AUTO-RTO: 0 /100
PLATELET # BLD AUTO: 206 10E3/UL (ref 150–450)
POTASSIUM SERPL-SCNC: 4 MMOL/L (ref 3.4–5.3)
RBC # BLD AUTO: 3.67 10E6/UL (ref 3.8–5.2)
SODIUM SERPL-SCNC: 139 MMOL/L (ref 135–145)
VANCOMYCIN SERPL-MCNC: 18.8 UG/ML
WBC # BLD AUTO: 5.9 10E3/UL (ref 4–11)

## 2023-11-27 PROCEDURE — 85652 RBC SED RATE AUTOMATED: CPT | Performed by: INTERNAL MEDICINE

## 2023-11-27 PROCEDURE — 80048 BASIC METABOLIC PNL TOTAL CA: CPT | Performed by: INTERNAL MEDICINE

## 2023-11-27 PROCEDURE — 80202 ASSAY OF VANCOMYCIN: CPT | Performed by: INTERNAL MEDICINE

## 2023-11-27 PROCEDURE — 86140 C-REACTIVE PROTEIN: CPT | Performed by: INTERNAL MEDICINE

## 2023-11-27 PROCEDURE — 85025 COMPLETE CBC W/AUTO DIFF WBC: CPT | Performed by: INTERNAL MEDICINE

## 2023-11-30 ENCOUNTER — OFFICE VISIT (OUTPATIENT)
Dept: INFECTIOUS DISEASES | Facility: CLINIC | Age: 63
End: 2023-11-30
Payer: COMMERCIAL

## 2023-11-30 VITALS
OXYGEN SATURATION: 93 % | HEART RATE: 103 BPM | BODY MASS INDEX: 29.44 KG/M2 | DIASTOLIC BLOOD PRESSURE: 88 MMHG | SYSTOLIC BLOOD PRESSURE: 150 MMHG | WEIGHT: 160 LBS | HEIGHT: 62 IN

## 2023-11-30 DIAGNOSIS — B96.20 E. COLI UTI: Primary | ICD-10-CM

## 2023-11-30 DIAGNOSIS — N39.0 E. COLI UTI: Primary | ICD-10-CM

## 2023-11-30 PROCEDURE — 99214 OFFICE O/P EST MOD 30 MIN: CPT | Performed by: INTERNAL MEDICINE

## 2023-11-30 ASSESSMENT — PAIN SCALES - GENERAL: PAINLEVEL: SEVERE PAIN (6)

## 2023-11-30 NOTE — PROGRESS NOTES
CLINIC FOLLOW UP NOTE:    Original Reason for consultation, site patient seen:    Interval history:  She has had no improvement on empiric vanco and rocephin for:                                    IMPRESSION:  1.  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  Careful attention to the numbering convention is recommended prior to any future percutaneous or surgical intervention.   2.  Edema, and enhancement is demonstrated centered on the L3/L4 and L4/L5 facet joints which extend into the dorsal aspect of the spinal canal with solid enhancement. Overall constellation of findings are consistent with septic arthritis at L3/L4 and   L4/L5 with extension into the dorsal epidural space with early dorsal epidural phlegmon at L3/L4 and L4/L5. Moderate spinal canal narrowing. Moderate spinal canal narrowing at the L3/L4 and L4/L5 levels.  3.  Edema within the left paraspinal musculature at L3/L4 and L4/L5, consistent with myositis.     In clinic today she has a hard time standing from a sitting position and can't bend over to pick a dropped item.  Can not walk smoothly.  I am going to pull the picc.  I had planned MRI next month but need to refer to surgery first.        Current antibiotics and duration plan:above    Reviewed PAST MEDICAL HISTORY,  family and social history      Examination:  Alert, awake  Vitals tabulated above, reviewed  Neck supple  Sclera OK  CARDIOVASCULAR regular rate and rhythm, no mumur  Lungs CLEAR TO AUSCULTATION   Abdomen soft, NT/ND, absent HEPATOSPLENOMEGALY  Skin normal  Joints normal  Neurologic exam severe back pain, impacting gait and mobility of spinal movement  Wound:  N/A    Lab Data/New Imaging/Medication levels/Microbiologic data:  CRP   Component      Latest Ref Rng 10/30/2023  10:00 AM 11/6/2023  8:45 AM 11/13/2023  9:00 AM 11/20/2023  8:45 AM 11/27/2023  9:40 AM   CRP Inflammation      <5.00 mg/L 27.19 (H)  8.93 (H)  3.50  4.72  9.60 (H)          IMPRESSION:  Empiric treatment for bacterial diskitis has not worked given 6wks duration  Vincentian host could be at risk for amin disease or is this another condition entirely?    PLAN:  Refer to Dr. Ambriz for their opinion and expertise  Messaged him and his PA  Stop abx, not working, not sure of dx etc  If really bad at home back to ED for admission, pain control and consultation.  Corpectomy or an open bx or open look is what I would be thinking we do next for her  Can't work for 2 more months    Thank you for allowing me to continue care of this patient.    Sincerely:      ART Luther MD  [facility]  [facility] [phone]

## 2025-05-09 ENCOUNTER — HOSPITAL ENCOUNTER (OUTPATIENT)
Facility: HOSPITAL | Age: 65
Setting detail: OBSERVATION
Discharge: HOME | End: 2025-05-10
Attending: EMERGENCY MEDICINE | Admitting: INTERNAL MEDICINE
Payer: COMMERCIAL

## 2025-05-09 ENCOUNTER — APPOINTMENT (OUTPATIENT)
Dept: CT IMAGING | Facility: HOSPITAL | Age: 65
End: 2025-05-09
Attending: EMERGENCY MEDICINE
Payer: COMMERCIAL

## 2025-05-09 ENCOUNTER — APPOINTMENT (OUTPATIENT)
Dept: RADIOLOGY | Facility: HOSPITAL | Age: 65
End: 2025-05-09
Attending: EMERGENCY MEDICINE
Payer: COMMERCIAL

## 2025-05-09 DIAGNOSIS — I10 ESSENTIAL HYPERTENSION: ICD-10-CM

## 2025-05-09 DIAGNOSIS — R07.9 CHEST PAIN, UNSPECIFIED TYPE: ICD-10-CM

## 2025-05-09 LAB
ALBUMIN SERPL BCG-MCNC: 3.9 G/DL (ref 3.5–5.2)
ALBUMIN UR-MCNC: 70 MG/DL
ALP SERPL-CCNC: 80 U/L (ref 40–150)
ALT SERPL W P-5'-P-CCNC: 15 U/L (ref 0–50)
ANION GAP SERPL CALCULATED.3IONS-SCNC: 10 MMOL/L (ref 7–15)
APPEARANCE UR: CLEAR
AST SERPL W P-5'-P-CCNC: 14 U/L (ref 0–45)
BACTERIA #/AREA URNS HPF: ABNORMAL /HPF
BASOPHILS # BLD AUTO: 0 10E3/UL (ref 0–0.2)
BASOPHILS NFR BLD AUTO: 0 %
BILIRUB DIRECT SERPL-MCNC: <0.08 MG/DL (ref 0–0.3)
BILIRUB SERPL-MCNC: 0.3 MG/DL
BILIRUB UR QL STRIP: NEGATIVE
BUN SERPL-MCNC: 22.3 MG/DL (ref 8–23)
CALCIUM SERPL-MCNC: 9.2 MG/DL (ref 8.8–10.4)
CHLORIDE SERPL-SCNC: 99 MMOL/L (ref 98–107)
COLOR UR AUTO: COLORLESS
CREAT SERPL-MCNC: 1.79 MG/DL (ref 0.51–0.95)
D DIMER PPP FEU-MCNC: 0.54 UG/ML FEU (ref 0–0.5)
EGFRCR SERPLBLD CKD-EPI 2021: 31 ML/MIN/1.73M2
EOSINOPHIL # BLD AUTO: 0.1 10E3/UL (ref 0–0.7)
EOSINOPHIL NFR BLD AUTO: 2 %
ERYTHROCYTE [DISTWIDTH] IN BLOOD BY AUTOMATED COUNT: 13.1 % (ref 10–15)
FLUAV RNA SPEC QL NAA+PROBE: NEGATIVE
FLUBV RNA RESP QL NAA+PROBE: NEGATIVE
GLUCOSE SERPL-MCNC: 139 MG/DL (ref 70–99)
GLUCOSE UR STRIP-MCNC: NEGATIVE MG/DL
HCO3 SERPL-SCNC: 23 MMOL/L (ref 22–29)
HCT VFR BLD AUTO: 35.5 % (ref 35–47)
HGB BLD-MCNC: 12.3 G/DL (ref 11.7–15.7)
HGB UR QL STRIP: NEGATIVE
HOLD SPECIMEN: NORMAL
IMM GRANULOCYTES # BLD: 0 10E3/UL
IMM GRANULOCYTES NFR BLD: 0 %
KETONES UR STRIP-MCNC: NEGATIVE MG/DL
LACTATE SERPL-SCNC: 0.4 MMOL/L (ref 0.7–2)
LEUKOCYTE ESTERASE UR QL STRIP: NEGATIVE
LIPASE SERPL-CCNC: 27 U/L (ref 13–60)
LYMPHOCYTES # BLD AUTO: 1.8 10E3/UL (ref 0.8–5.3)
LYMPHOCYTES NFR BLD AUTO: 27 %
MCH RBC QN AUTO: 31.8 PG (ref 26.5–33)
MCHC RBC AUTO-ENTMCNC: 34.6 G/DL (ref 31.5–36.5)
MCV RBC AUTO: 92 FL (ref 78–100)
MONOCYTES # BLD AUTO: 0.4 10E3/UL (ref 0–1.3)
MONOCYTES NFR BLD AUTO: 7 %
NEUTROPHILS # BLD AUTO: 4.1 10E3/UL (ref 1.6–8.3)
NEUTROPHILS NFR BLD AUTO: 64 %
NITRATE UR QL: NEGATIVE
NRBC # BLD AUTO: 0 10E3/UL
NRBC BLD AUTO-RTO: 0 /100
PH UR STRIP: 6.5 [PH] (ref 5–7)
PLATELET # BLD AUTO: 204 10E3/UL (ref 150–450)
POTASSIUM SERPL-SCNC: 4.1 MMOL/L (ref 3.4–5.3)
PROCALCITONIN SERPL IA-MCNC: 0.06 NG/ML
PROT SERPL-MCNC: 6.4 G/DL (ref 6.4–8.3)
RBC # BLD AUTO: 3.87 10E6/UL (ref 3.8–5.2)
RBC URINE: 1 /HPF
RSV RNA SPEC NAA+PROBE: NEGATIVE
SARS-COV-2 RNA RESP QL NAA+PROBE: NEGATIVE
SODIUM SERPL-SCNC: 132 MMOL/L (ref 135–145)
SP GR UR STRIP: 1 (ref 1–1.03)
SQUAMOUS EPITHELIAL: <1 /HPF
TROPONIN T SERPL HS-MCNC: 10 NG/L
TROPONIN T SERPL HS-MCNC: 9 NG/L
UROBILINOGEN UR STRIP-MCNC: NORMAL MG/DL
WBC # BLD AUTO: 6.5 10E3/UL (ref 4–11)
WBC URINE: 3 /HPF

## 2025-05-09 PROCEDURE — 250N000011 HC RX IP 250 OP 636: Performed by: EMERGENCY MEDICINE

## 2025-05-09 PROCEDURE — 71045 X-RAY EXAM CHEST 1 VIEW: CPT

## 2025-05-09 PROCEDURE — 99223 1ST HOSP IP/OBS HIGH 75: CPT | Performed by: INTERNAL MEDICINE

## 2025-05-09 PROCEDURE — 87637 SARSCOV2&INF A&B&RSV AMP PRB: CPT | Performed by: EMERGENCY MEDICINE

## 2025-05-09 PROCEDURE — 83690 ASSAY OF LIPASE: CPT | Performed by: EMERGENCY MEDICINE

## 2025-05-09 PROCEDURE — 84484 ASSAY OF TROPONIN QUANT: CPT | Performed by: EMERGENCY MEDICINE

## 2025-05-09 PROCEDURE — 99285 EMERGENCY DEPT VISIT HI MDM: CPT | Mod: 25

## 2025-05-09 PROCEDURE — 84145 PROCALCITONIN (PCT): CPT | Performed by: EMERGENCY MEDICINE

## 2025-05-09 PROCEDURE — 96374 THER/PROPH/DIAG INJ IV PUSH: CPT

## 2025-05-09 PROCEDURE — 81001 URINALYSIS AUTO W/SCOPE: CPT | Performed by: EMERGENCY MEDICINE

## 2025-05-09 PROCEDURE — 71275 CT ANGIOGRAPHY CHEST: CPT

## 2025-05-09 PROCEDURE — 93005 ELECTROCARDIOGRAM TRACING: CPT | Performed by: EMERGENCY MEDICINE

## 2025-05-09 PROCEDURE — 85379 FIBRIN DEGRADATION QUANT: CPT | Performed by: EMERGENCY MEDICINE

## 2025-05-09 PROCEDURE — 82465 ASSAY BLD/SERUM CHOLESTEROL: CPT | Performed by: INTERNAL MEDICINE

## 2025-05-09 PROCEDURE — 82565 ASSAY OF CREATININE: CPT | Performed by: EMERGENCY MEDICINE

## 2025-05-09 PROCEDURE — 85025 COMPLETE CBC W/AUTO DIFF WBC: CPT | Performed by: EMERGENCY MEDICINE

## 2025-05-09 PROCEDURE — 83605 ASSAY OF LACTIC ACID: CPT | Performed by: EMERGENCY MEDICINE

## 2025-05-09 PROCEDURE — 250N000013 HC RX MED GY IP 250 OP 250 PS 637: Performed by: EMERGENCY MEDICINE

## 2025-05-09 PROCEDURE — G0378 HOSPITAL OBSERVATION PER HR: HCPCS

## 2025-05-09 PROCEDURE — 36415 COLL VENOUS BLD VENIPUNCTURE: CPT | Performed by: EMERGENCY MEDICINE

## 2025-05-09 PROCEDURE — 83036 HEMOGLOBIN GLYCOSYLATED A1C: CPT | Performed by: INTERNAL MEDICINE

## 2025-05-09 PROCEDURE — 82040 ASSAY OF SERUM ALBUMIN: CPT | Performed by: EMERGENCY MEDICINE

## 2025-05-09 RX ORDER — DOCUSATE SODIUM 100 MG/1
100 CAPSULE, LIQUID FILLED ORAL 2 TIMES DAILY
Status: DISCONTINUED | OUTPATIENT
Start: 2025-05-10 | End: 2025-05-10 | Stop reason: HOSPADM

## 2025-05-09 RX ORDER — IOPAMIDOL 755 MG/ML
60 INJECTION, SOLUTION INTRAVASCULAR ONCE
Status: COMPLETED | OUTPATIENT
Start: 2025-05-09 | End: 2025-05-09

## 2025-05-09 RX ORDER — ACETAMINOPHEN 325 MG/1
650 TABLET ORAL ONCE
Status: COMPLETED | OUTPATIENT
Start: 2025-05-09 | End: 2025-05-09

## 2025-05-09 RX ORDER — ONDANSETRON 4 MG/1
4 TABLET, ORALLY DISINTEGRATING ORAL EVERY 6 HOURS PRN
Status: DISCONTINUED | OUTPATIENT
Start: 2025-05-09 | End: 2025-05-10 | Stop reason: HOSPADM

## 2025-05-09 RX ORDER — MAGNESIUM HYDROXIDE/ALUMINUM HYDROXICE/SIMETHICONE 120; 1200; 1200 MG/30ML; MG/30ML; MG/30ML
30 SUSPENSION ORAL ONCE
Status: COMPLETED | OUTPATIENT
Start: 2025-05-09 | End: 2025-05-09

## 2025-05-09 RX ORDER — ONDANSETRON 2 MG/ML
4 INJECTION INTRAMUSCULAR; INTRAVENOUS EVERY 6 HOURS PRN
Status: DISCONTINUED | OUTPATIENT
Start: 2025-05-09 | End: 2025-05-10 | Stop reason: HOSPADM

## 2025-05-09 RX ADMIN — IOPAMIDOL 60 ML: 755 INJECTION, SOLUTION INTRAVENOUS at 23:05

## 2025-05-09 RX ADMIN — FAMOTIDINE 20 MG: 10 INJECTION, SOLUTION INTRAVENOUS at 22:33

## 2025-05-09 RX ADMIN — ALUMINUM HYDROXIDE, MAGNESIUM HYDROXIDE, AND SIMETHICONE 30 ML: 200; 200; 20 SUSPENSION ORAL at 22:33

## 2025-05-09 ASSESSMENT — COLUMBIA-SUICIDE SEVERITY RATING SCALE - C-SSRS
1. IN THE PAST MONTH, HAVE YOU WISHED YOU WERE DEAD OR WISHED YOU COULD GO TO SLEEP AND NOT WAKE UP?: NO
6. HAVE YOU EVER DONE ANYTHING, STARTED TO DO ANYTHING, OR PREPARED TO DO ANYTHING TO END YOUR LIFE?: NO
2. HAVE YOU ACTUALLY HAD ANY THOUGHTS OF KILLING YOURSELF IN THE PAST MONTH?: NO

## 2025-05-09 ASSESSMENT — ACTIVITIES OF DAILY LIVING (ADL)
ADLS_ACUITY_SCORE: 56

## 2025-05-09 NOTE — ED TRIAGE NOTES
"Pt here d/t intermittent aching CP starting today. Began amlodipine last night in place of lisinopril per her primary. Denies radiation of pain, but endorses \"stiff shoulders\", weakness, feeling tremulous, HA     Triage Assessment (Adult)       Row Name 05/09/25 6677          Triage Assessment    Airway WDL WDL        Respiratory WDL    Respiratory WDL WDL        Peripheral/Neurovascular WDL    Peripheral Neurovascular WDL WDL        Cognitive/Neuro/Behavioral WDL    Cognitive/Neuro/Behavioral WDL WDL                     "

## 2025-05-10 ENCOUNTER — APPOINTMENT (OUTPATIENT)
Dept: OCCUPATIONAL THERAPY | Facility: HOSPITAL | Age: 65
End: 2025-05-10
Attending: INTERNAL MEDICINE
Payer: COMMERCIAL

## 2025-05-10 ENCOUNTER — APPOINTMENT (OUTPATIENT)
Dept: PHYSICAL THERAPY | Facility: HOSPITAL | Age: 65
End: 2025-05-10
Attending: INTERNAL MEDICINE
Payer: COMMERCIAL

## 2025-05-10 ENCOUNTER — APPOINTMENT (OUTPATIENT)
Dept: ULTRASOUND IMAGING | Facility: HOSPITAL | Age: 65
End: 2025-05-10
Attending: INTERNAL MEDICINE
Payer: COMMERCIAL

## 2025-05-10 ENCOUNTER — APPOINTMENT (OUTPATIENT)
Dept: CARDIOLOGY | Facility: HOSPITAL | Age: 65
End: 2025-05-10
Attending: INTERNAL MEDICINE
Payer: COMMERCIAL

## 2025-05-10 VITALS
SYSTOLIC BLOOD PRESSURE: 143 MMHG | WEIGHT: 153.5 LBS | TEMPERATURE: 97.7 F | RESPIRATION RATE: 17 BRPM | HEART RATE: 80 BPM | OXYGEN SATURATION: 98 % | DIASTOLIC BLOOD PRESSURE: 67 MMHG | BODY MASS INDEX: 28.25 KG/M2 | HEIGHT: 62 IN

## 2025-05-10 PROBLEM — M25.551 RIGHT HIP PAIN: Status: ACTIVE | Noted: 2022-07-21

## 2025-05-10 LAB
ALBUMIN SERPL BCG-MCNC: 3.5 G/DL (ref 3.5–5.2)
ALP SERPL-CCNC: 65 U/L (ref 40–150)
ALT SERPL W P-5'-P-CCNC: 14 U/L (ref 0–50)
ANION GAP SERPL CALCULATED.3IONS-SCNC: 7 MMOL/L (ref 7–15)
AST SERPL W P-5'-P-CCNC: 9 U/L (ref 0–45)
ATRIAL RATE - MUSE: 88 BPM
BASOPHILS # BLD AUTO: 0 10E3/UL (ref 0–0.2)
BASOPHILS NFR BLD AUTO: 0 %
BILIRUB SERPL-MCNC: 0.3 MG/DL
BUN SERPL-MCNC: 21.4 MG/DL (ref 8–23)
CALCIUM SERPL-MCNC: 8.8 MG/DL (ref 8.8–10.4)
CHLORIDE SERPL-SCNC: 102 MMOL/L (ref 98–107)
CHOLEST SERPL-MCNC: 213 MG/DL
CREAT SERPL-MCNC: 1.91 MG/DL (ref 0.51–0.95)
DIASTOLIC BLOOD PRESSURE - MUSE: NORMAL MMHG
EGFRCR SERPLBLD CKD-EPI 2021: 29 ML/MIN/1.73M2
EOSINOPHIL # BLD AUTO: 0.2 10E3/UL (ref 0–0.7)
EOSINOPHIL NFR BLD AUTO: 3 %
ERYTHROCYTE [DISTWIDTH] IN BLOOD BY AUTOMATED COUNT: 13 % (ref 10–15)
EST. AVERAGE GLUCOSE BLD GHB EST-MCNC: 134 MG/DL
GLUCOSE BLDC GLUCOMTR-MCNC: 116 MG/DL (ref 70–99)
GLUCOSE BLDC GLUCOMTR-MCNC: 136 MG/DL (ref 70–99)
GLUCOSE BLDC GLUCOMTR-MCNC: 194 MG/DL (ref 70–99)
GLUCOSE BLDC GLUCOMTR-MCNC: 98 MG/DL (ref 70–99)
GLUCOSE SERPL-MCNC: 155 MG/DL (ref 70–99)
HBA1C MFR BLD: 6.3 %
HCO3 SERPL-SCNC: 25 MMOL/L (ref 22–29)
HCT VFR BLD AUTO: 34.7 % (ref 35–47)
HDLC SERPL-MCNC: 54 MG/DL
HGB BLD-MCNC: 12.2 G/DL (ref 11.7–15.7)
IMM GRANULOCYTES # BLD: 0 10E3/UL
IMM GRANULOCYTES NFR BLD: 0 %
INTERPRETATION ECG - MUSE: NORMAL
LDLC SERPL CALC-MCNC: 115 MG/DL
LVEF ECHO: NORMAL
LYMPHOCYTES # BLD AUTO: 2 10E3/UL (ref 0.8–5.3)
LYMPHOCYTES NFR BLD AUTO: 34 %
MCH RBC QN AUTO: 32.6 PG (ref 26.5–33)
MCHC RBC AUTO-ENTMCNC: 35.2 G/DL (ref 31.5–36.5)
MCV RBC AUTO: 93 FL (ref 78–100)
MONOCYTES # BLD AUTO: 0.4 10E3/UL (ref 0–1.3)
MONOCYTES NFR BLD AUTO: 6 %
NEUTROPHILS # BLD AUTO: 3.4 10E3/UL (ref 1.6–8.3)
NEUTROPHILS NFR BLD AUTO: 57 %
NONHDLC SERPL-MCNC: 159 MG/DL
NRBC # BLD AUTO: 0 10E3/UL
NRBC BLD AUTO-RTO: 0 /100
P AXIS - MUSE: 68 DEGREES
PLATELET # BLD AUTO: 186 10E3/UL (ref 150–450)
POTASSIUM SERPL-SCNC: 3.8 MMOL/L (ref 3.4–5.3)
PR INTERVAL - MUSE: 154 MS
PROT SERPL-MCNC: 5.8 G/DL (ref 6.4–8.3)
QRS DURATION - MUSE: 96 MS
QT - MUSE: 370 MS
QTC - MUSE: 447 MS
R AXIS - MUSE: -50 DEGREES
RBC # BLD AUTO: 3.74 10E6/UL (ref 3.8–5.2)
SODIUM SERPL-SCNC: 134 MMOL/L (ref 135–145)
SYSTOLIC BLOOD PRESSURE - MUSE: NORMAL MMHG
T AXIS - MUSE: 73 DEGREES
TRIGL SERPL-MCNC: 221 MG/DL
VENTRICULAR RATE- MUSE: 88 BPM
WBC # BLD AUTO: 6 10E3/UL (ref 4–11)

## 2025-05-10 PROCEDURE — 250N000012 HC RX MED GY IP 250 OP 636 PS 637: Performed by: INTERNAL MEDICINE

## 2025-05-10 PROCEDURE — 85025 COMPLETE CBC W/AUTO DIFF WBC: CPT | Performed by: INTERNAL MEDICINE

## 2025-05-10 PROCEDURE — 250N000013 HC RX MED GY IP 250 OP 250 PS 637: Performed by: INTERNAL MEDICINE

## 2025-05-10 PROCEDURE — 97165 OT EVAL LOW COMPLEX 30 MIN: CPT | Mod: GO

## 2025-05-10 PROCEDURE — 82962 GLUCOSE BLOOD TEST: CPT

## 2025-05-10 PROCEDURE — 76770 US EXAM ABDO BACK WALL COMP: CPT

## 2025-05-10 PROCEDURE — 96375 TX/PRO/DX INJ NEW DRUG ADDON: CPT | Mod: 59

## 2025-05-10 PROCEDURE — 97116 GAIT TRAINING THERAPY: CPT | Mod: GP

## 2025-05-10 PROCEDURE — G0378 HOSPITAL OBSERVATION PER HR: HCPCS

## 2025-05-10 PROCEDURE — 82247 BILIRUBIN TOTAL: CPT | Performed by: INTERNAL MEDICINE

## 2025-05-10 PROCEDURE — 250N000011 HC RX IP 250 OP 636: Mod: JZ | Performed by: INTERNAL MEDICINE

## 2025-05-10 PROCEDURE — 99239 HOSP IP/OBS DSCHRG MGMT >30: CPT | Performed by: HOSPITALIST

## 2025-05-10 PROCEDURE — 93306 TTE W/DOPPLER COMPLETE: CPT | Mod: 26 | Performed by: STUDENT IN AN ORGANIZED HEALTH CARE EDUCATION/TRAINING PROGRAM

## 2025-05-10 PROCEDURE — 250N000013 HC RX MED GY IP 250 OP 250 PS 637: Performed by: HOSPITALIST

## 2025-05-10 PROCEDURE — 36415 COLL VENOUS BLD VENIPUNCTURE: CPT | Performed by: INTERNAL MEDICINE

## 2025-05-10 PROCEDURE — 97530 THERAPEUTIC ACTIVITIES: CPT | Mod: GP

## 2025-05-10 PROCEDURE — 96376 TX/PRO/DX INJ SAME DRUG ADON: CPT

## 2025-05-10 PROCEDURE — 97161 PT EVAL LOW COMPLEX 20 MIN: CPT | Mod: GP

## 2025-05-10 PROCEDURE — 93306 TTE W/DOPPLER COMPLETE: CPT

## 2025-05-10 RX ORDER — ACETAMINOPHEN 650 MG/1
650 SUPPOSITORY RECTAL EVERY 4 HOURS PRN
Status: DISCONTINUED | OUTPATIENT
Start: 2025-05-10 | End: 2025-05-10 | Stop reason: HOSPADM

## 2025-05-10 RX ORDER — NICOTINE 21 MG/24HR
1 PATCH, TRANSDERMAL 24 HOURS TRANSDERMAL DAILY
Status: DISCONTINUED | OUTPATIENT
Start: 2025-05-10 | End: 2025-05-10 | Stop reason: HOSPADM

## 2025-05-10 RX ORDER — VITAMIN B COMPLEX
1 CAPSULE ORAL DAILY
COMMUNITY

## 2025-05-10 RX ORDER — LYSINE HCL 500 MG
1 TABLET ORAL DAILY
COMMUNITY
Start: 2025-03-31

## 2025-05-10 RX ORDER — ATORVASTATIN CALCIUM 10 MG/1
20 TABLET, FILM COATED ORAL DAILY
Status: DISCONTINUED | OUTPATIENT
Start: 2025-05-10 | End: 2025-05-10 | Stop reason: HOSPADM

## 2025-05-10 RX ORDER — ACETAMINOPHEN 500 MG
500-1000 TABLET ORAL EVERY 6 HOURS PRN
COMMUNITY

## 2025-05-10 RX ORDER — HYDROMORPHONE HYDROCHLORIDE 1 MG/ML
0.5 INJECTION, SOLUTION INTRAMUSCULAR; INTRAVENOUS; SUBCUTANEOUS ONCE
Refills: 0 | Status: COMPLETED | OUTPATIENT
Start: 2025-05-10 | End: 2025-05-10

## 2025-05-10 RX ORDER — AMLODIPINE BESYLATE 5 MG/1
5 TABLET ORAL
Status: DISCONTINUED | OUTPATIENT
Start: 2025-05-10 | End: 2025-05-10 | Stop reason: HOSPADM

## 2025-05-10 RX ORDER — NICOTINE 21 MG/24HR
1 PATCH, TRANSDERMAL 24 HOURS TRANSDERMAL EVERY 24 HOURS
COMMUNITY
Start: 2024-05-22

## 2025-05-10 RX ORDER — NALOXONE HYDROCHLORIDE 0.4 MG/ML
0.2 INJECTION, SOLUTION INTRAMUSCULAR; INTRAVENOUS; SUBCUTANEOUS
Status: DISCONTINUED | OUTPATIENT
Start: 2025-05-10 | End: 2025-05-10 | Stop reason: HOSPADM

## 2025-05-10 RX ORDER — ATORVASTATIN CALCIUM 20 MG/1
20 TABLET, FILM COATED ORAL DAILY
COMMUNITY
Start: 2025-04-29 | End: 2026-04-29

## 2025-05-10 RX ORDER — POLYETHYLENE GLYCOL 3350 17 G
2 POWDER IN PACKET (EA) ORAL PRN
COMMUNITY
Start: 2025-04-29

## 2025-05-10 RX ORDER — TRAMADOL HYDROCHLORIDE 50 MG/1
50 TABLET ORAL EVERY 6 HOURS PRN
Status: DISCONTINUED | OUTPATIENT
Start: 2025-05-10 | End: 2025-05-10 | Stop reason: HOSPADM

## 2025-05-10 RX ORDER — INSULIN ASPART 100 [IU]/ML
3 INJECTION, SOLUTION INTRAVENOUS; SUBCUTANEOUS
COMMUNITY
Start: 2025-03-31 | End: 2025-09-27

## 2025-05-10 RX ORDER — NICOTINE POLACRILEX 4 MG
15-30 LOZENGE BUCCAL
Status: DISCONTINUED | OUTPATIENT
Start: 2025-05-10 | End: 2025-05-10 | Stop reason: HOSPADM

## 2025-05-10 RX ORDER — THERA TABS 400 MCG
1 TAB ORAL DAILY
Status: DISCONTINUED | OUTPATIENT
Start: 2025-05-10 | End: 2025-05-10 | Stop reason: HOSPADM

## 2025-05-10 RX ORDER — AMLODIPINE BESYLATE 10 MG/1
0.5 TABLET ORAL
COMMUNITY
Start: 2025-04-29

## 2025-05-10 RX ORDER — HYDROMORPHONE HYDROCHLORIDE 2 MG/1
2 TABLET ORAL EVERY 6 HOURS PRN
Refills: 0 | Status: DISCONTINUED | OUTPATIENT
Start: 2025-05-10 | End: 2025-05-10 | Stop reason: HOSPADM

## 2025-05-10 RX ORDER — ACETAMINOPHEN 325 MG/1
650 TABLET ORAL EVERY 4 HOURS PRN
Status: DISCONTINUED | OUTPATIENT
Start: 2025-05-10 | End: 2025-05-10 | Stop reason: HOSPADM

## 2025-05-10 RX ORDER — HYDRALAZINE HYDROCHLORIDE 20 MG/ML
10 INJECTION INTRAMUSCULAR; INTRAVENOUS EVERY 4 HOURS PRN
Status: DISCONTINUED | OUTPATIENT
Start: 2025-05-10 | End: 2025-05-10 | Stop reason: HOSPADM

## 2025-05-10 RX ORDER — NALOXONE HYDROCHLORIDE 0.4 MG/ML
0.4 INJECTION, SOLUTION INTRAMUSCULAR; INTRAVENOUS; SUBCUTANEOUS
Status: DISCONTINUED | OUTPATIENT
Start: 2025-05-10 | End: 2025-05-10 | Stop reason: HOSPADM

## 2025-05-10 RX ORDER — DEXTROSE MONOHYDRATE 25 G/50ML
25-50 INJECTION, SOLUTION INTRAVENOUS
Status: DISCONTINUED | OUTPATIENT
Start: 2025-05-10 | End: 2025-05-10 | Stop reason: HOSPADM

## 2025-05-10 RX ORDER — LISINOPRIL 10 MG/1
10 TABLET ORAL DAILY
Qty: 30 TABLET | Refills: 0 | Status: SHIPPED | OUTPATIENT
Start: 2025-05-10

## 2025-05-10 RX ORDER — LACTOBACILLUS RHAMNOSUS GG 10B CELL
1 CAPSULE ORAL DAILY
Status: DISCONTINUED | OUTPATIENT
Start: 2025-05-10 | End: 2025-05-10 | Stop reason: HOSPADM

## 2025-05-10 RX ADMIN — INSULIN ASPART 2 UNITS: 100 INJECTION, SOLUTION INTRAVENOUS; SUBCUTANEOUS at 11:32

## 2025-05-10 RX ADMIN — HYDROMORPHONE HYDROCHLORIDE 0.5 MG: 1 INJECTION, SOLUTION INTRAMUSCULAR; INTRAVENOUS; SUBCUTANEOUS at 02:34

## 2025-05-10 RX ADMIN — HYDRALAZINE HYDROCHLORIDE 10 MG: 20 INJECTION INTRAMUSCULAR; INTRAVENOUS at 02:02

## 2025-05-10 RX ADMIN — B-COMPLEX W/ C & FOLIC ACID TAB 1 TABLET: TAB at 08:28

## 2025-05-10 RX ADMIN — MULTIVITAMIN TABLET 1 TABLET: TABLET at 08:26

## 2025-05-10 RX ADMIN — INSULIN GLARGINE 6 UNITS: 100 INJECTION, SOLUTION SUBCUTANEOUS at 08:18

## 2025-05-10 RX ADMIN — AMLODIPINE BESYLATE 5 MG: 5 TABLET ORAL at 08:26

## 2025-05-10 RX ADMIN — DOCUSATE SODIUM 100 MG: 100 CAPSULE, LIQUID FILLED ORAL at 08:26

## 2025-05-10 RX ADMIN — Medication 1 TABLET: at 08:25

## 2025-05-10 RX ADMIN — FAMOTIDINE 20 MG: 10 INJECTION, SOLUTION INTRAVENOUS at 08:21

## 2025-05-10 RX ADMIN — Medication 2 CAPSULE: at 08:27

## 2025-05-10 RX ADMIN — ACETAMINOPHEN 650 MG: 325 TABLET ORAL at 14:19

## 2025-05-10 RX ADMIN — ATORVASTATIN CALCIUM 20 MG: 10 TABLET, FILM COATED ORAL at 08:25

## 2025-05-10 RX ADMIN — Medication 1 CAPSULE: at 08:25

## 2025-05-10 ASSESSMENT — ACTIVITIES OF DAILY LIVING (ADL)
ADLS_ACUITY_SCORE: 37
ADLS_ACUITY_SCORE: 39

## 2025-05-10 NOTE — PROGRESS NOTES
05/10/25 1530   Appointment Info   Signing Clinician's Name / Credentials (PT) Michelle Chand DPT   Quick Adds   Quick Adds Certification   Living Environment   People in Home grandchild(carolyn)   Current Living Arrangements house   Home Accessibility stairs to enter home   Number of Stairs, Main Entrance 4  (x 2)   Stair Railings, Main Entrance railing on left side (ascending)   Self-Care   Usual Activity Tolerance moderate  (needs hip replacement)   Current Activity Tolerance fair   Equipment Currently Used at Home walker, rolling;cane, straight   Fall history within last six months no   Activity/Exercise/Self-Care Comment ind w/ ADLs at baseline   General Information   Onset of Illness/Injury or Date of Surgery 05/09/25   Referring Physician Paige Rangel MD   Patient/Family Therapy Goals Statement (PT) return home   Pertinent History of Current Problem (include personal factors and/or comorbidities that impact the POC) Ana Bunn is a 64 year old female with a medical history of type 2 DM, hypertension, hyperlipidemia, CKD stage III, nicotine use and other medical comorbdbities who is being admitted for atypical chest pain and renal failure with possible uremic symptoms.   Existing Precautions/Restrictions fall   Cognition   Affect/Mental Status (Cognition) WFL   Pain Assessment   Patient Currently in Pain Yes, see Vital Sign flowsheet  (R hip)   Range of Motion (ROM)   Range of Motion ROM deficits secondary to pain  (R hip limited)   ROM Comment all other WFL   Strength (Manual Muscle Testing)   Strength (Manual Muscle Testing) Deficits observed during functional mobility  (R hip limited)   Strength Comments all other WFL   Bed Mobility   Bed Mobility supine-sit   Supine-Sit [place] (Bed Mobility) independent   Transfers   Transfers sit-stand transfer   Sit-Stand Transfer   Sit-Stand [place] (Transfers) supervision   Assistive Device (Sit-Stand Transfers)   (none)   Comment, (Sit-Stand  Transfer) unsteady   Gait/Stairs (Locomotion)   [place] Level (Gait) supervision   Assistive Device (Gait)   (none)   Distance in Feet (Gait) 5   Pattern (Gait) step-to   Deviations/Abnormal Patterns (Gait) antalgic   Comment, (Gait/Stairs) R sided pain. Unable to amb further at this time d/t pain w/o AD. Stairs deferred initially.   Balance   Balance other (describe)   Balance Comments unsteady dynamic standing   Clinical Impression   Criteria for Skilled Therapeutic Intervention Yes, treatment indicated   PT Diagnosis (PT) impaired functional mobility, gait abnormality   Influenced by the following impairments decreased strength, decreased endurance   Functional limitations due to impairments gait, transfers, bed mob, stairs   Clinical Presentation (PT Evaluation Complexity) stable   Clinical Presentation Rationale pt presents as medically diagnosed   Clinical Decision Making (Complexity) low complexity   Planned Therapy Interventions (PT) balance training;bed mobility training;gait training;home exercise program;neuromuscular re-education;patient/family education;strengthening;transfer training;stair training   Risk & Benefits of therapy have been explained evaluation/treatment results reviewed;patient   PT Total Evaluation Time   PT Eval, Low Complexity Minutes (62817) 10   Therapy Certification   Start of care date 05/10/25   Certification date from 05/10/25   Certification date to 05/17/25   Medical Diagnosis chest pain   Physical Therapy Goals   PT Frequency One time eval and treatment only   PT Predicted Duration/Target Date for Goal Attainment 05/10/25   PT Goals Transfers;Gait;Stairs   PT: Transfers Supervision/stand-by assist;Sit to/from stand;Bed to/from chair;Assistive device   PT: Gait Supervision/stand-by assist;Assistive device;Rolling walker;Greater than 200 feet   PT: Stairs Supervision/stand-by assist;4 stairs;Rail on left   Interventions   Interventions Quick Adds Gait Training;Therapeutic  Activity   Therapeutic Activity   Therapeutic Activities: dynamic activities to improve functional performance Minutes (28993) 12   Symptoms Noted During/After Treatment None   Treatment Detail/Skilled Intervention static standing x 5 minutes with SBA, FWW. Mostly putting weight on LLE instead of equal distribution. Inc time spent educating pt on sit <> stand technique with kicking RLE forward for less pain through RLE. Pt performed x 4 reps with SBA, no LOB. Discussed some exercises to perform sitting EOB ro in bed as needed at home, pt agreeable. After PT, left in care of RN.   Gait Training   Gait Training Minutes (14074) 12   Symptoms Noted During/After Treatment (Gait Training) increased pain   Treatment Detail/Skilled Intervention amb x 200' x 2 reps with 4WW, cues for upright and keeping walker closer to body. no LOB noted, moves quicker with more time spent ambulating. Incline/decline with CGA and 4WW. Stairs x 4 reps with nonreciprocal technique, L rail. SBA amb, CGA stairs   Distance in Feet 200' x 2   [place] Level (Gait Training) stand-by assist   Physical Assistance Level (Gait Training) supervision;verbal cues;1 person assist   Weight Bearing (Gait Training) weight-bearing as tolerated   Assistive Device (Gait Training) rolling walker  (4WW)   Gait Analysis Deviations decreased heron;decreased stride length   Impairments (Gait Analysis/Training) pain   Stair Railings present on left side   Physical Assist/Nonphysical Assist (Stairs) supervision   Level of [place] (Stairs) contact guard   PT Discharge Planning   PT Plan dc PT   PT Discharge Recommendation (DC Rec) home with assist   PT Rationale for DC Rec outpatient PT as needed/wanted   PT Brief overview of current status amb x 400' with 4WW, stairs x 4 CGA   PT Total Distance Amb During Session (feet) 405   PT Equipment Needed at Discharge walker, rolling   Physical Therapy Time and Intention   Timed Code Treatment Minutes 24   Total  Session Time (sum of timed and untimed services) 34     [facility]                                                                                   OUTPATIENT PHYSICAL THERAPY    PLAN OF TREATMENT FOR OUTPATIENT REHABILITATION   Patient's Last Name, First Name, VALENTEROBYN BunnAna  DEANA YOB: 1960   Provider's Name   [facility]   Medical Record No.  [id number]     Onset Date: 05/09/25 Start of Care Date: 05/10/25     Medical Diagnosis:  chest pain               PT Diagnosis:  impaired functional mobility, gait abnormality Certification Dates:  From: 05/10/25  To: 05/17/25       See note for plan of treatment, functional goals, and certification details.    I CERTIFY THE NEED FOR THESE SERVICES FURNISHED UNDER        THIS PLAN OF TREATMENT AND WHILE UNDER MY CARE (Physician co-signature of this document indicates review and certification of the therapy plan).

## 2025-05-10 NOTE — PLAN OF CARE
Physical Therapy Discharge Summary    Reason for therapy discharge:    Discharged to home.    Progress towards therapy goal(s). See goals on Care Plan in [facility] electronic health record for goal details.  Goals not met.  Barriers to achieving goals:   discharge on same date as initial evaluation.    Therapy recommendation(s):    Continued therapy is recommended.  Rationale/Recommendations:  outpatient PT as wanted.

## 2025-05-10 NOTE — MEDICATION SCRIBE - ADMISSION MEDICATION HISTORY
Medication Scribe Admission Medication History    Admission medication history is complete. The information provided in this note is only as accurate as the sources available at the time of the update.    Information Source(s): Patient via in-person    Pertinent Information: Patient provided medication list from [email or web address] that was printed today and claims that she follows the medication list.  Patient states that she stop taking Amlodipine believing that it is the cause for her to be here in ED. The last time patient took Amlodipine is today morning.  Patient states that she took all of her medication in the morning despite some medications are BID    Changes made to PTA medication list:  Added: Acetaminophen, Atorvastatin, Vitamin B complex, Calcium Carbonate, Novolog, Nicotine patch, Nicotine Lozenge, Probiotic, Psyllium (per patient)  Deleted: Garlic, Methocarbamol, Thiamine, Vitamin D 3(per patient)  Changed: None    Allergies reviewed with patient and updates made in EHR: yes    Medication History Completed By: Daniella Zamora 5/10/2025 12:05 AM    PTA Med List   Medication Sig Last Dose/Taking    acetaminophen (TYLENOL) 500 MG tablet Take 500-1,000 mg by mouth every 6 hours as needed for mild pain. Taking As Needed    amLODIPine (NORVASC) 10 MG tablet Take 0.5 tablets by mouth 2 times daily. 5/9/2025 Morning    atorvastatin (LIPITOR) 20 MG tablet Take 20 mg by mouth daily. 5/9/2025 Morning    B Complex CAPS Take 1 capsule by mouth daily. 5/9/2025 Morning    Calcium Carbonate-Vit D-Min (CALCIUM 600+D PLUS MINERALS) 600-400 MG-UNIT TABS Take 1 tablet by mouth daily. Take 1 Tablet by mouth two times a day. as a calcium and vitamin D supplement 5/9/2025 Morning    cinnamon 500 MG CAPS Take 1 capsule by mouth daily 5/9/2025 Morning    insulin glargine (BASAGLAR KWIKPEN) 100 UNIT/ML pen Inject 6 Units subcutaneously every morning. 5/9/2025 Morning    lisinopril (ZESTRIL) 10 MG tablet Take 1 tablet (10 mg)  by mouth daily for 30 days 5/9/2025 Morning    multivitamin, therapeutic (THERA-VIT) TABS tablet Take 1 tablet by mouth daily 5/9/2025 Morning    nicotine (COMMIT) 2 MG lozenge Place 2 mg inside cheek as needed for nicotine withdrawal symptoms. Slowly dissolve 1 lozenge every 1 -2 hrs (at least 9 daily) for 6 wks then every 2-4 hrs for 3 wks then 2-8 hrs for 3 wks Taking As Needed    nicotine (NICODERM CQ) 14 MG/24HR 24 hr patch Place 1 patch onto the skin every 24 hours. Past Week    NOVOLOG FLEXPEN 100 UNIT/ML soln Inject 3 Units subcutaneously 3 times daily (with meals). Inject 4 Units subcutaneously three times daily before meals. 5/9/2025 Evening    PROBIOTIC PRODUCT PO Take 1 tablet by mouth daily. 5/9/2025 Morning    psyllium (METAMUCIL/KONSYL) capsule Take 2 capsules by mouth every morning. 5/9/2025 Morning    psyllium (METAMUCIL/KONSYL) capsule Take 3 capsules by mouth every evening. 5/8/2025 Evening

## 2025-05-10 NOTE — DISCHARGE SUMMARY
"[facility]  Hospitalist Discharge Summary      Date of Admission:  5/9/2025  Date of Discharge:  5/10/2025  Discharging Provider: Joanie Brice MD  Discharge Service: Hospitalist Service    Discharge Diagnoses   Atypical Chest Pain   Hypertensive Urgency     Clinically Significant Risk Factors     # Overweight: Estimated body mass index is 28.08 kg/m  as calculated from the following:    Height as of this encounter: 1.575 m (5' 2\").    Weight as of this encounter: 69.6 kg (153 lb 8 oz).       Follow-ups Needed After Discharge   Follow-up Appointments       Follow Up      Follow up with Nephrology, within 14 days. for hospital follow- up. The following labs/tests are recommended: renal function.        Hospital Follow-up with Existing Primary Care Provider (PCP)          Schedule Primary Care visit within: 14 Days   Recommended labs and Imaging (to be ordered by Primary Care Provider): Renal function               Unresulted Labs Ordered in the Past 30 Days of this Admission       No orders found for last 31 day(s).            Discharge Disposition   Discharged to home  Condition at discharge: Stable    Hospital Course   64-year-old female with PMH of DM2, CKD, HTN who presented with chest pain and elevated blood pressure.  Patient recently started on amlodipine and believes her symptoms associated with medication.  Troponin negative x 2.  EKG with nonischemic ST changes.  Echo with no wall motion abnormalities and normal LV function.  Patient received IV hydralazine for blood pressure and antihypertensives reconciled except for lisinopril due to VIKTOR.  Renal ultrasound negative for acute process.  Chest x-ray negative for pneumothorax and pneumonia and CT PE negative for pulmonary embolism.  Patient also complained of right hip pain and x-ray of right hip and pelvis showed severe end-stage right hip joint degeneration.  Patient received pain medication for right hip pain.  She was seen by " OT.  Today patient seen and chest pain resolved.  Patient stable for discharge home with renal function and hypertensive follow-up at PCP and nephrology.     Consultations This Hospital Stay   PHYSICAL THERAPY ADULT IP CONSULT  OCCUPATIONAL THERAPY ADULT IP CONSULT    Code Status   Full Code    Time Spent on this Encounter   I, Joanie Brice MD, personally saw the patient today and spent greater than 30 minutes discharging this patient.       Joanie Brice MD  [facility] EXTENDED RECOVERY AND SHORT STAY  [address] [place] [id number]  Phone: [phone]  Fax: [phone]  ______________________________________________________________________    Physical Exam   Vital Signs: Temp: 98.4  F (36.9  C) Temp src: Oral BP: (!) 154/80 Pulse: 80   Resp: 18 SpO2: 99 % O2 Device: None (Room air)    Weight: 153 lbs 8 oz  Constitutional: awake, alert, cooperative, no apparent distress, and appears stated age  Eyes: pupils equal, round and reactive to light and sclera clear  ENT: atraumatic, oral pharynx with moist mucus membranes  Respiratory: No increased work of breathing, good air exchange, clear to auscultation bilaterally, no crackles or wheezing  Cardiovascular: regular rate and rhythm and normal S1 and S2  GI: normal bowel sounds, soft, non-distended, and non-tender  Musculoskeletal: no lower extremity pitting edema present  full range of motion noted       Primary Care Physician   MARCUS MURPHY    Discharge Orders      Reason for your hospital stay    Atypical chest pain associated with hypertensive urgency. CT chest was negative for a pulmonary embolism. Labs and EKG don't indicate ischemic changes. You were given IV antihypertensives and your chest pain resolved. Your renal function was abnormal and renal ultrasound done that was negative for an acute process. You are stable for discharge home with PCP and nephrology follow up.     Activity    Your activity upon discharge:  activity as tolerated     Follow Up    Follow up with Nephrology, within 14 days. for hospital follow- up. The following labs/tests are recommended: renal function.     Diet    Follow this diet upon discharge: Current Diet:Orders Placed This Encounter      Consistent Carbohydrate Diet Moderate Consistent Carb (60 g CHO per Meal) Diet     Hospital Follow-up with Existing Primary Care Provider (PCP)            Significant Results and Procedures   Most Recent 3 CBC's:  Recent Labs   Lab Test 05/10/25  0542 05/09/25 1940 11/27/23  0940   WBC 6.0 6.5 5.9   HGB 12.2 12.3 11.1*   MCV 93 92 92    204 206     Most Recent 3 BMP's:  Recent Labs   Lab Test 05/10/25  1316 05/10/25  1125 05/10/25  0729 05/10/25  0542 05/10/25  0201 05/09/25 1940 11/27/23  0940   NA  --   --   --  134*  --  132* 139   POTASSIUM  --   --   --  3.8  --  4.1 4.0   CHLORIDE  --   --   --  102  --  99 102   CO2  --   --   --  25  --  23 27   BUN  --   --   --  21.4  --  22.3 18.6   CR  --   --   --  1.91*  --  1.79* 1.84*   ANIONGAP  --   --   --  7  --  10 10   HONG  --   --   --  8.8  --  9.2 9.6   GLC 98 194* 136* 155*   < > 139* 221*    < > = values in this interval not displayed.   ,   Results for orders placed or performed during the hospital encounter of 05/09/25   XR Chest Port 1 View    Narrative    EXAM: XR CHEST PORT 1 VIEW  LOCATION: [facility]  DATE: 5/9/2025    INDICATION: chest pain  COMPARISON: 2/22/2023      Impression    IMPRESSION: Negative chest.   CT Chest Pulmonary Embolism w Contrast    Narrative    EXAM: CT CHEST PULMONARY EMBOLISM W CONTRAST  LOCATION: [facility]  DATE: 5/9/2025    INDICATION: chest pain elevated D Dimer  COMPARISON: Portable chest radiograph 05/09/2025 CT chest/abdomen/pelvis 06/12/2020.  TECHNIQUE: CT chest pulmonary angiogram during arterial phase injection of IV contrast. Multiplanar reformats and MIP reconstructions were performed. Dose reduction  techniques were used.   CONTRAST: 60mL ISOVUE 370    FINDINGS:  ANGIOGRAM CHEST: Pulmonary arteries are normal caliber and negative for pulmonary emboli. Thoracic aorta is negative for dissection. Atherosclerotic calcifications of the aortic arch and descending thoracic aorta. No CT evidence of right heart strain.    LUNGS AND PLEURA: Normal.    MEDIASTINUM/AXILLAE: Normal.    CORONARY ARTERY CALCIFICATION: Mild.    UPPER ABDOMEN: Atherosclerotic calcifications of the partially visualized upper abdominal aorta. Splenic calcified granuloma.    MUSCULOSKELETAL: Degenerative changes of the thoracic spine. No acute osseous abnormality.      Impression    IMPRESSION:  1.  No pulmonary artery embolus or other acute process within the chest.   US Renal Complete Non-Vascular    Narrative    EXAM: US RENAL COMPLETE NON-VASCULAR  LOCATION: [facility]  DATE: 5/10/2025    INDICATION: ? renal lesion  COMPARISON: CT 2020  TECHNIQUE: Routine Bilateral Renal and Bladder Ultrasound.    FINDINGS:    RIGHT KIDNEY: 9.1 cm. Normal without hydronephrosis or suspicious masses. 1.0 cm benign cyst of the upper pole requiring no dedicated followup.    LEFT KIDNEY: 9.5 cm. Normal without hydronephrosis or masses.     BLADDER: Urinary bladder is partially decompressed, otherwise unremarkable.      Impression    IMPRESSION:  1.  1.0 cm benign cyst of the right upper pole requiring no dedicated follow-up.  2.  No suspicious renal lesion or hydronephrosis.   Echocardiogram Complete     Value    LVEF  60-65%    Narrative    [id number]  [id number]  [id number]  [id number]^MILLIE^TSERING^[place] [address]     Name: NATACHA PEÑA  MRN: [id number]  : 1960  Study Date: 05/10/2025 09:15 AM  Age: 64 yrs  Gender: Female  Patient Location: [facility]  Reason For Study: Chest Pain  Ordering Physician: TSERING PINTO  Performed By:      BSA: 1.7 m2  Height: 62 in  Weight: 153  lb  HR: 74  BP: 124/69 mmHg  ______________________________________________________________________________  Procedure  Echocardiogram with two-dimensional, color and spectral Doppler. Adequate  quality two-dimensional was performed and interpreted. There is no comparison  study available.  ______________________________________________________________________________  Interpretation Summary     The left ventricle is normal in size. There is mild concentric left  ventricular hypertrophy.  Left ventricular systolic function is normal. The visual ejection fraction is  60-65%. No regional wall motion abnormalities noted.     The right ventricle is normal in size and function.  Normal left atrial size. Right atrial size is normal.  There is mild (1+) tricuspid regurgitation.  Right ventricle systolic pressure estimate normal  IVC diameter <2.1 cm collapsing >50% with sniff suggests a normal RA pressure  of 3 mmHg.  There is no comparison study available.  ______________________________________________________________________________  Left Ventricle  The left ventricle is normal in size. There is mild concentric left  ventricular hypertrophy. Left ventricular systolic function is normal. The  visual ejection fraction is 60-65%. Left ventricular diastolic function is  indeterminate. No regional wall motion abnormalities noted.     Right Ventricle  The right ventricle is normal in size and function.     Atria  Normal left atrial size. Right atrial size is normal.     Mitral Valve  Mitral valve leaflets appear normal. There is trace mitral regurgitation.  There is no mitral valve stenosis.     Tricuspid Valve  Tricuspid valve leaflets appear normal. There is mild (1+) tricuspid  regurgitation. Right ventricle systolic pressure estimate normal.     Aortic Valve  The aortic valve is trileaflet. Mild aortic valve calcification is present. No  aortic regurgitation is present. No aortic stenosis is present.     Pulmonic  Valve  The pulmonic valve is not well visualized. There is trace pulmonic valvular  regurgitation.     Vessels  The aorta root is normal. IVC diameter <2.1 cm collapsing >50% with sniff  suggests a normal RA pressure of 3 mmHg.     Pericardium  There is no pericardial effusion.     Rhythm  Sinus rhythm was noted.  ______________________________________________________________________________  MMode/2D Measurements & Calculations     IVSd: 1.4 cm  LVIDd: 4.2 cm  LVIDs: 2.8 cm  LVPWd: 1.2 cm  FS: 34.0 %  LV mass(C)d: 196.6 grams  LV mass(C)dI: 115.2 grams/m2  Ao root diam: 3.2 cm  asc Aorta Diam: 3.3 cm  LVOT diam: 2.0 cm  LVOT area: 3.1 cm2  Ao root diam index Ht(cm/m): 2.0  Ao root diam index BSA (cm/m2): 1.9  Asc Ao diam index BSA (cm/m2): 1.9  Asc Ao diam index Ht(cm/m): 2.1  EF Biplane: 55.1 %     LA Volume Indexed (AL/bp): 25.0 ml/m2  RWT: 0.55  TAPSE: 2.4 cm     Time Measurements  MM HR: 74.0 BPM     Doppler Measurements & Calculations  MV E max aditya: 69.7 cm/sec  MV A max aditya: 70.6 cm/sec  MV E/A: 0.99  MV max P.5 mmHg  MV mean P.0 mmHg  MV V2 VTI: 20.6 cm  MVA(VTI): 2.9 cm2  MV dec slope: 352.0 cm/sec2  MV dec time: 0.20 sec  Ao V2 max: 145.5 cm/sec  Ao max P.0 mmHg  Ao V2 mean: 99.1 cm/sec  Ao mean P.0 mmHg  Ao V2 VTI: 29.0 cm  ALFREDITO(I,D): 2.0 cm2  ALFREDITO(V,D): 1.9 cm2  LV V1 max PG: 3.1 mmHg  LV V1 max: 87.8 cm/sec  LV V1 VTI: 18.9 cm  SV(LVOT): 59.4 ml  SI(LVOT): 34.8 ml/m2     PA V2 max: 84.8 cm/sec  PA max P.9 mmHg  PA acc time: 0.08 sec  AV Aditya Ratio (DI): 0.60  ALFREDITO Index (cm2/m2): 1.2  E/E': 10.4  E/E' av.4  Lateral E/e': 8.4  Medial E/e': 10.3  Peak E' Aditya: 6.7 cm/sec  RV S Aditya: 11.4 cm/sec     ______________________________________________________________________________  Report approved by: David Ryan MD on 05/10/2025 12:23 PM             Discharge Medications   Current Discharge Medication List        CONTINUE these medications which have CHANGED    Details   lisinopril  (ZESTRIL) 10 MG tablet Take 1 tablet (10 mg) by mouth daily.  Qty: 30 tablet, Refills: 0    Associated Diagnoses: Essential hypertension           CONTINUE these medications which have NOT CHANGED    Details   acetaminophen (TYLENOL) 500 MG tablet Take 500-1,000 mg by mouth every 6 hours as needed for mild pain.      amLODIPine (NORVASC) 10 MG tablet Take 0.5 tablets by mouth 2 times daily.      atorvastatin (LIPITOR) 20 MG tablet Take 20 mg by mouth daily.      B Complex CAPS Take 1 capsule by mouth daily.      Calcium Carbonate-Vit D-Min (CALCIUM 600+D PLUS MINERALS) 600-400 MG-UNIT TABS Take 1 tablet by mouth daily. Take 1 Tablet by mouth two times a day. as a calcium and vitamin D supplement      cinnamon 500 MG CAPS Take 1 capsule by mouth daily      insulin glargine (BASAGLAR KWIKPEN) 100 UNIT/ML pen Inject 6 Units subcutaneously every morning.      multivitamin, therapeutic (THERA-VIT) TABS tablet Take 1 tablet by mouth daily      nicotine (COMMIT) 2 MG lozenge Place 2 mg inside cheek as needed for nicotine withdrawal symptoms. Slowly dissolve 1 lozenge every 1 -2 hrs (at least 9 daily) for 6 wks then every 2-4 hrs for 3 wks then 2-8 hrs for 3 wks      nicotine (NICODERM CQ) 14 MG/24HR 24 hr patch Place 1 patch onto the skin every 24 hours.      NOVOLOG FLEXPEN 100 UNIT/ML soln Inject 3 Units subcutaneously 3 times daily (with meals). Inject 4 Units subcutaneously three times daily before meals.      PROBIOTIC PRODUCT PO Take 1 tablet by mouth daily.      !! psyllium (METAMUCIL/KONSYL) capsule Take 2 capsules by mouth every morning.      !! psyllium (METAMUCIL/KONSYL) capsule Take 3 capsules by mouth every evening.       !! - Potential duplicate medications found. Please discuss with provider.        Allergies   No Known Allergies

## 2025-05-10 NOTE — DISCHARGE INSTRUCTIONS
May take one dose (10 mg) of lisinopril if blood pressure increases after going home today (05/10/25). Afterwards, continue with one tablet daily. Thank you!

## 2025-05-10 NOTE — PROGRESS NOTES
Patient came from ED at around 0030 via wheel chair. Walked from wheel chair to bed with SBA. Admitted due to chest pain. Denies chest pain. Has chronic R hip pain due to arthritis and is due for surgery. Prn dilaudid given x1 for pain. BP elevated, prn hydralazine given x1 with noted improvement.   Renal ultra sound done at bedside.   Patient's grand daughter at bedside. Personal belongings include purse with credit cards, earrings, glasses, clothing, insulin pen, shoes. Patient's granddaughter is keeping belongings with her at bedside. Oriented to unit routine and call light use.

## 2025-05-10 NOTE — ED NOTES
"[facility] ED Handoff Report    ED Chief Complaint: Chest pain    ED Diagnosis:  (R07.9) Chest pain, unspecified type  Comment: Observation  Plan: Observation       PMH:  No past medical history on file.     Code Status:  Full Code     Falls Risk: No Band: Not applicable    Current Living Situation/Residence: lives in a house     Elimination Status: Continent: Yes     Activity Level: SBA    Patients Preferred Language:  English     Needed: No    Vital Signs:  BP (!) 198/76   Pulse 77   Temp 98.2  F (36.8  C) (Temporal)   Resp 25   Ht 1.575 m (5' 2\")   Wt 71.7 kg (158 lb)   SpO2 99%   BMI 28.90 kg/m       Cardiac Rhythm: NSR    Pain Score: 1/10    Is the Patient Confused:  No    Last Food or Drink: 05/09/25    Focused Assessment:  Cardiac    Tests Performed: Done: Labs and Imaging    Treatments Provided:  Labs, imaging, medications    Family Dynamics/Concerns: No    Family Updated On Visitor Policy: Yes    Plan of Care Communicated to Family: Yes    Who Was Updated about Plan of Care: Grand daughter    Belongings Checklist Done and Signed by Patient: Yes    Medications sent with patient: None    Covid: asymptomatic     Additional Information: Atypical chest pain  - PE is ruled out  -Trend troponin to rule out ACS  -Obtain echocardiogram to evaluate for wall motion abnormality, ejection fraction and pulmonary pressures  -Consult cardiology with major abnormalities  -May benefit from outpatient stress test  -Patient will likely need GI evaluation for outpatient endoscopy as well  -  #Renal insufficiency/CKD stage III  -With uremic symptoms of nausea, tremors and dizziness, feeling faint  - Avoid nephrotoxic drugs  - May need to consider renal ultrasound to rule out obstructive uropathy  - CT from 2021 did show bilateral perinephric stranding  - No concerns for pyelonephritis at this time  - MRI lumbosacral spine from 11/2023 showed concern for possible renal lesion  - Nonemergent renal " ultrasound was recommended.  -Will order renal ultrasound.-     #Type II DM  - Accu-Cheks, sliding scale insulin, blood sugar goal [id number]  - Medium sliding scale  - Diabetic diet when appropriate     #GERD.    -PPI twice daily  -Consider GI input if ACS rule out  - Atypical chest pain likely GI source     #Hypertension  - Resume amlodipine  - If ACS, will add low-dose beta-blocker     #Neck pain  - May benefit from MRI of the cervical spine in the near future  - Could also explain the dizziness, balance problems and headache  - PT OT evaluating  - Noted lumbar degeneration  #Rest of patient's medical problems management remains unchanged       Al Gonsalez, RN 5/9/2025 11:58 PM

## 2025-05-10 NOTE — PROGRESS NOTES
"   05/10/25 1020   Appointment Info   Signing Clinician's Name / Credentials (OT) Elizabeth Colin, OTR/L   Living Environment   People in Home grandchild(carolyn)   Current Living Arrangements house   Home Accessibility stairs to enter home   Number of Stairs, Main Entrance 7   Self-Care   Equipment Currently Used at Home walker, rolling;cane, straight   Fall history within last six months no   Activity/Exercise/Self-Care Comment Independent ADLs.   Instrumental Activities of Daily Living (IADL)   IADL Comments Granddaughter assists with IADLs   General Information   Onset of Illness/Injury or Date of Surgery 05/09/25   Referring Physician Paige Rangel MD   Patient/Family Therapy Goal Statement (OT) none stated   Additional Occupational Profile Info/Pertinent History of Current Problem Per chart review, pt \"is a 64 year old female with a medical history of type 2 DM, hypertension, hyperlipidemia, CKD stage III, nicotine use and other medical comorbdbities who is being admitted for atypical chest pain and renal failure with possible uremic symptoms.\"   Existing Precautions/Restrictions no known precautions/restrictions   Cognitive Status Examination   Orientation Status orientation to person, place and time  (A&Ox4)   Pain Assessment   Patient Currently in Pain Yes, see Vital Sign flowsheet  (chronic R hip pain)   Range of Motion Comprehensive   General Range of Motion bilateral upper extremity ROM WFL   Strength Comprehensive (MMT)   Comment, General Manual Muscle Testing (MMT) Assessment Slight generalized weakness noted functionally   Bed Mobility   Bed Mobility supine-sit;sit-supine   Supine-Sit Fannin (Bed Mobility) independent   Sit-Supine Fannin (Bed Mobility) independent   Transfers   Transfers sit-stand transfer   Sit-Stand Transfer   Sit-Stand Fannin (Transfers) contact guard   Assistive Device (Sit-Stand Transfers) walker, front-wheeled   Balance   Balance Comments SBA-CGA functional mobility " using FWW, slightly unsteady d/t chronic R hip pain with no LOB observed   Activities of Daily Living   BADL Assessment/Intervention toileting;grooming   Grooming Assessment/Training   Douglas Level (Grooming) supervision   Position (Grooming) sink side;unsupported standing   Toileting   Douglas Level (Toileting) contact guard assist;supervision;verbal cues   Clinical Impression   Criteria for Skilled Therapeutic Interventions Met (OT) Evaluation only;No problems identified which require skilled intervention;Current level of function same as previous level of function   Clinical Decision Making Complexity (OT) problem focused assessment/low complexity   OT Total Evaluation Time   OT Eval, Low Complexity Minutes (24320) 10   OT Discharge Planning   OT Plan discharge OT   OT Discharge Recommendation (DC Rec) home with assist   OT Rationale for DC Rec Granddaughter able to assist as needed, pt performing ADLs at/close to baseline.   OT Brief overview of current status SBA-CGA   OT Total Distance Amb During Session (feet) 100   Total Session Time   Total Session Time (sum of timed and untimed services) 10

## 2025-05-10 NOTE — H&P
[facility]    History and Physical - Hospitalist Service       Date of Admission:  5/9/2025    Assessment & Plan   Ana Bunn is a 64 year old female with a medical history of type 2 DM, hypertension, hyperlipidemia, CKD stage III, nicotine use and other medical comorbdbities who is being admitted for atypical chest pain and renal failure with possible uremic symptoms.    Patient Active Problem List   Diagnosis    Age-related nuclear cataract of both eyes    Chest pain    Anemia    Closed fracture of lower end of left radius with routine healing    E. coli UTI    High triglycerides    HTN (hypertension)    Microalbuminuria    Stage 3b chronic kidney disease (H)    Type 2 diabetes mellitus with microalbuminuria, with long-term current use of insulin (H)    Vitamin D insufficiency    Dizziness    Dyslipidemia    Esophageal reflux    Gait abnormality    History of abnormal cervical Pap smear    Imbalance    Mild traumatic brain injury (H)    Neck pain    Osteopenia    Right hip pain    Septic arthritis of lumbar spine    Chest pain, unspecified type      # Atypical chest pain  - PE is ruled out  -Trend troponin to rule out ACS  -Obtain echocardiogram to evaluate for wall motion abnormality, ejection fraction and pulmonary pressures  -Consult cardiology with major abnormalities  -May benefit from outpatient stress test  -Patient will likely need GI evaluation for outpatient endoscopy as well  -  #Renal insufficiency/CKD stage III  -With uremic symptoms of nausea, tremors and dizziness, feeling faint  - Avoid nephrotoxic drugs  - May need to consider renal ultrasound to rule out obstructive uropathy  - CT from 2021 did show bilateral perinephric stranding  - No concerns for pyelonephritis at this time  - MRI lumbosacral spine from 11/2023 showed concern for possible renal lesion  - Nonemergent renal ultrasound was recommended.  -Will order renal ultrasound.-    #Type II DM  - Heavenu-Afshin  "sliding scale insulin, blood sugar goal [id number]  - Medium sliding scale  - Diabetic diet when appropriate    #GERD.    -PPI twice daily  -Consider GI input if ACS rule out  - Atypical chest pain likely GI source    #Hypertension  - Resume amlodipine  - If ACS, will add low-dose beta-blocker    #Neck pain  - May benefit from MRI of the cervical spine in the near future  - Could also explain the dizziness, balance problems and headache  - PT OT evaluating  - Noted lumbar degeneration      Right hip pain  - likely contributing to elevated blood pressure  -pain control PRN  -low dose dilaudid ordered    #Rest of patient's medical problems management remains unchanged    Diet:  Cardiac diet when appropriate  DVT Prophylaxis: Pneumatic Compression Devices  Rg Catheter: Not present  Lines: PRESENT             Cardiac Monitoring: None  Code Status:  Full code    Clinically Significant Risk Factors Present on Admission         # Hyponatremia: Lowest Na = 132 mmol/L in last 2 days, will monitor as appropriate           # Hypertension: Noted on problem list           # Overweight: Estimated body mass index is 28.9 kg/m  as calculated from the following:    Height as of this encounter: 1.575 m (5' 2\").    Weight as of this encounter: 71.7 kg (158 lb).              Disposition Plan     Medically Ready for Discharge: 1-2days           Paige Rangel MD  Hospitalist Service  [facility]  Securely message with [employer] (more info)  Text page via [employer] Paging/Directory     ______________________________________________________________________    Chief Complaint   Atypical chest pain        History of Present Illness   Ana Bunn is a 64 year old female with a medical history of type 2 DM, hypertension, hyperlipidemia, CKD stage III, nicotine use and other medical comorbdbities who is being admitted for atypical chest pain and renal failure with possible uremic symptoms.    Per history, presented to " the ER for evaluation of chest pain and an ongoing headache since 10 AM in the morning..  Patient described the pain as an ache which was intermittent.  She reported associated dizziness, feeling faint and weakness as well as experiencing generalized jitteriness and a stiff neck.  Patient reported some nausea but no vomiting.    She reported that her symptoms improved in the afternoon but worsened after taking a nap..  It is noted that patient has a history of UTI with sepsis in the past and has had recurrent asymptomatic UTIs.    Review of systems was otherwise negative for fevers, cough, sore throat, shortness of breath or abdominal pain.     she reportedly started taking amlodipine last night in place of lisinopril which was prescribed by her primary care physician.  She denied radiation of the pain but endorses associated stiff shoulders and generalized weakness.  She also reported feeling tremulous with a headache.    Preliminary workup done showed a sodium of 132, potassium 4.1, creatinine of 1.79 which is close to her baseline of 1.7 and forwarded to [facility] depression you are trying to reach is not available.  Troponin was negative x 2.  EKG showed sinus rhythm with bundle branch block.    CT chest PE done showed:    Was noted to have an antalgic gait when seen in her room.  She complains of ongoing right hip pain and reports she has been told she needs surgery.  Her blood pressure was in the 200s when seen.  She reports her right hip prevents her from sleeping well at night.      Patient is a full code for now    Past Medical History    Diabetes mellitus due to underlying condition with diabetic chronic kidney disease 04/02/2025     Mixed conductive and sensorineural hearing loss, unilateral, left ear, with unrestricted hearing on the contralateral side 01/22/2025     Mixed conductive and sensorineural hearing loss, unilateral, left ear with restricted hearing on the contralateral side 01/22/2025      Secondary hyperparathyroidism of renal origin 01/15/2025     Type 2 diabetes mellitus without complications 01/15/2025     Nicotine dependence, cigarettes, uncomplicated 01/15/2025     Primary osteoarthritis of right hip 03/27/2024     Cystitis 10/31/2023     Long term current use of insulin 10/31/2023     Menopausal and postmenopausal disorder 10/31/2023     Proteinuria 10/31/2023     Septic arthritis of lumbar spine 10/21/2023     History of abnormal cervical Pap smear 11/07/2022    Overview (11/07/2022):    Formatting of this note might be different from the original.  From visit on 10/17/22: History of abnormal pap tests? Yes; patient agreeable to Pap smear today  2002 ASCUS; PAP; COLP NEG  2004 ASCUS; HPV neg  2005 NILM  2008 NILM  2011 ASCUS; HPV neg  2018 NILM (CE)  2022 NILM; HPV neg 61 y.o.  Plan: Pap and co-testing (pap and HPV) 10/2027     At risk alcohol consumption 10/19/2022     Osteopenia 07/18/2022     Impaired instrumental activities of daily living (IADL) 06/13/2022     Mild traumatic brain injury 06/13/2022     Imbalance 05/17/2022     Dizziness 05/17/2022     Neck pain 05/17/2022     Closed fracture of lower end of left radius with routine healing 02/07/2022     Anemia 01/24/2022     Hypertension 01/24/2022     Stage 3b chronic kidney disease 01/24/2022     Microalbuminuria 01/24/2022     Chest pain 06/08/2020     S/P ORIF (open reduction internal fixation) fracture 03/12/2020     Age-related nuclear cataract of both eyes 01/07/2020     High triglycerides 10/08/2019     HLD (hyperlipidemia) 06/04/2015     Overview (10/31/2023):    Formatting of this note might be different from the original.  Formatting of this note might be different from the original. Last lipid in 2013, pending ordered repeat ordered in Feb, 2015.         Past Surgical History   Past Surgical History:   Procedure Laterality Date    IR DISC ASPIRATION INJECTION  10/22/2023   SurgeryDateSite/LateralityCommentsUterine Ablation  due to Menorrhagia       CLOSURE LACRIMAL PUNCTUM; PLUG EACH       ECTOPIC PREGNANCY SURGERY       left distal radius         Prior to Admission Medications   Prior to Admission Medications   Prescriptions Last Dose Informant Patient Reported? Taking?   Vitamin D3 (VITAMIN D, CHOLECALCIFEROL,) 25 mcg (1000 units) tablet   Yes No   Sig: Take 1 tablet by mouth daily   amLODIPine (NORVASC) 5 MG tablet   No No   Sig: Take 1 tablet (5 mg) by mouth daily for 30 days   cinnamon 500 MG CAPS   Yes No   Sig: Take 1 capsule by mouth daily   garlic 150 MG TABS tablet   Yes No   Sig: Take 150 mg by mouth daily   insulin glargine (BASAGLAR KWIKPEN) 100 UNIT/ML pen   Yes No   Sig: Inject 12-13 Units Subcutaneous every evening   lidocaine (LIDODERM) 5 % patch   No No   Sig: Place 1 patch onto the skin every 24 hours To prevent lidocaine toxicity, patient should be patch free for 12 hrs daily.   lisinopril (ZESTRIL) 10 MG tablet   No No   Sig: Take 1 tablet (10 mg) by mouth daily for 30 days   methocarbamol (ROBAXIN) 500 MG tablet   No No   Sig: Take 1 tablet (500 mg) by mouth 4 times daily as needed for muscle spasms   multivitamin, therapeutic (THERA-VIT) TABS tablet   Yes No   Sig: Take 1 tablet by mouth daily   thiamine (B-1) 100 MG tablet   Yes No   Sig: Take 100 mg by mouth daily      Facility-Administered Medications: None        Review of Systems    The 10 point Review of Systems is negative other than noted in the HPI or here.     Social History   I have reviewed this patient's social history and updated it with pertinent information if needed.  Social History     Tobacco Use    Smoking status: Every Day     Current packs/day: 0.50     Types: Cigarettes    Smokeless tobacco: Never    Tobacco comments:     Seen IP by CTTS on 10/25, contemplating a quit attempt   Substance Use Topics    Alcohol use: Yes     Alcohol/week: 3.0 standard drinks of alcohol     Types: 3 Cans of beer per week     Comment: approx 3 beers/day          Family History   Family History  Medical History Relation Name Comments   Alcohol/Drug Abuse Birth Father       Hypertension Birth Father       Other Birth Father   Sudden death at age 75   Coronary Artery Disease Birth Mother   early onset,  at age 73   Diabetes, Type II Birth Mother       Diabetes, Type II Brother       Cancer, Breast Sister 1   diagnosed at age 46   Diabetes, Type II Sister 2       Diabetes, Type II Sister 3       Diabetes, Type II Sister 4       Hyperlipidemia Sister 5       Thyroid Disorder Sister 6       Cancer, Colon Negative Family History       Glaucoma Negative Family History       Macular Degeneration Negative Family History         Family History  Relation Name Status Comments   Birth Father        Birth Mother        Brother         Sister 1         Sister 2         Sister 3         Sister 4         Sister 5         Sister 6               Allergies   No Known Allergies     Physical Exam   Vital Signs: Temp: 98.2  F (36.8  C) Temp src: Temporal BP: (!) 166/77 Pulse: 82   Resp: 23 SpO2: 100 % O2 Device: None (Room air)    Weight: 158 lbs 0 oz      General Aox3, appropriate affect, NAD, on RA  HEENT  MMM, EOMI, PERRL  Chest Adeq E b/l, No wheezing  Heart RRR, No M/R/G  Abd- Soft, NT, BS+  - Deferred,   Extremity- Moving all extremities, No digital clubbing,   No edema  Neuro- Aox3, moving all extremities with decreased range of motion and right hip   Antalgic gait due to right hip pain  Skin  Has no tattoo, No skin rash     Medical Decision Making       85 MINUTES SPENT BY ME on the date of service doing chart review, history, exam, documentation & further activities per the note.      ------------------ MEDICAL DECISION MAKING ------------------------------------------------------------------------------------------------------  MANAGEMENT DISCUSSED with the following over the past 24 hours: patient and care team       Data   ------------------------- PAST 24  HR DATA REVIEWED -----------------------------------------------    I have personally reviewed the following data over the past 24 hrs:    6.5  \   12.3   / 204     132 (L) 99 22.3 /  139 (H)   4.1 23 1.79 (H) \     ALT: 15 AST: 14 AP: 80 TBILI: 0.3   ALB: 3.9 TOT PROTEIN: 6.4 LIPASE: 27     Trop: 9 BNP: N/A     Procal: 0.06 CRP: N/A Lactic Acid: 0.4 (L)       INR:  N/A PTT:  N/A   D-dimer:  0.54 (H) Fibrinogen:  N/A       Imaging results reviewed over the past 24 hrs:   Recent Results (from the past 24 hours)   XR Chest Port 1 View    Narrative    EXAM: XR CHEST PORT 1 VIEW  LOCATION: [facility]  DATE: 5/9/2025    INDICATION: chest pain  COMPARISON: 2/22/2023      Impression    IMPRESSION: Negative chest.

## 2025-05-10 NOTE — CARE PLAN
PRIMARY DIAGNOSIS: CHEST PAIN  OUTPATIENT/OBSERVATION GOALS TO BE MET BEFORE DISCHARGE:  1. Ruled out acute coronary syndrome (negative or stable Troponin):  Yes  2. Pain Status: Improved with use of alternative comfort measures i.e.: positioning  3. Appropriate provocative testing performed: Yes  - Stress Test Procedure: Echo  - Interpretation of cardiac rhythm per telemetry tech: NSR    4. Cleared by Consultants (if applicable):No  5. Return to near baseline physical activity: No  Discharge Planner Nurse   Safe discharge environment identified: Yes  Barriers to discharge: Yes       Entered by: Ora Merida RN 05/10/2025 9:43 AM     Please review provider order for any additional goals.   Nurse to notify provider when observation goals have been met and patient is ready for discharge.  Pt is A&Ox4, report 2/10 right hip pain, denies N/V/D, upright at bedside to eat. Tolerating PO intake and medications. Denies SOB and dizziness, daughter bedside

## 2025-05-10 NOTE — PLAN OF CARE
PRIMARY DIAGNOSIS: CHEST PAIN  OUTPATIENT/OBSERVATION GOALS TO BE MET BEFORE DISCHARGE:  1. Ruled out acute coronary syndrome (negative or stable Troponin):  Yes  2. Pain Status: Pain free.  3. Appropriate provocative testing performed: No  - Stress Test Procedure: Echo in AM.   - Interpretation of cardiac rhythm per telemetry tech: NSR     4. Cleared by Consultants (if applicable):N/A  5. Return to near baseline physical activity: Yes  Discharge Planner Nurse   Safe discharge environment identified: Yes  Barriers to discharge: Yes-- Echo pending.        Entered by: Sadia Steele RN 05/10/2025 2:50 AM     Please review provider order for any additional goals.   Nurse to notify provider when observation goals have been met and patient is ready for discharge.  Goal Outcome Evaluation:

## 2025-05-10 NOTE — PLAN OF CARE
PRIMARY DIAGNOSIS: CHEST PAIN  OUTPATIENT/OBSERVATION GOALS TO BE MET BEFORE DISCHARGE:  1. Ruled out acute coronary syndrome (negative or stable Troponin):  Yes  2. Pain Status: Pain free.  3. Appropriate provocative testing performed: N/A  - Stress Test Procedure: Echo  - Interpretation of cardiac rhythm per telemetry tech: NSR    4. Cleared by Consultants (if applicable):N/A  5. Return to near baseline physical activity: Yes  Discharge Planner Nurse   Safe discharge environment identified: Yes  Barriers to discharge: Yes-- Echo in AM.        Entered by: Sadia Steele RN 05/10/2025 6:05 AM     Please review provider order for any additional goals.   Nurse to notify provider when observation goals have been met and patient is ready for discharge.  Goal Outcome Evaluation:    Slept between cares. BP improved after x1 dose of prn hydralazine. Other vitals stable on room air. Denies chest pain. R hip pain managed with prn pain meds. Up to bedside commode independently. Plan for ECHO in the morning.

## 2025-05-10 NOTE — CARE PLAN
PRIMARY Concern: chest pain, KRISHNA  SAFETY RISK Concerns (fall risk, behaviors, etc.): fall risk due to pain      Isolation/Type: none  Tests/Procedures for NEXT shift: echo  Consults? (Pending/following, signed-off?) OT/PT/cards  Where is patient from? (Home, TCU, etc.): home  Other Important info for NEXT shift: daughter bedside  Anticipated DC date & active delays: tomorrow  _____________________________________________________________________________  SUMMARY NOTE:   Orientation/Cognitive: A&Ox4  Observation Goals (Met/ Not Met): not met  Mobility Level/Assist Equipment: A1, GB/W  Antibiotics & Plan (IV/po, length of tx left): none  Pain Management: 2/10 reported  Tele/VS/O2: VSS, RA  ABNL Lab/BG: , Cr 1.91  Diet: mod carb  Bowel/Bladder: continent  Skin Concerns: none  Drains/Devices: PIVSL

## 2025-05-10 NOTE — PLAN OF CARE
Notified MD at 0054 AM regarding new orders.  Pt's blood pressures are in the 190-200's systolic. Do you want to give anything? Thanks!    Spoke with: Dr. Rangel    Orders were obtained. Hydralazine ordered.

## 2025-05-10 NOTE — CARE PLAN
PRIMARY DIAGNOSIS: CHEST PAIN  OUTPATIENT/OBSERVATION GOALS TO BE MET BEFORE DISCHARGE:  1. Ruled out acute coronary syndrome (negative or stable Troponin):  Yes  2. Pain Status: Improved with use of alternative comfort measures i.e.: positioning  3. Appropriate provocative testing performed: Yes  - Stress Test Procedure: Echo  - Interpretation of cardiac rhythm per telemetry tech: NSR    4. Cleared by Consultants (if applicable):No  5. Return to near baseline physical activity: No  Discharge Planner Nurse   Safe discharge environment identified: Yes  Barriers to discharge: Yes       Entered by: Ora Merida RN 05/10/2025 9:46 AM     Please review provider order for any additional goals.   Nurse to notify provider when observation goals have been met and patient is ready for discharge.    Pt is A&Ox4, report minimal right hip pain, denies N/V/D, upright at bedside to eat. Tolerating PO intake and medications. Denies SOB and dizziness, daughter bedside

## 2025-05-10 NOTE — PLAN OF CARE
Goal Outcome Evaluation:      Plan of Care Reviewed With: patient    Overall Patient Progress: improvingOverall Patient Progress: improving     Pt is A&Ox4, denies N/V/D, SOB and dizziness. Mild hip pain reported, denies chest pain. Tolerating PO intake, /80, other VSS, RA. Ambulating with walker.

## 2025-05-10 NOTE — PLAN OF CARE
Patient discharged to home at 4:20 PM  via Private Car.  Accompanied by sister and staff.  Discharge instructions were reviewed with patient, opportunity offered to ask questions.    Prescriptions e-prescribed to pharmacy.  Access discontinued: Yes  Care plan and education discontinued: Yes  Home meds retrieved from pharmacy: N/A  Belongings were sent home with patient/family: purse with credit cards, earrings, glasses, clothing.

## 2025-05-10 NOTE — ED PROVIDER NOTES
"EMERGENCY DEPARTMENT NOTE     Name: Ana Bunn    Age/Sex: 64 year old female   MRN: [id number]   Evaluation Date & Time:  5/9/2025  7:30 PM    PCP:    Laura Tan   ED Provider: Jay Ho D.O.       CHIEF COMPLAINT    Chest Pain     HISTORY OF PRESENT ILLNESS BRIEF   Ana Bunn is a 64 year old year old female with a relevant past history of HTN, chronic kidney disease, and DMT2 who presents to the ED  for evaluation of chest pain and headache.      DIAGNOSIS & DISPOSITION/MEDICAL DECISION MAKING     1. Chest pain, unspecified type    2. Essential hypertension        EMERGENCY DEPARTMENT COURSE   7:51 PM I met with the patient to gather history and to perform my initial exam.  We discussed treatment options and the plan for care while in the Emergency Department.  8:36 PM I spoke with the hospitalist, Dr. Rangel. We discussed the patient's case and they agree to admit the patient.      Triage vital signs:BP (!) 165/87   Pulse 79   Temp 98.2  F (36.8  C) (Temporal)   Resp 16   Ht 1.575 m (5' 2\")   Wt 71.7 kg (158 lb)   SpO2 100%   BMI 28.90 kg/m     Emergent causes of chest pain considered included but not limited to ACS, myocarditis/pericarditis, pulmonary embolism, thoracic aortic dissection, pneumothorax.    MDM: Patient on exam and normal cardiopulmonary exam.  Abdomen was nontender.  Neurologically intact  Diagnostic studies:  Chest x-ray obtained and interpreted by myself: No infiltrate or acute process  CTA of the chest: No acute process including evidence of pulmonary embolism, thoracic aortic dissection, pericardial effusion or infiltrate  Laboratory studies obtained interpreted by myself:  EKG: Sinus rhythm nonischemic, initial troponin 10 on repeat 9  CBC and comprehensive metabolic profile within normal limits except for sodium 134, creatinine 1.9 with GFR 29 stable chronic kidney disease, glucose 155 normal CO2 and anion gap   Patient received IV Pepcid and oral Maalox " "with resolution of chest pain.  Headache resolved with oral acetaminophen.  Heart score is 4.  Risk of undiagnosed conditions including ACS were discussed.DIscussed risks including myocardial infarction or arrhythmia that at worst could be life-threatening.  Discussed options with the patient including admission to the hospital for further evaluation versus outpatient follow-up with the cardiology rapid access clinic.  After discussion patient is most comfortable with admission for monitoring and further evaluation  Discharge Vital Signs: BP (!) 143/67 (BP Location: Left arm)   Pulse 80   Temp 97.7  F (36.5  C) (Oral)   Resp 17   Ht 1.575 m (5' 2\")   Wt 69.6 kg (153 lb 8 oz)   SpO2 98%   BMI 28.08 kg/m     PROCEDURES:   None  Diagnostic studies:                             CT Chest Pulmonary Embolism w Contrast   Final Result   IMPRESSION:   1.  No pulmonary artery embolus or other acute process within the chest.      XR Chest Port 1 View   Final Result   IMPRESSION: Negative chest.        Labs Ordered and Resulted from Time of ED Arrival to Time of ED Departure   BASIC METABOLIC PANEL - Abnormal       Result Value    Sodium 132 (*)     Potassium 4.1      Chloride 99      Carbon Dioxide (CO2) 23      Anion Gap 10      Urea Nitrogen 22.3      Creatinine 1.79 (*)     GFR Estimate 31 (*)     Calcium 9.2      Glucose 139 (*)    LACTIC ACID WHOLE BLOOD WITH 1X REPEAT IN 2 HR WHEN >2 - Abnormal    Lactic Acid, Initial 0.4 (*)    ROUTINE UA WITH MICROSCOPIC REFLEX TO CULTURE - Abnormal    Color Urine Colorless      Appearance Urine Clear      Glucose Urine Negative      Bilirubin Urine Negative      Ketones Urine Negative      Specific Gravity Urine 1.004      Blood Urine Negative      pH Urine 6.5      Protein Albumin Urine 70 (*)     Urobilinogen Urine Normal      Nitrite Urine Negative      Leukocyte Esterase Urine Negative      Bacteria Urine Few (*)     RBC Urine 1      WBC Urine 3      Squamous Epithelials " Urine <1     D DIMER QUANTITATIVE - Abnormal    D-Dimer Quantitative 0.54 (*)    HEPATIC FUNCTION PANEL - Normal    Protein Total 6.4      Albumin 3.9      Bilirubin Total 0.3      Alkaline Phosphatase 80      AST 14      ALT 15      Bilirubin Direct <0.08     LIPASE - Normal    Lipase 27     PROCALCITONIN - Normal    Procalcitonin 0.06     TROPONIN T, HIGH SENSITIVITY - Normal    Troponin T, High Sensitivity 10     INFLUENZA A/B, RSV AND SARS-COV2 PCR - Normal    Influenza A PCR Negative      Influenza B PCR Negative      RSV PCR Negative      SARS CoV2 PCR Negative     TROPONIN T, HIGH SENSITIVITY - Normal    Troponin T, High Sensitivity 9     CBC WITH PLATELETS AND DIFFERENTIAL    WBC Count 6.5      RBC Count 3.87      Hemoglobin 12.3      Hematocrit 35.5      MCV 92      MCH 31.8      MCHC 34.6      RDW 13.1      Platelet Count 204      % Neutrophils 64      % Lymphocytes 27      % Monocytes 7      % Eosinophils 2      % Basophils 0      % Immature Granulocytes 0      NRBCs per 100 WBC 0      Absolute Neutrophils 4.1      Absolute Lymphocytes 1.8      Absolute Monocytes 0.4      Absolute Eosinophils 0.1      Absolute Basophils 0.0      Absolute Immature Granulocytes 0.0      Absolute NRBCs 0.0       ED INTERVENTIONS     Medications   acetaminophen (TYLENOL) tablet 650 mg (650 mg Oral Not Given 5/9/25 2037)   alum & mag hydroxide-simethicone (MAALOX) suspension 30 mL (30 mLs Oral $Given 5/9/25 2233)   famotidine (PEPCID) injection 20 mg (20 mg Intravenous $Given 5/9/25 2233)   iopamidol (ISOVUE-370) solution 60 mL (60 mLs Intravenous $Given 5/9/25 2305)   HYDROmorphone (PF) (DILAUDID) injection 0.5 mg (0.5 mg Intravenous $Given 5/10/25 0234)      TOTAL CRITICAL CARE TIME (EXCLUDING PROCEDURES): Not applicable    DISCHARGE MEDICATIONS        Review of your medicines        CONTINUE these medicines which have NOT CHANGED        Dose / Directions   acetaminophen 500 MG tablet  Commonly known as: TYLENOL      Dose:  500-1,000 mg  Take 500-1,000 mg by mouth every 6 hours as needed for mild pain.  Refills: 0     amLODIPine 10 MG tablet  Commonly known as: NORVASC      Dose: 0.5 tablet  Take 0.5 tablets by mouth 2 times daily.  Refills: 0     atorvastatin 20 MG tablet  Commonly known as: LIPITOR      Dose: 20 mg  Take 20 mg by mouth daily.  Refills: 0     B Complex Caps      Dose: 1 capsule  Take 1 capsule by mouth daily.  Refills: 0     Calcium 600+D Plus Minerals 600-400 MG-UNIT Tabs      Dose: 1 tablet  Take 1 tablet by mouth daily. Take 1 Tablet by mouth two times a day. as a calcium and vitamin D supplement  Refills: 0     cinnamon 500 MG Caps      Dose: 1 capsule  Take 1 capsule by mouth daily  Refills: 0     insulin glargine 100 UNIT/ML pen      Dose: 6 Units  Inject 6 Units subcutaneously every morning.  Refills: 0     lisinopril 10 MG tablet  Commonly known as: ZESTRIL  Used for: Essential hypertension      Dose: 10 mg  Take 1 tablet (10 mg) by mouth daily.  Quantity: 30 tablet  Refills: 0     multivitamin, therapeutic Tabs tablet      Dose: 1 tablet  Take 1 tablet by mouth daily  Refills: 0     nicotine 14 MG/24HR 24 hr patch  Commonly known as: NICODERM CQ      Dose: 1 patch  Place 1 patch onto the skin every 24 hours.  Refills: 0     nicotine 2 MG lozenge  Commonly known as: COMMIT      Dose: 2 mg  Place 2 mg inside cheek as needed for nicotine withdrawal symptoms. Slowly dissolve 1 lozenge every 1 -2 hrs (at least 9 daily) for 6 wks then every 2-4 hrs for 3 wks then 2-8 hrs for 3 wks  Refills: 0     NovoLOG FLEXPEN 100 UNIT/ML soln  Generic drug: insulin aspart      Dose: 3 Units  Inject 3 Units subcutaneously 3 times daily (with meals). Inject 4 Units subcutaneously three times daily before meals.  Refills: 0     PROBIOTIC PRODUCT PO      Dose: 1 tablet  Take 1 tablet by mouth daily.  Refills: 0     * psyllium capsule  Commonly known as: METAMUCIL/KONSYL      Dose: 2 capsule  Take 2 capsules by mouth every  morning.  Refills: 0     * psyllium capsule  Commonly known as: METAMUCIL/KONSYL      Dose: 3 capsule  Take 3 capsules by mouth every evening.  Refills: 0           * This list has 2 medication(s) that are the same as other medications prescribed for you. Read the directions carefully, and ask your doctor or other care provider to review them with you.                   Where to get your medicines        These medications were sent to [facility]/pharmacy #[id number] [place], [place]  [address] [place] [address]  [address] [facility] [address]      Phone: [phone]   lisinopril 10 MG tablet        DISPOSITION: Admit to cardiac telemetry observation/[facility] [place]      At the conclusion of the encounter I discussed the results of all of the tests and the disposition. The questions were answered. The patient or family acknowledged understanding and was agreeable with the care plan.    HISTORY OF PRESENT ILLNESS   Ana Bunn is a 64 year old year old female with a relevant past history of HTN, chronic kidney disease, and DMT2 who presents to the ED  for evaluation of chest pain and headache.    Patient has been experiencing chest pain and headache since 10am this morning. Patient also notes feeling dizzy, faint, weak, and experiencing jitters and a stiff neck. Patient has had some nausea but no vomiting or diaphoresis. The patient saw her PCP earlier today and went home after, though her PCP recommended being seen in the ED for further evaluation. The patient notes that her symptoms slightly improved in the afternoon but worsened after taking a nap. Patient has a history of UTI that developed to sepsis. Hx asymptomatic UTIs in the past. The patient has no history of headaches and denies fever, cough, sore throat, shortness of breath, and abdominal pain. Patient notes she just started taking amlodipine earlier this week per her nephrologist. She associates her symptoms with amlodipine. There were no other concerns/complaints at  this time.       INFORMATION SOURCE AND LIMITATIONS    History/Exam limitations: N/A  Patient information was obtained from: the patient and her daughter  Use of : N/A        REVIEW OF SYSTEMS:   All other systems reviewed and are negative except as noted above in HPI.    PATIENT HISTORY   No past medical history on file.  Patient Active Problem List   Diagnosis    Age-related nuclear cataract of both eyes    Chest pain    Anemia    Closed fracture of lower end of left radius with routine healing    E. coli UTI    High triglycerides    HTN (hypertension)    Microalbuminuria    Stage 3b chronic kidney disease (H)    Type 2 diabetes mellitus with microalbuminuria, with long-term current use of insulin (H)    Vitamin D insufficiency    Dizziness    Dyslipidemia    Esophageal reflux    Gait abnormality    History of abnormal cervical Pap smear    Imbalance    Mild traumatic brain injury (H)    Neck pain    Osteopenia    Right hip pain    Septic arthritis of lumbar spine    Chest pain, unspecified type     Past Surgical History:   Procedure Laterality Date    IR DISC ASPIRATION INJECTION  10/22/2023       No Known Allergies    OUTPATIENT MEDICATIONS     Discharge Medication List as of 5/10/2025  4:04 PM         Vitals:    05/10/25 0453 05/10/25 0725 05/10/25 1123 05/10/25 1500   BP: 122/65 124/69 (!) 154/80 (!) 143/67   BP Location: Left arm Left arm Left arm Left arm   Patient Position:       Cuff Size:       Pulse: 91 92 80    Resp: 16 18 18 17   Temp: 98.7  F (37.1  C) 98.6  F (37  C) 98.4  F (36.9  C) 97.7  F (36.5  C)   TempSrc: Oral Oral Oral Oral   SpO2: 98% 99% 99% 98%   Weight:       Height:          Physical Exam   Constitutional: Oriented to person, place, and time. Appears well-developed and well-nourished.   Cardiovascular: Normal rate, regular rhythm and normal heart sounds.    Pulmonary/Chest: Normal effort  and breath sounds normal.   Abdominal: Soft. Bowel sounds are normal.   Musculoskeletal:  Normal range of motion.   Neurological: Alert and oriented to person, place, and time. Normal strength. No sensory deficit.   Skin: Skin is warm and dry.   Psychiatric: Normal mood and affect. Behavior is normal. Thought content normal.     DIAGNOSTICS    LABORATORY FINDINGS (REVIEWED AND INTERPRETED):  Labs Ordered and Resulted from Time of ED Arrival to Time of ED Departure   BASIC METABOLIC PANEL - Abnormal       Result Value    Sodium 132 (*)     Potassium 4.1      Chloride 99      Carbon Dioxide (CO2) 23      Anion Gap 10      Urea Nitrogen 22.3      Creatinine 1.79 (*)     GFR Estimate 31 (*)     Calcium 9.2      Glucose 139 (*)    LACTIC ACID WHOLE BLOOD WITH 1X REPEAT IN 2 HR WHEN >2 - Abnormal    Lactic Acid, Initial 0.4 (*)    ROUTINE UA WITH MICROSCOPIC REFLEX TO CULTURE - Abnormal    Color Urine Colorless      Appearance Urine Clear      Glucose Urine Negative      Bilirubin Urine Negative      Ketones Urine Negative      Specific Gravity Urine 1.004      Blood Urine Negative      pH Urine 6.5      Protein Albumin Urine 70 (*)     Urobilinogen Urine Normal      Nitrite Urine Negative      Leukocyte Esterase Urine Negative      Bacteria Urine Few (*)     RBC Urine 1      WBC Urine 3      Squamous Epithelials Urine <1     D DIMER QUANTITATIVE - Abnormal    D-Dimer Quantitative 0.54 (*)    HEPATIC FUNCTION PANEL - Normal    Protein Total 6.4      Albumin 3.9      Bilirubin Total 0.3      Alkaline Phosphatase 80      AST 14      ALT 15      Bilirubin Direct <0.08     LIPASE - Normal    Lipase 27     PROCALCITONIN - Normal    Procalcitonin 0.06     TROPONIN T, HIGH SENSITIVITY - Normal    Troponin T, High Sensitivity 10     INFLUENZA A/B, RSV AND SARS-COV2 PCR - Normal    Influenza A PCR Negative      Influenza B PCR Negative      RSV PCR Negative      SARS CoV2 PCR Negative     TROPONIN T, HIGH SENSITIVITY - Normal    Troponin T, High Sensitivity 9     CBC WITH PLATELETS AND DIFFERENTIAL    WBC Count 6.5       RBC Count 3.87      Hemoglobin 12.3      Hematocrit 35.5      MCV 92      MCH 31.8      MCHC 34.6      RDW 13.1      Platelet Count 204      % Neutrophils 64      % Lymphocytes 27      % Monocytes 7      % Eosinophils 2      % Basophils 0      % Immature Granulocytes 0      NRBCs per 100 WBC 0      Absolute Neutrophils 4.1      Absolute Lymphocytes 1.8      Absolute Monocytes 0.4      Absolute Eosinophils 0.1      Absolute Basophils 0.0      Absolute Immature Granulocytes 0.0      Absolute NRBCs 0.0           IMAGING (REVIEWED AND INTERPRETED):  Echocardiogram Complete   Final Result      US Renal Complete Non-Vascular   Final Result   IMPRESSION:   1.  1.0 cm benign cyst of the right upper pole requiring no dedicated follow-up.   2.  No suspicious renal lesion or hydronephrosis.      CT Chest Pulmonary Embolism w Contrast   Final Result   IMPRESSION:   1.  No pulmonary artery embolus or other acute process within the chest.      XR Chest Port 1 View   Final Result   IMPRESSION: Negative chest.            ECG (REVIEWED AND INTERPRETED):   ECG:   Performed at: 09-MAY-2025 18:29  HR:  88 bpm  Rhythm: Sinus  Axis: -50  QRS duration: 96 ms  QTC: 447 ms  ST changes: No ST segment elevation or depression, no T wave inversion,No Q wave  Interpretation: Sinus rhythm, incomplete right bundle branch block  Compared to most recent ECG from: 12/14/2023 incomplete right bundle branch block not present    I have reviewed the patient's ECG, with comments made as listed above. Please see scanned image for full interpretation.     Billing Documentation    I obtained additional history from these independent historians: the patient's daughter    I reviewed these outside records:  5/9/2025 - [facility] - follow up for diabetes and blood pressure concerns    I noted these abnormal vital signs / labs:  NA    Monitor Strip Interpretation:  NA  12-Lead ECG Interpretation:  Sinus rhythm  I independently reviewed the  following diagnostic studies:  Chest x-ray  I spoke to the following clinicians regard the patients care:  Dr. Rangel, hospitalist    My disposition decision is based on the following reasons:  Admit:    Compliance Documentation  MIPS Documentation Medical Decision Making  I obtained history from Family Member/Significant Other  I reviewed the EMR: Outpatient Record: See Above  I independently interpreted the CXR and note no acute process. See radiology report for final interpretation.  I discussed the care with another health care provider: Hospitalist  Admit.    MIPS (CTPE, Dental pain, Rg, Sinusitis, Asthma/COPD, Head Trauma): CT Pulmonary Angiogram:Patient is moderate to high risk for PE.    SEPSIS: None        At the time of my evaluation, I do not feel the patient s symptoms are caused by sepsis    I, Aron Mohamud, am serving as a scribe to document services personally performed by Dr. Ho, based on my observations and the provider's statements to me.  I, Dr. Ho, attest that Aron Mohamud is acting in a scribe capacity, has observed my performance of the services and has documented them in accordance with my direction.        Jay Ho D.O.  EMERGENCY MEDICINE   05/09/25  [facility] EMERGENCY DEPARTMENT  [address] [place] [id number]  [phone]  Dept: [phone]     Jay Ho DO  05/12/25 0028

## 2025-08-03 ENCOUNTER — HOSPITAL ENCOUNTER (EMERGENCY)
Facility: HOSPITAL | Age: 65
Discharge: LEFT BEFORE DISCHARGE | End: 2025-08-03
Admitting: EMERGENCY MEDICINE
Payer: COMMERCIAL

## 2025-08-03 VITALS
TEMPERATURE: 98.2 F | OXYGEN SATURATION: 99 % | BODY MASS INDEX: 29.08 KG/M2 | DIASTOLIC BLOOD PRESSURE: 86 MMHG | HEIGHT: 62 IN | WEIGHT: 158 LBS | SYSTOLIC BLOOD PRESSURE: 166 MMHG | RESPIRATION RATE: 16 BRPM | HEART RATE: 114 BPM

## 2025-08-03 PROCEDURE — 99281 EMR DPT VST MAYX REQ PHY/QHP: CPT

## 2025-08-03 RX ORDER — ONDANSETRON 2 MG/ML
4 INJECTION INTRAMUSCULAR; INTRAVENOUS ONCE
Status: DISCONTINUED | OUTPATIENT
Start: 2025-08-03 | End: 2025-08-03 | Stop reason: HOSPADM

## 2025-08-03 ASSESSMENT — COLUMBIA-SUICIDE SEVERITY RATING SCALE - C-SSRS
1. IN THE PAST MONTH, HAVE YOU WISHED YOU WERE DEAD OR WISHED YOU COULD GO TO SLEEP AND NOT WAKE UP?: NO
6. HAVE YOU EVER DONE ANYTHING, STARTED TO DO ANYTHING, OR PREPARED TO DO ANYTHING TO END YOUR LIFE?: NO
2. HAVE YOU ACTUALLY HAD ANY THOUGHTS OF KILLING YOURSELF IN THE PAST MONTH?: NO

## (undated) RX ORDER — FENTANYL CITRATE 50 UG/ML
INJECTION, SOLUTION INTRAMUSCULAR; INTRAVENOUS
Status: DISPENSED
Start: 2023-10-22

## (undated) RX ORDER — LIDOCAINE HYDROCHLORIDE 10 MG/ML
INJECTION, SOLUTION EPIDURAL; INFILTRATION; INTRACAUDAL; PERINEURAL
Status: DISPENSED
Start: 2023-10-22